# Patient Record
Sex: FEMALE | Race: WHITE | NOT HISPANIC OR LATINO | Employment: OTHER | URBAN - METROPOLITAN AREA
[De-identification: names, ages, dates, MRNs, and addresses within clinical notes are randomized per-mention and may not be internally consistent; named-entity substitution may affect disease eponyms.]

---

## 2017-02-16 ENCOUNTER — OFFICE VISIT (OUTPATIENT)
Dept: MEDICAL GROUP | Age: 72
End: 2017-02-16
Payer: MEDICARE

## 2017-02-16 VITALS
BODY MASS INDEX: 29.02 KG/M2 | SYSTOLIC BLOOD PRESSURE: 142 MMHG | RESPIRATION RATE: 14 BRPM | HEIGHT: 64 IN | TEMPERATURE: 98.5 F | DIASTOLIC BLOOD PRESSURE: 76 MMHG | HEART RATE: 73 BPM | WEIGHT: 170 LBS | OXYGEN SATURATION: 97 %

## 2017-02-16 DIAGNOSIS — H61.22 IMPACTED CERUMEN OF LEFT EAR: ICD-10-CM

## 2017-02-16 PROCEDURE — 99999 PR NO CHARGE: CPT | Performed by: PHYSICIAN ASSISTANT

## 2017-02-16 PROCEDURE — 99999 PR REMOVE IMPACTED EAR WAX: CPT | Performed by: PHYSICIAN ASSISTANT

## 2017-02-16 ASSESSMENT — PAIN SCALES - GENERAL: PAINLEVEL: NO PAIN

## 2017-02-16 ASSESSMENT — PATIENT HEALTH QUESTIONNAIRE - PHQ9: CLINICAL INTERPRETATION OF PHQ2 SCORE: 0

## 2017-02-16 NOTE — MR AVS SNAPSHOT
"        Yoselin Jorge   2017 3:40 PM   Office Visit   MRN: [id number]    Department:  [address] [place] [address]   Dept Phone:  [phone]    Description:  Female : 1945   Provider:  Shane Brownlee PA-C           Reason for Visit     Ear Fullness wax      Allergies as of 2017     No Known Allergies      You were diagnosed with     Impacted cerumen of left ear   [473081]         Vital Signs     Blood Pressure Pulse Temperature Respirations Height Weight    142/76 mmHg 73 36.9 °C (98.5 °F) 14 1.613 m (5' 3.5\") 77.111 kg (170 lb)    Body Mass Index Oxygen Saturation Last Menstrual Period Smoking Status          29.64 kg/m2 97% 1995 Never Smoker         Basic Information     Date Of Birth Sex Race Ethnicity Preferred Language    1945 Female White Non- English      Your appointments     2017  1:00 PM   Non Provider 1 with [place] [id number]  [facility] [id number] [phone]           You will be receiving a confirmation call a few days before your appointment from our automated call confirmation system.            Oct 24, 2017 10:20 AM   Established Patient with Britt Benjamin M.D.   [facility] [place] [address]  [facility] [address] [phone]           You will be receiving a confirmation call a few days before your appointment from our automated call confirmation system.              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   3/11/2013 - Present    Family history of melanoma Z80.8   3/11/2013 - Present    Other lymphedema I89.0   3/11/2013 - Present    Preventative health care Z00.00   3/17/2013 - Present    Impaired fasting glucose R73.01   3/17/2013 - Present    SENSORINEURAL HEARING LOSS    3/17/2013 - Present   " Osteopenia M85.80   3/30/2013 - Present    Swallowing difficulty R13.10   1/13/2015 - Present    Hypothyroidism (acquired) E03.9   1/13/2015 - Present    S/P dilatation of esophageal stricture Z98.890, Z87.19   5/16/2015 - Present    S/P thyroidectomy E89.0   5/22/2015 - Present    Impacted cerumen of left ear H61.22   2/16/2017 - Present      Health Maintenance        Date Due Completion Dates    MAMMOGRAM 4/19/2017 4/19/2016, 4/28/2015, 3/31/2014, 3/24/2014, 3/24/2014, 12/3/2012 (Done)    Override on 12/3/2012: Done    BONE DENSITY 4/28/2017 4/28/2015, 3/27/2013    IMM DTaP/Tdap/Td Vaccine (2 - Td) 10/27/2019 10/27/2009    COLONOSCOPY 1/5/2026 1/5/2016            Current Immunizations     13-VALENT PCV PREVNAR 10/16/2014    Influenza TIV (IM) 10/20/2013    Influenza Vaccine Adult HD 10/25/2016 11:06 AM, 10/12/2015    Influenza Vaccine Quad Inj (Pf) 10/9/2014    Pneumococcal polysaccharide vaccine (PPSV-23) 10/13/2011    SHINGLES VACCINE 10/27/2009    Tdap Vaccine 10/27/2009      Below and/or attached are the medications your provider expects you to take. Review all of your home medications and newly ordered medications with your provider and/or pharmacist. Follow medication instructions as directed by your provider and/or pharmacist. Please keep your medication list with you and share with your provider. Update the information when medications are discontinued, doses are changed, or new medications (including over-the-counter products) are added; and carry medication information at all times in the event of emergency situations     Allergies:  No Known Allergies          Medications  Valid as of: February 16, 2017 -  4:18 PM    Generic Name Brand Name Tablet Size Instructions for use    Aspirin (Tablet Delayed Response) ECOTRIN 81 MG Take 81 mg by mouth every day. 1 tab 3x a week        Cholecalciferol (Tab) cholecalciferol 1000 UNIT Take 1,000 Units by mouth every day.        Famotidine-Ca Carb-Mag Hydrox   Take   by mouth.        Levothyroxine Sodium (Tab) SYNTHROID 112 MCG Take 1 Tab by mouth every day.        Multiple Vitamins-Minerals   Take  by mouth.        Naproxen Sodium (Tab) ANAPROX 220 MG         Probiotic Product   Take  by mouth.        Simvastatin (Tab) ZOCOR 20 MG TAKE 1 TABLET BY MOUTH ATBEDTIME        Zoledronic Acid (Solution) RECLAST 5 MG/100ML 5 mg by Intravenous route Once.        .                 Medicines prescribed today were sent to:     [facility], [place] - [address]    [address] [facility] [place] [address]    Phone: [phone] Fax: [phone]    Open 24 Hours?: No      Medication refill instructions:       If your prescription bottle indicates you have medication refills left, it is not necessary to call your provider’s office. Please contact your pharmacy and they will refill your medication.    If your prescription bottle indicates you do not have any refills left, you may request refills at any time through one of the following ways: The online [employer] system (except Urgent Care), by calling your provider’s office, or by asking your pharmacy to contact your provider’s office with a refill request. Medication refills are processed only during regular business hours and may not be available until the next business day. Your provider may request additional information or to have a follow-up visit with you prior to refilling your medication.   *Please Note: Medication refills are assigned a new Rx number when refilled electronically. Your pharmacy may indicate that no refills were authorized even though a new prescription for the same medication is available at the pharmacy. Please request the medicine by name with the pharmacy before contacting your provider for a refill.           [employer] Access Code: Activation code not generated  Current [employer] Status: Active

## 2017-02-17 NOTE — ASSESSMENT & PLAN NOTE
This is a 71-year-old female with a history of ear wax impaction on the left side. Was over a year ago she had a lavage. Today she comes in with complaints of decreased hearing. Denies any pain. Has not used any over-the-counter remedies. Did put some oil in there the other day. She wears hearing aids.

## 2017-02-17 NOTE — PROGRESS NOTES
Subjective:   Yoselin Jorge is a 71 y.o. female here today for ear wax impaction of left side with possible lavage needed.    Impacted cerumen of left ear  This is a 71-year-old female with a history of ear wax impaction on the left side. Was over a year ago she had a lavage. Today she comes in with complaints of decreased hearing. Denies any pain. Has not used any over-the-counter remedies. Did put some oil in there the other day. She wears hearing aids.         Current medicines (including changes today)  Current Outpatient Prescriptions   Medication Sig Dispense Refill   • simvastatin (ZOCOR) 20 MG Tab TAKE 1 TABLET BY MOUTH ATBEDTIME 90 Tab 0   • zoledronic Acid (RECLAST) 5 MG/100ML Solution IVPB premix 5 mg by Intravenous route Once.     • levothyroxine (SYNTHROID) 112 MCG Tab Take 1 Tab by mouth every day. 90 Tab 1   • Probiotic Product (PROBIOTIC DAILY PO) Take  by mouth.     • Famotidine-Ca Carb-Mag Hydrox (ACID REDUCER + ANTACID PO) Take  by mouth.     • aspirin EC (ECOTRIN) 81 MG TBEC Take 81 mg by mouth every day. 1 tab 3x a week 30 Tab    • naproxen (ALEVE) 220 MG tablet  60 Tab    • vitamin D (CHOLECALCIFEROL) 1000 UNIT TABS Take 1,000 Units by mouth every day. 30 Tab 11   • Multiple Vitamins-Minerals (WOMENS BONE HEALTH PO) Take  by mouth.       No current facility-administered medications for this visit.     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    ROS   No chest pain, no shortness of breath, no abdominal pain and all other systems were reviewed and are  "negative.       Objective:     Blood pressure 142/76, pulse 73, temperature 36.9 °C (98.5 °F), resp. rate 14, height 1.613 m (5' 3.5\"), weight 77.111 kg (170 lb), last menstrual period 08/01/1995, SpO2 97 %. Body mass index is 29.64 kg/(m^2). *    Physical Exam:  Constitutional: Alert, no distress.  Skin: Warm, dry, good turgor, no rashes in visible areas.  Eye: Equal, round and reactive, conjunctiva clear, lids normal.  ENMT: Lips without lesions, good dentition, oropharynx clear, right TM is clear, left TM with cerumen impaction.  Neck: Trachea midline, no masses.   Lymph: No cervical or supraclavicular lymphadenopathy  Respiratory: Unlabored respiratory effort, lungs appear clear.  Cardiovascular: Regular rate and rhythm.   Psych: Alert and oriented x3, normal affect and mood.    Multiple attempts using warm water with hydrogen peroxide mix with the health and ears as well as a syringe were unsuccessful. Patient tolerated procedure well. Afterwards there was still a hard rock of wax in the ear.        Assessment and Plan:   The following treatment plan was discussed    1. Impacted cerumen of left ear  Acute condition. Unfortunately no Debrox to clinic. Unsuccessful attempt to remove cerumen impaction. Advised her to purchase over-the-counter Debrox or a substitute use as directed and follow-up on Tuesday of next week. She will make an appointment with Ada.  - PA REMOVE IMPACTED EAR WAX      Followup: Return in about 3 days (around 2/19/2017), or if symptoms worsen or fail to improve.    Please note that this dictation was created using voice recognition software. I have made every reasonable attempt to correct obvious errors, but I expect that there are errors of grammar and possibly content that I did not discover before finalizing the note.             "

## 2017-02-21 ENCOUNTER — NON-PROVIDER VISIT (OUTPATIENT)
Dept: MEDICAL GROUP | Facility: MEDICAL CENTER | Age: 72
End: 2017-02-21
Payer: MEDICARE

## 2017-02-21 DIAGNOSIS — H61.22 IMPACTED CERUMEN OF LEFT EAR: ICD-10-CM

## 2017-02-21 PROCEDURE — 69210 REMOVE IMPACTED EAR WAX UNI: CPT | Performed by: PHYSICIAN ASSISTANT

## 2017-02-21 NOTE — PROGRESS NOTES
Yoselin Pelaez Luisito is a 71 y.o. female here for a non-provider visit for ear lavage for left ear.    If abnormal was an in office provider notified today (if so, indicate provider)? No  Routed to PCP? Yes

## 2017-02-21 NOTE — MR AVS SNAPSHOT
EvelynMeka Pelaez Luisito   2017 1:00 PM   Non-Provider Visit   MRN: [id number]    Department:  [facility]   Dept Phone:  [phone]    Description:  Female : 1945   Provider:  [facility] MA           Reason for Visit     Cerumen Impaction left ear lavage      Allergies as of 2017     No Known Allergies      You were diagnosed with     Impacted cerumen of left ear   [[id number]]         Vital Signs     Last Menstrual Period Smoking Status                1995 Never Smoker           Basic Information     Date Of Birth Sex Race Ethnicity Preferred Language    1945 Female White Non- English      Your appointments     May 30, 2017  1:15 PM   Non Provider 1 with [facility] [address] [facility] [address] [facility] ([facility])    [address]  [facility] [address] [phone]           You will be receiving a confirmation call a few days before your appointment from our automated call confirmation system.            Oct 24, 2017 10:20 AM   Established Patient with Britt Benjamin M.D.   [facility]ology ([facility])    [address]  [facility] [address] [phone]           You will be receiving a confirmation call a few days before your appointment from our automated call confirmation system.              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   3/11/2013 - Present    Family history of melanoma Z80.8   3/11/2013 - Present    Other lymphedema I89.0   3/11/2013 - Present    Preventative health care Z00.00   3/17/2013 - Present    Impaired fasting glucose R73.01   3/17/2013 - Present    SENSORINEURAL HEARING LOSS    3/17/2013 - Present    Osteopenia M85.80   3/30/2013 - Present    Swallowing difficulty R13.10   2015 - Present    Hypothyroidism (acquired) E03.9   2015 - Present    S/P dilatation of  esophageal stricture Z98.890, Z87.19   5/16/2015 - Present    S/P thyroidectomy E89.0   5/22/2015 - Present    Impacted cerumen of left ear H61.22   2/16/2017 - Present      Health Maintenance        Date Due Completion Dates    MAMMOGRAM 4/19/2017 4/19/2016, 4/28/2015, 3/31/2014, 3/24/2014, 3/24/2014, 12/3/2012 (Done)    Override on 12/3/2012: Done    BONE DENSITY 4/28/2017 4/28/2015, 3/27/2013    IMM DTaP/Tdap/Td Vaccine (2 - Td) 10/27/2019 10/27/2009    COLONOSCOPY 1/5/2026 1/5/2016            Current Immunizations     13-VALENT PCV PREVNAR 10/16/2014    Influenza TIV (IM) 10/20/2013    Influenza Vaccine Adult HD 10/25/2016 11:06 AM, 10/12/2015    Influenza Vaccine Quad Inj (Pf) 10/9/2014    Pneumococcal polysaccharide vaccine (PPSV-23) 10/13/2011    SHINGLES VACCINE 10/27/2009    Tdap Vaccine 10/27/2009      Below and/or attached are the medications your provider expects you to take. Review all of your home medications and newly ordered medications with your provider and/or pharmacist. Follow medication instructions as directed by your provider and/or pharmacist. Please keep your medication list with you and share with your provider. Update the information when medications are discontinued, doses are changed, or new medications (including over-the-counter products) are added; and carry medication information at all times in the event of emergency situations     Allergies:  No Known Allergies          Medications  Valid as of: February 21, 2017 -  1:31 PM    Generic Name Brand Name Tablet Size Instructions for use    Aspirin (Tablet Delayed Response) ECOTRIN 81 MG Take 81 mg by mouth every day. 1 tab 3x a week        Cholecalciferol (Tab) cholecalciferol 1000 UNIT Take 1,000 Units by mouth every day.        Famotidine-Ca Carb-Mag Hydrox   Take  by mouth.        Levothyroxine Sodium (Tab) SYNTHROID 112 MCG Take 1 Tab by mouth every day.        Multiple Vitamins-Minerals   Take  by mouth.        Naproxen Sodium  (Tab) ANAPROX 220 MG         Probiotic Product   Take  by mouth.        Simvastatin (Tab) ZOCOR 20 MG TAKE 1 TABLET BY MOUTH ATBEDTIME        Zoledronic Acid (Solution) RECLAST 5 MG/100ML 5 mg by Intravenous route Once.        .                 Medicines prescribed today were sent to:     [facility] [address]  [place], [place] - [address]    [address] [facility] [place] [address]    Phone: [phone] Fax: [phone]    Open 24 Hours?: No      Medication refill instructions:       If your prescription bottle indicates you have medication refills left, it is not necessary to call your provider’s office. Please contact your pharmacy and they will refill your medication.    If your prescription bottle indicates you do not have any refills left, you may request refills at any time through one of the following ways: The online [employer] system (except Urgent Care), by calling your provider’s office, or by asking your pharmacy to contact your provider’s office with a refill request. Medication refills are processed only during regular business hours and may not be available until the next business day. Your provider may request additional information or to have a follow-up visit with you prior to refilling your medication.   *Please Note: Medication refills are assigned a new Rx number when refilled electronically. Your pharmacy may indicate that no refills were authorized even though a new prescription for the same medication is available at the pharmacy. Please request the medicine by name with the pharmacy before contacting your provider for a refill.           [employer] Access Code: Activation code not generated  Current [employer] Status: Active

## 2017-03-21 RX ORDER — LEVOTHYROXINE SODIUM 112 UG/1
TABLET ORAL
Qty: 90 TAB | Refills: 1 | Status: SHIPPED | OUTPATIENT
Start: 2017-03-21 | End: 2017-09-27 | Stop reason: SDUPTHER

## 2017-03-21 RX ORDER — SIMVASTATIN 20 MG
TABLET ORAL
Qty: 30 TAB | Refills: 0 | Status: SHIPPED | OUTPATIENT
Start: 2017-03-21 | End: 2017-05-23 | Stop reason: SDUPTHER

## 2017-03-21 NOTE — TELEPHONE ENCOUNTER
Was the patient seen in the last year in this department? Yes 4/25/2016    Does patient have an active prescription for medications requested? Yes     Received Request Via: Pharmacy

## 2017-04-17 ENCOUNTER — OFFICE VISIT (OUTPATIENT)
Dept: URGENT CARE | Facility: CLINIC | Age: 72
End: 2017-04-17
Payer: MEDICARE

## 2017-04-17 VITALS
WEIGHT: 164 LBS | DIASTOLIC BLOOD PRESSURE: 68 MMHG | RESPIRATION RATE: 18 BRPM | TEMPERATURE: 98.2 F | OXYGEN SATURATION: 97 % | HEART RATE: 66 BPM | HEIGHT: 63 IN | BODY MASS INDEX: 29.06 KG/M2 | SYSTOLIC BLOOD PRESSURE: 124 MMHG

## 2017-04-17 DIAGNOSIS — M54.2 NECK PAIN: ICD-10-CM

## 2017-04-17 DIAGNOSIS — Z85.819 HISTORY OF OROPHARYNGEAL CANCER: ICD-10-CM

## 2017-04-17 LAB
INT CON NEG: NEGATIVE
INT CON POS: POSITIVE
S PYO AG THROAT QL: NORMAL

## 2017-04-17 PROCEDURE — 4040F PNEUMOC VAC/ADMIN/RCVD: CPT | Performed by: NURSE PRACTITIONER

## 2017-04-17 PROCEDURE — 1101F PT FALLS ASSESS-DOCD LE1/YR: CPT | Performed by: NURSE PRACTITIONER

## 2017-04-17 PROCEDURE — G8432 DEP SCR NOT DOC, RNG: HCPCS | Performed by: NURSE PRACTITIONER

## 2017-04-17 PROCEDURE — 87880 STREP A ASSAY W/OPTIC: CPT | Performed by: NURSE PRACTITIONER

## 2017-04-17 PROCEDURE — G8419 CALC BMI OUT NRM PARAM NOF/U: HCPCS | Performed by: NURSE PRACTITIONER

## 2017-04-17 PROCEDURE — 99214 OFFICE O/P EST MOD 30 MIN: CPT | Performed by: NURSE PRACTITIONER

## 2017-04-17 PROCEDURE — 1036F TOBACCO NON-USER: CPT | Performed by: NURSE PRACTITIONER

## 2017-04-17 PROCEDURE — 3014F SCREEN MAMMO DOC REV: CPT | Performed by: NURSE PRACTITIONER

## 2017-04-17 NOTE — MR AVS SNAPSHOT
"        Yoselin Pelaez Jorge   2017 5:00 PM   Office Visit   MRN: [id number]    Department:  [facility]   Dept Phone:  [phone]    Description:  Female : 1945   Provider:  Cathey J Hamman, A.P.N.           Reason for Visit     Pharyngitis ear ache, swollen gland,       Allergies as of 2017     No Known Allergies      You were diagnosed with     Neck pain   [280847]       History of oropharyngeal cancer   [859423]         Vital Signs     Blood Pressure Pulse Temperature Respirations Height Weight    124/68 mmHg 66 36.8 °C (98.2 °F) 18 1.6 m (5' 3\") 74.39 kg (164 lb)    Body Mass Index Oxygen Saturation Last Menstrual Period Smoking Status          29.06 kg/m2 97% 1995 Never Smoker         Basic Information     Date Of Birth Sex Race Ethnicity Preferred Language    1945 Female White Non- English      Your appointments     2017  9:30 AM   Adult Draw/Collection with LAB [facility]   LAB - [facility] (--)    [address]  [place] [address]   [phone]            May 11, 2017 10:30 AM   MA SCRN10 with S BENIGNO MG 1   [facility]MOGRAP[facility] ([place])    [address]  [place] [address] [phone]           No deodorant, powder, perfume or lotion under the arm or breast area.            May 11, 2017  1:00 PM   Access New To You with Joselin Caro M.D.   [facility] (--)    [address]  [place] [address] [phone]            May 30, 2017  1:15 PM   Non Provider 1 with [facility] [address]   [facility] ([facility])    [address]  [place] [id number] [phone]           You will be receiving a confirmation call a few days before your appointment from our automated call confirmation system.            Oct 24, 2017 10:20 AM   Established Patient with Britt Benjamin M.D.   [facility] End[facility] ([facility]    [address]  [facility] [address] [phone]           You will be receiving a confirmation call a few days before your appointment from our automated call confirmation system.              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   3/11/2013 - Present    Family history of melanoma Z80.8   3/11/2013 - Present    Other lymphedema I89.0   3/11/2013 - Present    Preventative health care Z00.00   3/17/2013 - Present    Impaired fasting glucose R73.01   3/17/2013 - Present    SENSORINEURAL HEARING LOSS    3/17/2013 - Present    Osteopenia M85.80   3/30/2013 - Present    Swallowing difficulty R13.10   1/13/2015 - Present    Hypothyroidism (acquired) E03.9   1/13/2015 - Present    S/P dilatation of esophageal stricture Z98.890, Z87.19   5/16/2015 - Present    S/P thyroidectomy E89.0   5/22/2015 - Present    Impacted cerumen of left ear H61.22   2/16/2017 - Present      Health Maintenance        Date Due Completion Dates    MAMMOGRAM 4/19/2017 4/19/2016, 4/28/2015, 3/31/2014, 3/24/2014, 3/24/2014, 12/3/2012 (Done)    Override on 12/3/2012: Done    BONE DENSITY 4/28/2017 4/28/2015, 3/27/2013    IMM DTaP/Tdap/Td Vaccine (2 - Td) 10/27/2019 10/27/2009    COLONOSCOPY 1/5/2026 1/5/2016            Results     POCT Rapid Strep A      Component    Rapid Strep Screen    NEG    Internal Control Positive    Positive    Internal Control Negative    Negative                        Current Immunizations     13-VALENT PCV PREVNAR 10/16/2014    Influenza TIV (IM) 10/20/2013    Influenza Vaccine Adult HD 10/25/2016 11:06 AM, 10/12/2015    Influenza Vaccine Quad Inj (Pf) 10/9/2014    Pneumococcal polysaccharide vaccine (PPSV-23) 10/13/2011    SHINGLES VACCINE 10/27/2009    Tdap Vaccine 10/27/2009      Below and/or attached are the medications your provider expects you to take. Review all of your home medications  and newly ordered medications with your provider and/or pharmacist. Follow medication instructions as directed by your provider and/or pharmacist. Please keep your medication list with you and share with your provider. Update the information when medications are discontinued, doses are changed, or new medications (including over-the-counter products) are added; and carry medication information at all times in the event of emergency situations     Allergies:  No Known Allergies          Medications  Valid as of: April 17, 2017 -  6:07 PM    Generic Name Brand Name Tablet Size Instructions for use    Aspirin (Tablet Delayed Response) ECOTRIN 81 MG Take 81 mg by mouth every day. 1 tab 3x a week        Cholecalciferol (Tab) cholecalciferol 1000 UNIT Take 1,000 Units by mouth every day.        Famotidine-Ca Carb-Mag Hydrox   Take  by mouth.        Levothyroxine Sodium (Tab) SYNTHROID 112 MCG TAKE 1 TAB BY MOUTH EVERYDAY.        Multiple Vitamins-Minerals   Take  by mouth.        Naproxen Sodium (Tab) ANAPROX 220 MG         Probiotic Product   Take  by mouth.        Simvastatin (Tab) ZOCOR 20 MG TAKE 1 TABLET BY MOUTH ATBEDTIME        Zoledronic Acid (Solution) RECLAST 5 MG/100ML 5 mg by Intravenous route Once.        .                 Medicines prescribed today were sent to:     [facility] [address] [place] [address] [facility]    [address] [address]    Phone: [phone] Fax: [phone]    Open 24 Hours?: No      Medication refill instructions:       If your prescription bottle indicates you have medication refills left, it is not necessary to call your provider’s office. Please contact your pharmacy and they will refill your medication.    If your prescription bottle indicates you do not have any refills left, you may request refills at any time through one of the following ways: The online [employer] system (except Urgent Care), by calling your  provider’s office, or by asking your pharmacy to contact your provider’s office with a refill request. Medication refills are processed only during regular business hours and may not be available until the next business day. Your provider may request additional information or to have a follow-up visit with you prior to refilling your medication.   *Please Note: Medication refills are assigned a new Rx number when refilled electronically. Your pharmacy may indicate that no refills were authorized even though a new prescription for the same medication is available at the pharmacy. Please request the medicine by name with the pharmacy before contacting your provider for a refill.        Your To Do List     Future Labs/Procedures Complete By Expires    US-SOFT TISSUES OF HEAD - NECK  As directed 4/17/2018         [employer] Access Code: Activation code not generated  Current [employer] Status: Active

## 2017-04-19 ENCOUNTER — HOSPITAL ENCOUNTER (OUTPATIENT)
Dept: LAB | Facility: MEDICAL CENTER | Age: 72
End: 2017-04-19
Attending: INTERNAL MEDICINE
Payer: MEDICARE

## 2017-04-19 DIAGNOSIS — R73.01 IMPAIRED FASTING GLUCOSE: ICD-10-CM

## 2017-04-19 DIAGNOSIS — E89.0 POSTSURGICAL HYPOTHYROIDISM: ICD-10-CM

## 2017-04-19 DIAGNOSIS — E78.2 MIXED HYPERLIPIDEMIA: ICD-10-CM

## 2017-04-19 LAB
CREAT UR-MCNC: 137.4 MG/DL
EST. AVERAGE GLUCOSE BLD GHB EST-MCNC: 117 MG/DL
HBA1C MFR BLD: 5.7 % (ref 0–5.6)
MICROALBUMIN UR-MCNC: 1.4 MG/DL
MICROALBUMIN/CREAT UR: 10 MG/G (ref 0–30)
T4 FREE SERPL-MCNC: 1.07 NG/DL (ref 0.53–1.43)
TSH SERPL DL<=0.005 MIU/L-ACNC: 1.99 UIU/ML (ref 0.3–3.7)

## 2017-04-19 PROCEDURE — 80061 LIPID PANEL: CPT

## 2017-04-19 PROCEDURE — 80048 BASIC METABOLIC PNL TOTAL CA: CPT

## 2017-04-19 PROCEDURE — 83036 HEMOGLOBIN GLYCOSYLATED A1C: CPT

## 2017-04-19 PROCEDURE — 36415 COLL VENOUS BLD VENIPUNCTURE: CPT

## 2017-04-19 PROCEDURE — 82570 ASSAY OF URINE CREATININE: CPT

## 2017-04-19 PROCEDURE — 84443 ASSAY THYROID STIM HORMONE: CPT

## 2017-04-19 PROCEDURE — 82043 UR ALBUMIN QUANTITATIVE: CPT

## 2017-04-19 PROCEDURE — 84439 ASSAY OF FREE THYROXINE: CPT

## 2017-04-20 LAB
ANION GAP SERPL CALC-SCNC: 11 MMOL/L (ref 0–11.9)
BUN SERPL-MCNC: 20 MG/DL (ref 8–22)
CALCIUM SERPL-MCNC: 10.1 MG/DL (ref 8.5–10.5)
CHLORIDE SERPL-SCNC: 104 MMOL/L (ref 96–112)
CHOLEST SERPL-MCNC: 158 MG/DL (ref 100–199)
CO2 SERPL-SCNC: 25 MMOL/L (ref 20–33)
CREAT SERPL-MCNC: 0.7 MG/DL (ref 0.5–1.4)
GFR SERPL CREATININE-BSD FRML MDRD: >60 ML/MIN/1.73 M 2
GLUCOSE SERPL-MCNC: 95 MG/DL (ref 65–99)
HDLC SERPL-MCNC: 60 MG/DL
LDLC SERPL CALC-MCNC: 83 MG/DL
POTASSIUM SERPL-SCNC: 4.3 MMOL/L (ref 3.6–5.5)
SODIUM SERPL-SCNC: 140 MMOL/L (ref 135–145)
TRIGL SERPL-MCNC: 76 MG/DL (ref 0–149)

## 2017-04-28 ASSESSMENT — ENCOUNTER SYMPTOMS
EYES NEGATIVE: 1
MUSCULOSKELETAL NEGATIVE: 1
RESPIRATORY NEGATIVE: 1
SORE THROAT: 1
FEVER: 0
GASTROINTESTINAL NEGATIVE: 1
CHILLS: 0
CARDIOVASCULAR NEGATIVE: 1
SWOLLEN GLANDS: 1

## 2017-04-28 NOTE — PROGRESS NOTES
Subjective:      Yoselin Jorge is a 71 y.o. 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 IOL   • S/P dilatation of esophageal stricture 5/16/2015     EGD 2015.   • S/P thyroidectomy 5/22/2015     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    Moved from [place] to [place] 2011.      Family History   Problem Relation Age of Onset   • Cancer Brother      melanoma, liver cancer   • Cancer Brother      prostate cancer   • Diabetes Father    • Lung Disease Mother 68     Emphazema   • Cancer Brother 55     liver cancer   • Cancer Brother 55     Prostate cancer   • Heart Disease Maternal Grandmother 63       Allergies: Review of patient's allergies indicates no known allergies.    This patient is a 71-year-old female who presents today with complaint of ear fullness and slightly sore throat. Symptoms started over the last few days. Moreover, patient is concerned because she has had a history of oropharyngeal cancer and she noted some swelling in the lymph nodes and is concerned about this. She has not had any fever, aches, or  "chills.          Pharyngitis   This is a new problem. The current episode started in the past 7 days. The problem has been unchanged. There has been no fever. Associated symptoms include swollen glands. She has had no exposure to strep or mono. She has tried nothing for the symptoms. The treatment provided no relief.       Review of Systems   Constitutional: Positive for malaise/fatigue. Negative for fever and chills.   HENT: Positive for sore throat.    Eyes: Negative.    Respiratory: Negative.    Cardiovascular: Negative.    Gastrointestinal: Negative.    Genitourinary: Negative.    Musculoskeletal: Negative.    Skin: Negative.        All other systems reviewed and are within normal limits     Objective:     /68 mmHg  Pulse 66  Temp(Src) 36.8 °C (98.2 °F)  Resp 18  Ht 1.6 m (5' 3\")  Wt 74.39 kg (164 lb)  BMI 29.06 kg/m2  SpO2 97%  LMP 08/01/1995     Physical Exam   Constitutional: She is oriented to person, place, and time. She appears well-developed and well-nourished. No distress.   HENT:   Right Ear: External ear normal.   Left Ear: External ear normal.   Nose: Nose normal.   Oropharynx slightly red    Eyes: EOM are normal. Pupils are equal, round, and reactive to light.   Neck: Normal range of motion. Neck supple.   Mild right sided adenopathy   Cardiovascular: Normal rate and regular rhythm.    Pulmonary/Chest: Effort normal and breath sounds normal.   Musculoskeletal: Normal range of motion.   Lymphadenopathy:     She has cervical adenopathy.   Neurological: She is alert and oriented to person, place, and time.   Skin: Skin is warm and dry. She is not diaphoretic.   Psychiatric: She has a normal mood and affect.   Vitals reviewed.    Rapid strep: negative           Assessment/Plan:     1. Neck pain    - POCT Rapid Strep A  - US-SOFT TISSUES OF HEAD - NECK; Future    2. History of oropharyngeal cancer    - POCT Rapid Strep A  - US-SOFT TISSUES OF HEAD - NECK; Future; phylicia [facility] results  -Follow " up if symptoms persist or worsen

## 2017-05-04 ENCOUNTER — TELEPHONE (OUTPATIENT)
Dept: MEDICAL GROUP | Facility: LAB | Age: 72
End: 2017-05-04

## 2017-05-10 ENCOUNTER — HOSPITAL ENCOUNTER (OUTPATIENT)
Dept: RADIOLOGY | Facility: MEDICAL CENTER | Age: 72
End: 2017-05-10
Attending: NURSE PRACTITIONER
Payer: MEDICARE

## 2017-05-10 DIAGNOSIS — Z85.819 HISTORY OF OROPHARYNGEAL CANCER: ICD-10-CM

## 2017-05-10 DIAGNOSIS — M54.2 NECK PAIN: ICD-10-CM

## 2017-05-10 PROCEDURE — 76536 US EXAM OF HEAD AND NECK: CPT

## 2017-05-11 ENCOUNTER — HOSPITAL ENCOUNTER (OUTPATIENT)
Dept: RADIOLOGY | Facility: MEDICAL CENTER | Age: 72
End: 2017-05-11
Attending: FAMILY MEDICINE
Payer: MEDICARE

## 2017-05-11 ENCOUNTER — OFFICE VISIT (OUTPATIENT)
Dept: MEDICAL GROUP | Facility: LAB | Age: 72
End: 2017-05-11
Payer: MEDICARE

## 2017-05-11 VITALS
WEIGHT: 174 LBS | DIASTOLIC BLOOD PRESSURE: 60 MMHG | TEMPERATURE: 97.8 F | HEART RATE: 60 BPM | BODY MASS INDEX: 29.71 KG/M2 | SYSTOLIC BLOOD PRESSURE: 122 MMHG | HEIGHT: 64 IN | RESPIRATION RATE: 12 BRPM | OXYGEN SATURATION: 96 %

## 2017-05-11 DIAGNOSIS — E66.9 OBESITY (BMI 30-39.9): ICD-10-CM

## 2017-05-11 DIAGNOSIS — Z85.819 HISTORY OF NASOPHARYNGEAL CANCER: ICD-10-CM

## 2017-05-11 DIAGNOSIS — M54.2 NECK PAIN ON RIGHT SIDE: ICD-10-CM

## 2017-05-11 DIAGNOSIS — R59.0 LYMPHADENOPATHY OF RIGHT CERVICAL REGION: ICD-10-CM

## 2017-05-11 DIAGNOSIS — R73.09 ELEVATED HEMOGLOBIN A1C: ICD-10-CM

## 2017-05-11 DIAGNOSIS — E78.5 HYPERLIPIDEMIA, UNSPECIFIED HYPERLIPIDEMIA TYPE: ICD-10-CM

## 2017-05-11 DIAGNOSIS — Z12.31 ENCOUNTER FOR SCREENING MAMMOGRAM FOR BREAST CANCER: ICD-10-CM

## 2017-05-11 DIAGNOSIS — Z13.9 SCREENING: ICD-10-CM

## 2017-05-11 PROCEDURE — 1036F TOBACCO NON-USER: CPT | Performed by: FAMILY MEDICINE

## 2017-05-11 PROCEDURE — 3014F SCREEN MAMMO DOC REV: CPT | Performed by: FAMILY MEDICINE

## 2017-05-11 PROCEDURE — 4040F PNEUMOC VAC/ADMIN/RCVD: CPT | Performed by: FAMILY MEDICINE

## 2017-05-11 PROCEDURE — G8432 DEP SCR NOT DOC, RNG: HCPCS | Performed by: FAMILY MEDICINE

## 2017-05-11 PROCEDURE — 77063 BREAST TOMOSYNTHESIS BI: CPT

## 2017-05-11 PROCEDURE — G8419 CALC BMI OUT NRM PARAM NOF/U: HCPCS | Performed by: FAMILY MEDICINE

## 2017-05-11 PROCEDURE — 1101F PT FALLS ASSESS-DOCD LE1/YR: CPT | Performed by: FAMILY MEDICINE

## 2017-05-11 PROCEDURE — 99214 OFFICE O/P EST MOD 30 MIN: CPT | Performed by: FAMILY MEDICINE

## 2017-05-11 NOTE — PROGRESS NOTES
"Chief Complaint   Patient presents with   • Establish Care       HISTORY OF PRESENT ILLNESS: Patient is a 71 y.o. female established patient who presents today to establish     History of nasopharyngeal cancer.   States that she was diagnosed in 2007 with stage 4 head and neck cancer. She had a sore throat initially and was given antibiotics. She followed up with her primary care and she was sent to see the specialist and diagnosed. Patient did not have surgery. She underwent chemotherapy and radiation. She had a PEG tube for 9 months. She states she lost a lot of weight during this time. Patient states that recently about 3 weeks ago she noticed that the right side of her neck was sore. She went to the urgent care yesterday and had an ultrasound done. This shows 2 nonspecific hypoechoic lymph nodes of uncertain significance. US results as below. Patient is not sure what to do next. She does not have an ENT here. She was dx in [place]     Right-sided lower back pain  This is a new problem to discuss today. States that sometimes when she bends over at her waist she will have back pain on the right primarily. If she bends or turns the wrong way it will get her attention. No radiation of pain down the leg. Feels like a \"tight glitch\".  Will resolve when she stands back up straight. Has not had xray of the lower back. Aleve helps. Does not occur every time she bends over. Is not a bony pain     a1c 5.7. She is working on this and working on her weight. She states her weight is up a little bit and she recently rejoined [employer].     New patient health questionnaire reviewed and scanned into media    Reviewed care teams     Patient Active Problem List    Diagnosis Date Noted   • Impacted cerumen of left ear 02/16/2017   • S/P thyroidectomy  Taken out 2 years ago. 2015 05/22/2015   • S/P dilatation of esophageal stricture  With Dr Pizarro. She does still have difficulty swallowing  05/16/2015   • Swallowing difficulty " 01/13/2015   • Hypothyroidism (acquired)  This is chronic  01/13/2015   • Osteopenia  This is chronic  03/30/2013   • Preventative health care 03/17/2013   • Impaired fasting glucose  This is chronic  03/17/2013   • SENSORINEURAL HEARING LOSS 03/17/2013   • Hyperlipidemia  This is chronic . Had labs done not too long ago  03/11/2013   • History of nasopharyngeal cancer 03/11/2013   • Family history of melanoma 03/11/2013   • Other lymphedema 03/11/2013        Allergies:Review of patient's allergies indicates no known allergies.    Current Outpatient Prescriptions   Medication Sig Dispense Refill   • levothyroxine (SYNTHROID) 112 MCG Tab TAKE 1 TAB BY MOUTH EVERYDAY. 90 Tab 1   • simvastatin (ZOCOR) 20 MG Tab TAKE 1 TABLET BY MOUTH ATBEDTIME 30 Tab 0   • Probiotic Product (PROBIOTIC DAILY PO) Take  by mouth.     • Famotidine-Ca Carb-Mag Hydrox (ACID REDUCER + ANTACID PO) Take  by mouth.     • aspirin EC (ECOTRIN) 81 MG TBEC Take 81 mg by mouth every day. 1 tab 3x a week 30 Tab    • naproxen (ALEVE) 220 MG tablet  60 Tab    • vitamin D (CHOLECALCIFEROL) 1000 UNIT TABS Take 1,000 Units by mouth every day. 30 Tab 11   • Multiple Vitamins-Minerals (WOMENS BONE HEALTH PO) Take  by mouth.     • zoledronic Acid (RECLAST) 5 MG/100ML Solution IVPB premix 5 mg by Intravenous route Once.       No current facility-administered medications for this visit.       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    Moved from [place] to [place] 2011.        Past  "Surgical History   Procedure Laterality Date   • Tonsillectomy     • Tendon release foot  August 2013     mcmeeken    • Cataract phaco with iol       Ry   • Cholecystectomy  1988   • Bunionectomy  1988   • Tubal ligation  1982   • Biceps tenodesis  5/22/2014     Performed by Cain Gerardo M.D. at SUR[facility]ERY [facility]   • Shoulder arthroscopy w/ rotator cuff repair  5/22/2014     Performed by Cain Gerardo M.D. at [facility]   • Shoulder decompression arthroscopic  5/22/2014     Performed by Cain Gerardo M.D. at SUR[facility]ERY [facility]   • Clavicle distal excision  5/22/2014     Performed by Cain Gerardo M.D. at SUR[facility]ERY [facility]   • Other orthopedic surgery  2006     reconstruction  left foot   • Gyn surgery       tubal   • Thyroidectomy total  11/19/2014     Performed by Lukas De Santiago M.D. at SURGERY [facility] DAY [facility]   • Us-needle core bx-breast panel           Family History   Problem Relation Age of Onset   • Cancer Brother      melanoma, liver cancer   • Cancer Brother      prostate cancer   • Diabetes Father    • Lung Disease Mother 68     Emphazema   • Cancer Brother 55     liver cancer   • Cancer Brother 55     Prostate cancer   • Heart Disease Maternal Grandmother 63       Review of Systems   No fever, blurred vision, shortness of breath, chest pain, nausea/ vomiting, dysuria, falls, rash, seizures, environmental allergies, insomnia.  All other systems reviewed and are negative.      Exam:    Blood pressure 122/60, pulse 60, temperature 36.6 °C (97.8 °F), resp. rate 12, height 1.613 m (5' 3.5\"), weight 78.926 kg (174 lb), last menstrual period 08/01/1995, SpO2 96 %.      Physical Exam   Constitutional: Appears well-developed and well-nourished. Is active and cooperative.  Non-toxic appearance.   Head: Normocephalic and atraumatic.   Right Ear: External ear normal. Tympanic membrane normal  Left Ear: External ear normal. Tympanic membrane normal  Oropharynx without " erythema  Neck. No lymph nodes appreciated on exam  Nose: Mild mucosal edema present.   Eyes: Conjunctivae, EOM and lids are normal. No scleral icterus.   Cardiovascular: Normal rate, regular rhythm and normal heart sounds.    Pulmonary/Chest: Effort normal and breath sounds normal.   Back: Patient does not have any bony tenderness. She has no tenderness to palpation over the lower right back, upper buttock   Neurological: Alert. No tremor. No display of seizure activity. Coordination and gait normal.   Skin: Skin is warm and dry. Patient is not diaphoretic.   Psychiatric: patient has a normal mood and affect; speech is normal and behavior is normal.      5/10/2017 2:18 PM    HISTORY/REASON FOR EXAM:  Swelling  Neck swelling. History of oropharyngeal cancer    TECHNIQUE/EXAM DESCRIPTION:  Ultrasound of the soft tissues of the head and neck.    COMPARISON:  None    FINDINGS:  The thyroid gland is surgically absent. No thyroid tissue seen.    No fluid collection identified.    There is a 6 x 5 x 4 mm hypoechoic area in the midline neck. There is a 4 x 3 x 5 mm hypoechoic nodule in the right submandibular region.         Impression      2 nonspecific hypoechoic masses measuring up to 6 mm could represent small lymph nodes. They are somewhat nonspecific in ultrasound appearance.             Results for DIANA TAMAYOEN CATARINA (MRN [id number]) as of 5/11/2017 13:20   Ref. Range 4/19/2017 09:35 4/19/2017 09:36   Sodium Latest Ref Range: 135-145 mmol/L 140    Potassium Latest Ref Range: 3.6-5.5 mmol/L 4.3    Chloride Latest Ref Range:  mmol/L 104    Co2 Latest Ref Range: 20-33 mmol/L 25    Anion Gap Latest Ref Range: 0.0-11.9  11.0    Glucose Latest Ref Range: 65-99 mg/dL 95    Bun Latest Ref Range: 8-22 mg/dL 20    Creatinine Latest Ref Range: 0.50-1.40 mg/dL 0.70    GFR If  Latest Ref Range: >60 mL/min/1.73 m 2 >60    GFR If Non  Latest Ref Range: >60 mL/min/1.73 m 2 >60    Calcium Latest  Ref Range: 8.5-10.5 mg/dL 10.1    Glycohemoglobin Latest Ref Range: 0.0-5.6 % 5.7 (H)    Estim. Avg Glu Latest Units: mg/dL 117    Cholesterol,Tot Latest Ref Range: 100-199 mg/dL 158    Triglycerides Latest Ref Range: 0-149 mg/dL 76    HDL Latest Ref Range: >=40 mg/dL 60    LDL Latest Ref Range: <100 mg/dL 83    Micro Alb Creat Ratio Latest Ref Range: 0-30 mg/g  10   Creatinine, Urine Latest Units: mg/dL  137.40   Microalbumin, Urine Random Latest Units: mg/dL  1.4   TSH Latest Ref Range: 0.300-3.700 uIU/mL 1.990    Free T-4 Latest Ref Range: 0.53-1.43 ng/dL 1.07        Assessment/Plan:     1. Lymphadenopathy of right cervical region  Discussed with patient. I would like her to have a CT of the neck with contrast. Also would like her to see ENT for a reevaluation. She is in agreement   - REFERRAL TO ENT  - CT-SOFT TISSUE NECK WITH; Future    2. Neck pain on right side  - REFERRAL TO ENT  - CT-SOFT TISSUE NECK WITH; Future    3. History of nasopharyngeal cancer  - REFERRAL TO ENT  - CT-SOFT TISSUE NECK WITH; Future    4. Hyperlipidemia, unspecified hyperlipidemia type  Controlled. No change in medication    5. Obesity (BMI 30-39.9)  Patient is working on this.  - Patient identified as having weight management issue.  Appropriate orders and counseling given.    Patient is working on weight loss. Mammogram ordered. Patient is to let me know how her back is. May need to get an x-ray of her pelvis and lumbar region if needed. F/u after ENT consult and CT neck     Please note that this dictation was created using voice recognition software. I have made every reasonable attempt to correct obvious errors, but I expect that there are errors of grammar and possibly content that I did not discover before finalizing the note.

## 2017-05-11 NOTE — MR AVS SNAPSHOT
"        Yoselin Pelaez Jorge   2017 1:00 PM   Office Visit   MRN: [id number]    Department:  [facility]   Dept Phone:  [phone]    Description:  Female : 1945   Provider:  Joselin Caro M.D.           Reason for Visit     Establish Care           Allergies as of 2017     No Known Allergies      You were diagnosed with     Lymphadenopathy of right cervical region   [763266]       Neck pain on right side   [912471]       History of nasopharyngeal cancer   [558852]       Hyperlipidemia, unspecified hyperlipidemia type   [9391326]       Obesity (BMI 30-39.9)   [061550]         Vital Signs     Blood Pressure Pulse Temperature Respirations Height Weight    122/60 mmHg 60 36.6 °C (97.8 °F) 12 1.613 m (5' 3.5\") 78.926 kg (174 lb)    Body Mass Index Oxygen Saturation Last Menstrual Period Smoking Status          30.34 kg/m2 96% 1995 Never Smoker         Basic Information     Date Of Birth Sex Race Ethnicity Preferred Language    1945 Female White Non- English      Your appointments     May 30, 2017  1:15 PM   Non Provider 1 with [facility] 25 [facility]    [address] [facility] [address] [phone]           You will be receiving a confirmation call a few days before your appointment from our automated call confirmation system.            2017 11:20 AM   Established Patient with Joselin Caro M.D.   [facility] (--)    [address]   [place] [address] [phone]           You will be receiving a confirmation call a few days before your appointment from our automated call confirmation system.            Oct 24, 2017 10:20 AM   Established Patient with Britt Benjamin M.D.   [facility]    [address]  [place] [address] [phone]           You will be receiving a confirmation call a few days before your " appointment from our automated call confirmation system.              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   3/11/2013 - Present    Family history of melanoma Z80.8   3/11/2013 - Present    Other lymphedema I89.0   3/11/2013 - Present    Preventative health care Z00.00   3/17/2013 - Present    Impaired fasting glucose R73.01   3/17/2013 - Present    SENSORINEURAL HEARING LOSS    3/17/2013 - Present    Osteopenia M85.80   3/30/2013 - Present    Swallowing difficulty R13.10   1/13/2015 - Present    Hypothyroidism (acquired) E03.9   1/13/2015 - Present    S/P dilatation of esophageal stricture Z98.890, Z87.19   5/16/2015 - Present    S/P thyroidectomy E89.0   5/22/2015 - Present    Impacted cerumen of left ear H61.22   2/16/2017 - Present    Obesity (BMI 30-39.9) E66.9   5/11/2017 - Present      Health Maintenance        Date Due Completion Dates    IMM ZOSTER VACCINE 6/20/2006 ---    IMM PNEUMOCOCCAL 65+ (ADULT) LOW/MEDIUM RISK SERIES (2 of 2 - PPSV23) 10/16/2015 10/16/2014    MAMMOGRAM 4/19/2017 4/19/2016, 4/28/2015, 3/31/2014, 12/3/2012 (Done)    Override on 12/3/2012: Done    BONE DENSITY 4/28/2017 4/28/2015, 3/27/2013    IMM DTaP/Tdap/Td Vaccine (2 - Td) 10/27/2019 10/27/2009    COLONOSCOPY 1/5/2026 1/5/2016            Current Immunizations     13-VALENT PCV PREVNAR 10/16/2014    Influenza TIV (IM) 10/20/2013    Influenza Vaccine Adult HD 10/25/2016 11:06 AM, 10/12/2015    Influenza Vaccine Quad Inj (Pf) 10/9/2014    Tdap Vaccine 10/27/2009      Below and/or attached are the medications your provider expects you to take. Review all of your home medications and newly ordered medications with your provider and/or pharmacist. Follow medication instructions as directed by your provider and/or pharmacist. Please keep your medication list with you and share with your provider. Update the information  when medications are discontinued, doses are changed, or new medications (including over-the-counter products) are added; and carry medication information at all times in the event of emergency situations     Allergies:  No Known Allergies          Medications  Valid as of: May 11, 2017 -  1:35 PM    Generic Name Brand Name Tablet Size Instructions for use    Aspirin (Tablet Delayed Response) ECOTRIN 81 MG Take 81 mg by mouth every day. 1 tab 3x a week        Cholecalciferol (Tab) cholecalciferol 1000 UNIT Take 1,000 Units by mouth every day.        Famotidine-Ca Carb-Mag Hydrox   Take  by mouth.        Levothyroxine Sodium (Tab) SYNTHROID 112 MCG TAKE 1 TAB BY MOUTH EVERYDAY.        Multiple Vitamins-Minerals   Take  by mouth.        Naproxen Sodium (Tab) ANAPROX 220 MG         Probiotic Product   Take  by mouth.        Simvastatin (Tab) ZOCOR 20 MG TAKE 1 TABLET BY MOUTH ATBEDTIME        Zoledronic Acid (Solution) RECLAST 5 MG/100ML 5 mg by Intravenous route Once.        .                 Medicines prescribed today were sent to:     [facility] [id number] [place] [address] [facility]    [address] [facility] [address]    Phone: [phone] Fax: [phone]    Open 24 Hours?: No    [id number] - [place], [place] - [address]    [address] [place] [address]    Phone: [phone] Fax: [phone]    Open 24 Hours?: No      Medication refill instructions:       If your prescription bottle indicates you have medication refills left, it is not necessary to call your provider’s office. Please contact your pharmacy and they will refill your medication.    If your prescription bottle indicates you do not have any refills left, you may request refills at any time through one of the following ways: The online [employer] system (except Urgent Care), by calling your provider’s office, or by asking your pharmacy to contact your provider’s office with a refill  request. Medication refills are processed only during regular business hours and may not be available until the next business day. Your provider may request additional information or to have a follow-up visit with you prior to refilling your medication.   *Please Note: Medication refills are assigned a new Rx number when refilled electronically. Your pharmacy may indicate that no refills were authorized even though a new prescription for the same medication is available at the pharmacy. Please request the medicine by name with the pharmacy before contacting your provider for a refill.        Your To Do List     Future Labs/Procedures Complete By Expires    CT-SOFT TISSUE NECK WITH  As directed 5/11/2018      Referral     A referral request has been sent to our patient care coordination department. Please allow 3-5 business days for us to process this request and contact you either by phone or mail. If you do not hear from us by the 5th business day, please call us at [phone].           vivio Access Code: Activation code not generated  Current vivio Status: Active

## 2017-05-22 ENCOUNTER — HOSPITAL ENCOUNTER (OUTPATIENT)
Dept: RADIOLOGY | Facility: MEDICAL CENTER | Age: 72
End: 2017-05-22
Attending: FAMILY MEDICINE
Payer: MEDICARE

## 2017-05-22 DIAGNOSIS — R59.0 LYMPHADENOPATHY OF RIGHT CERVICAL REGION: ICD-10-CM

## 2017-05-22 DIAGNOSIS — M54.2 NECK PAIN ON RIGHT SIDE: ICD-10-CM

## 2017-05-22 DIAGNOSIS — Z85.819 HISTORY OF NASOPHARYNGEAL CANCER: ICD-10-CM

## 2017-05-22 PROCEDURE — 70491 CT SOFT TISSUE NECK W/DYE: CPT

## 2017-05-22 PROCEDURE — 700117 HCHG RX CONTRAST REV CODE 255: Performed by: FAMILY MEDICINE

## 2017-05-22 RX ADMIN — IOHEXOL 100 ML: 350 INJECTION, SOLUTION INTRAVENOUS at 09:45

## 2017-05-23 RX ORDER — SIMVASTATIN 20 MG
TABLET ORAL
Refills: 0 | OUTPATIENT
Start: 2017-05-23

## 2017-05-23 RX ORDER — SIMVASTATIN 20 MG
20 TABLET ORAL EVERY EVENING
Qty: 30 TAB | Refills: 3 | Status: SHIPPED | OUTPATIENT
Start: 2017-05-23 | End: 2017-06-15 | Stop reason: SDUPTHER

## 2017-05-30 ENCOUNTER — NON-PROVIDER VISIT (OUTPATIENT)
Dept: MEDICAL GROUP | Age: 72
End: 2017-05-30
Payer: MEDICARE

## 2017-05-30 DIAGNOSIS — H61.20 WAX IN EAR: ICD-10-CM

## 2017-05-30 PROCEDURE — 99999 PR NO CHARGE: CPT | Performed by: FAMILY MEDICINE

## 2017-05-30 NOTE — PROGRESS NOTES
Yoselin Jorge is a 71 y.o. female here for a non-provider visit for left ear wax removal    If abnormal was an in office provider notified today (if so, indicate provider)? No  Routed to PCP? Yes

## 2017-05-30 NOTE — MR AVS SNAPSHOT
EvelynMeka Pelaez Jorge   2017 1:15 PM   Non-Provider Visit   MRN: [id number]    Department:  [address] [place] [address]   Dept Phone:  [phone]    Description:  Female : 1945   Provider:  ALICIA ORR MA           Reason for Visit     Cerumen Impaction left ear lavage      Allergies as of 2017     No Known Allergies      You were diagnosed with     Wax in ear   [[id number]]         Vital Signs     Last Menstrual Period Smoking Status                1995 Never Smoker           Basic Information     Date Of Birth Sex Race Ethnicity Preferred Language    1945 Female White Non- English      Your appointments     2017  3:40 PM   Established Patient with Joselin Caro M.D.   [facility] (--)    [address] [place] [address] [facility] [place] [address] [phone]           You will be receiving a confirmation call a few days before your appointment from our automated call confirmation system.            Aug 30, 2017  9:30 AM   Non Provider 1 with ALICIA ORR MA   [address]    [facility] [place] [address] [phone]           You will be receiving a confirmation call a few days before your appointment from our automated call confirmation system.            Oct 24, 2017 10:20 AM   Established Patient with Britt Benjamin M.D.   [facility]    [address]  [place] [address] [phone]           You will be receiving a confirmation call a few days before your appointment from our automated call confirmation system.              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   3/11/2013 - Present    Family history of melanoma Z80.8   3/11/2013 - Present    Other lymphedema I89.0   3/11/2013 - Present    Preventative health care Z00.00    3/17/2013 - Present    Impaired fasting glucose R73.01   3/17/2013 - Present    SENSORINEURAL HEARING LOSS    3/17/2013 - Present    Osteopenia M85.80   3/30/2013 - Present    Swallowing difficulty R13.10   1/13/2015 - Present    Hypothyroidism (acquired) E03.9   1/13/2015 - Present    S/P dilatation of esophageal stricture Z98.890, Z87.19   5/16/2015 - Present    S/P thyroidectomy E89.0   5/22/2015 - Present    Impacted cerumen of left ear H61.22   2/16/2017 - Present    Obesity (BMI 30-39.9) E66.9   5/11/2017 - Present    Elevated hemoglobin A1c R73.09   5/11/2017 - Present    Lymphadenopathy of right cervical region R59.0   5/11/2017 - Present    Neck pain on right side M54.2   5/11/2017 - Present      Health Maintenance        Date Due Completion Dates    IMM ZOSTER VACCINE 6/20/2006 ---    IMM PNEUMOCOCCAL 65+ (ADULT) LOW/MEDIUM RISK SERIES (2 of 2 - PPSV23) 10/16/2015 10/16/2014    BONE DENSITY 4/28/2017 4/28/2015, 3/27/2013    MAMMOGRAM 5/11/2018 5/11/2017, 4/19/2016, 4/28/2015, 3/31/2014, 12/3/2012 (Done)    Override on 12/3/2012: Done    IMM DTaP/Tdap/Td Vaccine (2 - Td) 10/27/2019 10/27/2009    COLONOSCOPY 1/5/2026 1/5/2016            Current Immunizations     13-VALENT PCV PREVNAR 10/16/2014    Influenza TIV (IM) 10/20/2013    Influenza Vaccine Adult HD 10/25/2016 11:06 AM, 10/12/2015    Influenza Vaccine Quad Inj (Pf) 10/9/2014    Tdap Vaccine 10/27/2009      Below and/or attached are the medications your provider expects you to take. Review all of your home medications and newly ordered medications with your provider and/or pharmacist. Follow medication instructions as directed by your provider and/or pharmacist. Please keep your medication list with you and share with your provider. Update the information when medications are discontinued, doses are changed, or new medications (including over-the-counter products) are added; and carry medication information at all times in the event of emergency  situations     Allergies:  No Known Allergies          Medications  Valid as of: May 30, 2017 -  1:33 PM    Generic Name Brand Name Tablet Size Instructions for use    Aspirin (Tablet Delayed Response) ECOTRIN 81 MG Take 81 mg by mouth every day. 1 tab 3x a week        Cholecalciferol (Tab) cholecalciferol 1000 UNIT Take 1,000 Units by mouth every day.        Famotidine-Ca Carb-Mag Hydrox   Take  by mouth.        Levothyroxine Sodium (Tab) SYNTHROID 112 MCG TAKE 1 TAB BY MOUTH EVERYDAY.        Multiple Vitamins-Minerals   Take  by mouth.        Naproxen Sodium (Tab) ANAPROX 220 MG         Probiotic Product   Take  by mouth.        Simvastatin (Tab) ZOCOR 20 MG Take 1 Tab by mouth every evening.        Zoledronic Acid (Solution) RECLAST 5 MG/100ML 5 mg by Intravenous route Once.        .                 Medicines prescribed today were sent to:     [facility] [id number] [place] [address] [facility]    [address] [facility] [address]    Phone: [phone] Fax: [phone]    Open 24 Hours?: No    [facility] - [place], [place] - [address] [facility]    [address] [facility] [address]    Phone: [phone] Fax: [phone]    Open 24 Hours?: No      Medication refill instructions:       If your prescription bottle indicates you have medication refills left, it is not necessary to call your provider’s office. Please contact your pharmacy and they will refill your medication.    If your prescription bottle indicates you do not have any refills left, you may request refills at any time through one of the following ways: The online Yones system (except Urgent Care), by calling your provider’s office, or by asking your pharmacy to contact your provider’s office with a refill request. Medication refills are processed only during regular business hours and may not be available until the next business day. Your provider may request additional information or to have a  follow-up visit with you prior to refilling your medication.   *Please Note: Medication refills are assigned a new Rx number when refilled electronically. Your pharmacy may indicate that no refills were authorized even though a new prescription for the same medication is available at the pharmacy. Please request the medicine by name with the pharmacy before contacting your provider for a refill.           [email or web address] Access Code: Activation code not generated  Current Trunk Show Status: Active

## 2017-06-09 ENCOUNTER — TELEPHONE (OUTPATIENT)
Dept: MEDICAL GROUP | Facility: LAB | Age: 72
End: 2017-06-09

## 2017-06-09 NOTE — TELEPHONE ENCOUNTER
ESTABLISHED PATIENT PRE-VISIT PLANNING     Note: Patient will not be contacted if there is no indication to call.     1.  Reviewed notes from the last few office visits within the medical group: Yes    2.  If any orders were placed at last visit or intended to be done for this visit (i.e. 6 mos follow-up), do we have Results/Consult Notes?        •  Labs - Labs ordered, completed and results are in chart.  From Endocrinologist       •  Imaging - Imaging ordered, completed and results are in chart.       •  Referrals - Referral ordered, patient was seen and consult notes are in chart. Care Teams updated  YES.    3. Is this appointment scheduled as a Hospital Follow-Up? No    4.  Immunizations were updated in Mobile System 7 using WebIZ?: Yes       •  Web Iz Recommendations: HEPATITIS A  MMR  PNEUMOVAX (PPSV23) TDAP ZOSTAVAX (Shingles)    5.  Patient is due for the following Health Maintenance Topics:   Health Maintenance Due   Topic Date Due   • IMM ZOSTER VACCINE  06/20/2006   • IMM PNEUMOCOCCAL 65+ (ADULT) LOW/MEDIUM RISK SERIES (2 of 2 - PPSV23) 10/16/2015   • Annual Wellness Visit  10/12/2016   • BONE DENSITY  04/28/2017       - Patient has completed PREVNAR (PCV13)  Immunization(s) per WebIZ. Chart has been updated.    6.  Patient was NOT informed to arrive 15 min prior to their scheduled appointment and bring in their medication bottles.

## 2017-06-12 ENCOUNTER — APPOINTMENT (OUTPATIENT)
Dept: MEDICAL GROUP | Facility: LAB | Age: 72
End: 2017-06-12
Payer: MEDICARE

## 2017-06-15 ENCOUNTER — OFFICE VISIT (OUTPATIENT)
Dept: MEDICAL GROUP | Facility: LAB | Age: 72
End: 2017-06-15
Payer: MEDICARE

## 2017-06-15 VITALS
BODY MASS INDEX: 29.02 KG/M2 | OXYGEN SATURATION: 96 % | HEART RATE: 68 BPM | DIASTOLIC BLOOD PRESSURE: 72 MMHG | TEMPERATURE: 98.3 F | RESPIRATION RATE: 12 BRPM | SYSTOLIC BLOOD PRESSURE: 120 MMHG | WEIGHT: 170 LBS | HEIGHT: 64 IN

## 2017-06-15 DIAGNOSIS — E78.5 HYPERLIPIDEMIA, UNSPECIFIED HYPERLIPIDEMIA TYPE: ICD-10-CM

## 2017-06-15 DIAGNOSIS — Z85.819 HISTORY OF NASOPHARYNGEAL CANCER: ICD-10-CM

## 2017-06-15 DIAGNOSIS — R13.10 DYSPHAGIA, UNSPECIFIED TYPE: ICD-10-CM

## 2017-06-15 PROCEDURE — 99214 OFFICE O/P EST MOD 30 MIN: CPT | Performed by: FAMILY MEDICINE

## 2017-06-15 RX ORDER — SIMVASTATIN 20 MG
20 TABLET ORAL EVERY EVENING
Qty: 90 TAB | Refills: 1 | Status: SHIPPED | OUTPATIENT
Start: 2017-06-15 | End: 2017-12-27 | Stop reason: SDUPTHER

## 2017-06-15 NOTE — MR AVS SNAPSHOT
"        Yoselin Jorge   6/15/2017 9:20 AM   Office Visit   MRN: [id number]    Department:  [facility]   Dept Phone:  [phone]    Description:  Female : 1945   Provider:  Joselin Caro M.D.           Reason for Visit     Follow-Up CT Scan    Medication Refill simvastatin/ patient would like to hand carry RX to phrarmacy/ pending in orders      Allergies as of 6/15/2017     No Known Allergies      You were diagnosed with     Hyperlipidemia, unspecified hyperlipidemia type   [7622873]       History of nasopharyngeal cancer   [[id number]]       Dysphagia, unspecified type   [[id number]]         Vital Signs     Blood Pressure Pulse Temperature Respirations Height Weight    120/72 mmHg 68 36.8 °C (98.3 °F) 12 1.613 m (5' 3.5\") 77.111 kg (170 lb)    Body Mass Index Oxygen Saturation Last Menstrual Period Smoking Status          29.64 kg/m2 96% 1995 Never Smoker         Basic Information     Date Of Birth Sex Race Ethnicity Preferred Language    1945 Female White Non- English      Your appointments     Aug 30, 2017  9:30 AM   Non Provider 1 with [facility]    [address] [facility] [address] [phone]           You will be receiving a confirmation call a few days before your appointment from our automated call confirmation system.            Sep 19, 2017 10:20 AM   Established Patient with Joselin Caro M.D.   [facility] (--)    [address] [facility] [address] [place] [address] [phone]           You will be receiving a confirmation call a few days before your appointment from our automated call confirmation system.            Oct 24, 2017 10:20 AM   Established Patient with Britt Benjamin M.D.   [facility]    [address]  [facility] [address] [phone]           You will be receiving a confirmation call a few days before " your appointment from our automated call confirmation system.              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   3/11/2013 - Present    Family history of melanoma Z80.8   3/11/2013 - Present    Other lymphedema I89.0   3/11/2013 - Present    Preventative health care Z00.00   3/17/2013 - Present    Impaired fasting glucose R73.01   3/17/2013 - Present    SENSORINEURAL HEARING LOSS    3/17/2013 - Present    Osteopenia M85.80   3/30/2013 - Present    Swallowing difficulty R13.10   1/13/2015 - Present    Hypothyroidism (acquired) E03.9   1/13/2015 - Present    S/P dilatation of esophageal stricture Z98.890, Z87.19   5/16/2015 - Present    S/P thyroidectomy E89.0   5/22/2015 - Present    Impacted cerumen of left ear H61.22   2/16/2017 - Present    Obesity (BMI 30-39.9) E66.9   5/11/2017 - Present    Elevated hemoglobin A1c R73.09   5/11/2017 - Present    Lymphadenopathy of right cervical region R59.0   5/11/2017 - Present    Neck pain on right side M54.2   5/11/2017 - Present      Health Maintenance        Date Due Completion Dates    IMM ZOSTER VACCINE 6/20/2006 ---    IMM PNEUMOCOCCAL 65+ (ADULT) LOW/MEDIUM RISK SERIES (2 of 2 - PPSV23) 10/16/2015 10/16/2014    BONE DENSITY 4/28/2017 4/28/2015, 3/27/2013    MAMMOGRAM 5/11/2018 5/11/2017, 4/19/2016, 4/28/2015, 3/31/2014, 12/3/2012 (Done)    Override on 12/3/2012: Done    IMM DTaP/Tdap/Td Vaccine (2 - Td) 10/27/2019 10/27/2009    COLONOSCOPY 1/5/2026 1/5/2016            Current Immunizations     13-VALENT PCV PREVNAR 10/16/2014    Influenza TIV (IM) 10/20/2013    Influenza Vaccine Adult HD 10/25/2016 11:06 AM, 10/12/2015    Influenza Vaccine Quad Inj (Pf) 10/9/2014    Tdap Vaccine 10/27/2009      Below and/or attached are the medications your provider expects you to take. Review all of your home medications and newly ordered medications with your  provider and/or pharmacist. Follow medication instructions as directed by your provider and/or pharmacist. Please keep your medication list with you and share with your provider. Update the information when medications are discontinued, doses are changed, or new medications (including over-the-counter products) are added; and carry medication information at all times in the event of emergency situations     Allergies:  No Known Allergies          Medications  Valid as of: Nani 15, 2017 -  9:47 AM    Generic Name Brand Name Tablet Size Instructions for use    Aspirin (Tablet Delayed Response) ECOTRIN 81 MG Take 81 mg by mouth every day. 1 tab 3x a week        Cholecalciferol (Tab) cholecalciferol 1000 UNIT Take 1,000 Units by mouth every day.        Famotidine-Ca Carb-Mag Hydrox   Take  by mouth.        Levothyroxine Sodium (Tab) SYNTHROID 112 MCG TAKE 1 TAB BY MOUTH EVERYDAY.        Multiple Vitamins-Minerals   Take  by mouth.        Naproxen Sodium (Tab) ANAPROX 220 MG         Probiotic Product   Take  by mouth.        Simvastatin (Tab) ZOCOR 20 MG Take 1 Tab by mouth every evening.        Zoledronic Acid (Solution) RECLAST 5 MG/100ML 5 mg by Intravenous route Once.        .                 Medicines prescribed today were sent to:     [facility] [place] - [address] [facility]    [address] [facility] [address]    Phone: [phone] Fax: [phone]    Open 24 Hours?: No      Medication refill instructions:       If your prescription bottle indicates you have medication refills left, it is not necessary to call your provider’s office. Please contact your pharmacy and they will refill your medication.    If your prescription bottle indicates you do not have any refills left, you may request refills at any time through one of the following ways: The online ULTRA Testing system (except Urgent Care), by calling your provider’s office, or by asking your pharmacy to contact your provider’s office with a refill  request. Medication refills are processed only during regular business hours and may not be available until the next business day. Your provider may request additional information or to have a follow-up visit with you prior to refilling your medication.   *Please Note: Medication refills are assigned a new Rx number when refilled electronically. Your pharmacy may indicate that no refills were authorized even though a new prescription for the same medication is available at the pharmacy. Please request the medicine by name with the pharmacy before contacting your provider for a refill.        Your To Do List     Future Labs/Procedures Complete By Expires    HEPATIC FUNCTION PANEL  As directed 6/15/2018      Referral     A referral request has been sent to our patient care coordination department. Please allow 3-5 business days for us to process this request and contact you either by phone or mail. If you do not hear from us by the 5th business day, please call us at [phone].           Frazr Access Code: Activation code not generated  Current Frazr Status: Active

## 2017-06-15 NOTE — PROGRESS NOTES
Chief Complaint   Patient presents with   • Follow-Up     CT Scan   • Medication Refill     simvastatin/ patient would like to hand carry RX to phrarmacy/ pending in orders       HISTORY OF PRESENT ILLNESS: Patient is a 71 y.o. female established patient who presents today to follow up on imaging     Lymphadenopathy of right cervical region with history of nasopharyngeal cancer   Patient is here today for follow-up. She did recently have a CT of her neck after an ultrasound revealed a couple of lymph nodes. This CT came back negative. She was also referred to ENT however has not been able to see Dr. Santamaria and she is not sure what to do next.     Dysphagia  This is a chronic problem. Patient states that her swallowing has not changed. She has had her esophagus dilated in the past however they were concerned with the tissue being thin due to her prior radiation therapy.     Hyperlipidemia  This is a chronic problem. Patient is requesting a refill of simvastatin that she can take to her pharmacy. She had labs done in April 2017. Her lipid panel was normal. She is due to have her liver function tested.    No CP currently, no SOB, no n/v     Patient Active Problem List    Diagnosis Date Noted   • Obesity (BMI 30-39.9) 05/11/2017   • Elevated hemoglobin A1c 05/11/2017   • Lymphadenopathy of right cervical region 05/11/2017   • Neck pain on right side 05/11/2017   • Impacted cerumen of left ear 02/16/2017   • S/P thyroidectomy 05/22/2015   • S/P dilatation of esophageal stricture 05/16/2015   • Swallowing difficulty 01/13/2015   • Hypothyroidism (acquired) 01/13/2015   • Osteopenia 03/30/2013   • Preventative health care 03/17/2013   • Impaired fasting glucose 03/17/2013   • SENSORINEURAL HEARING LOSS 03/17/2013   • Hyperlipidemia 03/11/2013   • History of nasopharyngeal cancer 03/11/2013   • Family history of melanoma 03/11/2013   • Other lymphedema 03/11/2013        Allergies:Review of patient's allergies indicates no  "known allergies.    Current Outpatient Prescriptions   Medication Sig Dispense Refill   • simvastatin (ZOCOR) 20 MG Tab Take 1 Tab by mouth every evening. 30 Tab 3   • levothyroxine (SYNTHROID) 112 MCG Tab TAKE 1 TAB BY MOUTH EVERYDAY. 90 Tab 1   • zoledronic Acid (RECLAST) 5 MG/100ML Solution IVPB premix 5 mg by Intravenous route Once.     • Probiotic Product (PROBIOTIC DAILY PO) Take  by mouth.     • Famotidine-Ca Carb-Mag Hydrox (ACID REDUCER + ANTACID PO) Take  by mouth.     • aspirin EC (ECOTRIN) 81 MG TBEC Take 81 mg by mouth every day. 1 tab 3x a week 30 Tab    • naproxen (ALEVE) 220 MG tablet  60 Tab    • vitamin D (CHOLECALCIFEROL) 1000 UNIT TABS Take 1,000 Units by mouth every day. 30 Tab 11   • Multiple Vitamins-Minerals (WOMENS BONE HEALTH PO) Take  by mouth.       No current facility-administered medications for this visit.       Social History   Substance Use Topics   • Smoking status: Never Smoker    • Smokeless tobacco: Never Used   • Alcohol Use: 0.6 oz/week     1 Glasses of wine per week      Comment: on rare ocassion       Social History     Social History Narrative    Retired Sales.    . 2 children.    Moved from [place] to [place] 2011.        Family History   Problem Relation Age of Onset   • Cancer Brother      melanoma, liver cancer   • Cancer Brother      prostate cancer   • Diabetes Father    • Lung Disease Mother 68     Emphazema   • Cancer Brother 55     liver cancer   • Cancer Brother 55     Prostate cancer   • Heart Disease Maternal Grandmother 63       ROS:      Exam:    Blood pressure 120/72, pulse 68, temperature 36.8 °C (98.3 °F), resp. rate 12, height 1.613 m (5' 3.5\"), weight 77.111 kg (170 lb), last menstrual period 08/01/1995, SpO2 96 %.       Physical Exam   Constitutional:   Appears well-developed and well-nourished. Is active and cooperative.  Non-toxic appearance.   Head: Normocephalic and atraumatic.   Right Ear: External ear normal. Left Ear: External ear normal. "   Neck: firmness noted bilaterally    Eyes: Conjunctivae, EOM and lids are normal. No scleral icterus.   Neurological: Alert. No tremor. No display of seizure activity. Coordination and gait normal.   Skin: Skin is warm and dry. Patient is not diaphoretic.   Psychiatric: patient has a normal mood and affect; speech is normal and behavior is normal.         5/10/2017 2:18 PM    HISTORY/REASON FOR EXAM:  Swelling  Neck swelling. History of oropharyngeal cancer    TECHNIQUE/EXAM DESCRIPTION:  Ultrasound of the soft tissues of the head and neck.    COMPARISON:  None    FINDINGS:  The thyroid gland is surgically absent. No thyroid tissue seen.    No fluid collection identified.    There is a 6 x 5 x 4 mm hypoechoic area in the midline neck. There is a 4 x 3 x 5 mm hypoechoic nodule in the right submandibular region.         Impression        2 nonspecific hypoechoic masses measuring up to 6 mm could represent small lymph nodes. They are somewhat nonspecific in ultrasound appearance.           5/22/2017 9:20 AM    HISTORY/REASON FOR EXAM:  This corresponds with the right submandibular region inferior to the parotid gland along the sternocleidomastoid muscle. No mass identified in the area of palpable concern. The external jugular vein is identified adjacent to the   marker is uncertain if this is the palpable abnormality. No lymphadenopathy identified in the area of palpable concern. History of oropharyngeal cancer. Hypoechoic masses identified on neck ultrasound.      TECHNIQUE/EXAM DESCRIPTION AND NUMBER OF VIEWS:  CT soft tissue neck with contrast.    The study was performed on a helical multidetector CT scanner. Contiguous thin section helical images were obtained of the neck from the skull base through the thoracic inlet.    80 mL of Omnipaque 350 nonionic contrast was injected intravenously.    Low dose optimization technique was utilized for this CT exam including automated exposure control and adjustment of the mA  and/or kV according to patient size.    COMPARISON: Neck ultrasound May 10, 2017    FINDINGS:  The area of concern in the right side of the neck was marked. There are no mass lesions or fluid collections in the deep or superficial soft tissues of the neck. There is no abnormal enhancement. Cervical lymph nodes show normal size and configuration.   There is no pathologic adenopathy evident. Vascular enhancement of the cervical carotid and vertebral arteries and internal jugular veins appears intact.    The nasopharynx, oropharynx, and hypopharynx show normal airways and no abnormal soft tissue swelling. The larynx and trachea are unremarkable. The prevertebral soft tissues appear intact. The parapharyngeal spaces show normal symmetry and fat planes.    The tongue and floor of the mouth are unremarkable. The submandibular, sublingual, and submental spaces appear intact. The parotid and submandibular glands show normal size and density. No abnormal calcifications are evident.    The structures at the thoracic inlet and superior mediastinum within the field of view are unremarkable. The thyroid gland is surgically absent.    The bony structures show no particular abnormality.  No intracranial abnormalities are evident.         Impression        CT of the neck soft tissues with contrast within normal limits.         Assessment/Plan:     1. Hyperlipidemia, unspecified hyperlipidemia type  Refilled medication. Patient is due for liver function panel  - simvastatin (ZOCOR) 20 MG Tab; Take 1 Tab by mouth every evening.  Dispense: 90 Tab; Refill: 1  - HEPATIC FUNCTION PANEL; Future    2. History of nasopharyngeal cancer  Discussed with patient. There was no abnormal lymphadenopathy appreciated on the CT scan of the neck.  CT scan of the neck soft tissues with contrast was within normal limits. Referral to ENT for further evaluation  - REFERRAL TO ENT    3. Dysphagia, unspecified type  Referral to ENT.  - REFERRAL TO ENT      Follow-up 3 months, sooner when necessary.    Please note that this dictation was created using voice recognition software. I have made every reasonable attempt to correct obvious errors, but I expect that there are errors of grammar and possibly content that I did not discover before finalizing the note.

## 2017-06-17 ENCOUNTER — PATIENT OUTREACH (OUTPATIENT)
Dept: HEALTH INFORMATION MANAGEMENT | Facility: OTHER | Age: 72
End: 2017-06-17

## 2017-06-17 NOTE — PROGRESS NOTES
Outcome: Left Message    WebIZ Checked & Epic Updated:  yes    HealthConnect Verified: yes    Attempt # 1

## 2017-06-19 NOTE — PROGRESS NOTES
Attempt #:2    WebIZ Checked & Epic Updated: yes  HealthConnect Verified: yes  Verify PCP: yes    Communication Preference Obtained: yes     Review Care Team: yes    Annual Wellness Visit Scheduling  1. Scheduling Status:Scheduled    Care Gap Scheduling      Health Maintenance Due   Topic Date Due   • IMM ZOSTER VACCINE  REPORTS ALREADY HAVING,  UPDATED   • IMM PNEUMOCOCCAL 65+ (ADULT) LOW/MEDIUM RISK SERIES (2 of 2 - PPSV23) SCHEDULED   • Annual Wellness Visit  SCHEDULED   • BONE DENSITY  WANTS TO SPEAK TO DR. COREA BEFORE HAVING         MyChart Activation: already active  emids Isiah: no  Virtual Visits: no  Opt In to Text Messages: no

## 2017-08-22 ENCOUNTER — HOSPITAL ENCOUNTER (OUTPATIENT)
Dept: RADIOLOGY | Facility: MEDICAL CENTER | Age: 72
End: 2017-08-22
Attending: PHYSICAL MEDICINE & REHABILITATION
Payer: MEDICARE

## 2017-08-22 DIAGNOSIS — M51.36 DEGENERATION OF LUMBAR INTERVERTEBRAL DISC: ICD-10-CM

## 2017-08-22 PROCEDURE — 72148 MRI LUMBAR SPINE W/O DYE: CPT

## 2017-08-30 ENCOUNTER — APPOINTMENT (OUTPATIENT)
Dept: MEDICAL GROUP | Age: 72
End: 2017-08-30
Payer: MEDICARE

## 2017-09-01 ENCOUNTER — NON-PROVIDER VISIT (OUTPATIENT)
Dept: MEDICAL GROUP | Age: 72
End: 2017-09-01
Payer: MEDICARE

## 2017-09-01 DIAGNOSIS — H61.22 IMPACTED CERUMEN OF LEFT EAR: ICD-10-CM

## 2017-09-01 PROCEDURE — 99999 PR NO CHARGE: CPT | Performed by: INTERNAL MEDICINE

## 2017-09-01 NOTE — PROGRESS NOTES
Yoselin Pelaez Jorge is a 72 y.o. female here for a non-provider visit for Ear lavage right ear    If abnormal was an in office provider notified today (if so, indicate provider)? No  Routed to PCP? Yes

## 2017-09-08 ENCOUNTER — NON-PROVIDER VISIT (OUTPATIENT)
Dept: MEDICAL GROUP | Age: 72
End: 2017-09-08
Payer: MEDICARE

## 2017-09-08 DIAGNOSIS — H61.20 WAX IN EAR: ICD-10-CM

## 2017-09-08 PROCEDURE — 99999 PR NO CHARGE: CPT | Performed by: INTERNAL MEDICINE

## 2017-09-08 NOTE — PROGRESS NOTES
Yoselin Jorge is a 72 y.o. female here for a non-provider visit for left ear wax removal    If abnormal was an in office provider notified today (if so, indicate provider)? Yes  Routed to PCP? Yes

## 2017-09-22 ENCOUNTER — NON-PROVIDER VISIT (OUTPATIENT)
Dept: MEDICAL GROUP | Facility: LAB | Age: 72
End: 2017-09-22
Payer: MEDICARE

## 2017-09-22 DIAGNOSIS — Z23 NEED FOR VACCINATION: ICD-10-CM

## 2017-09-22 PROCEDURE — G0008 ADMIN INFLUENZA VIRUS VAC: HCPCS | Performed by: FAMILY MEDICINE

## 2017-09-22 PROCEDURE — 90662 IIV NO PRSV INCREASED AG IM: CPT | Performed by: FAMILY MEDICINE

## 2017-09-22 NOTE — PROGRESS NOTES
"Yoselin Jorge is a 72 y.o. female here for a non-provider visit for:   FLU    Reason for immunization: Annual Flu Vaccine  Immunization records indicate need for vaccine: Yes, confirmed with Epic  Minimum interval has been met for this vaccine: YES  ABN completed: Not Indicated    Order and dose verified by:   VIS Dated  8/7/15 was given to patient: Yes  All IAC Questionnaire questions were answered \"No.\"    Patient tolerated injection and no adverse effects were observed or reported: Yes    Pt scheduled for next dose in series: Not Indicated  "

## 2017-09-27 RX ORDER — LEVOTHYROXINE SODIUM 112 UG/1
TABLET ORAL
Qty: 90 TAB | Refills: 1 | Status: SHIPPED | OUTPATIENT
Start: 2017-09-27 | End: 2018-04-01 | Stop reason: SDUPTHER

## 2017-10-17 ENCOUNTER — TELEPHONE (OUTPATIENT)
Dept: MEDICAL GROUP | Facility: LAB | Age: 72
End: 2017-10-17

## 2017-10-17 NOTE — TELEPHONE ENCOUNTER
ANNUAL WELLNESS VISIT PRE-VISIT PLANNING     1.  Reviewed note from last office visit with PCP: YES    2.  If any orders were placed at last visit, do we have Results/Consult Notes?        •  Labs - Labs were not ordered at last office visit.   Note: If patient appointment is for lab review and patient did not complete labs,                check with provider if OK to reschedule patient until labs completed.       •  Imaging - Imaging was not ordered at last office visit.       •  Referrals - Referral ordered, patient has NOT been seen.    3.  Immunizations were updated in [facility] using WebIZ?: Yes       •  WebIZ Recommendations: PNEUMOVAX (PPSV23), TDAP and ZOSTAVAX (Shingles)       •  Is patient due for Tdap? YES. Patient was notified of copay/out of pocket cost.       •  Is patient due for Shingles? YES. Patient was notified of copay/out of pocket cost.     4.  Patient is due for the following Health Maintenance Topics:   Health Maintenance Due   Topic Date Due   • IMM ZOSTER VACCINE  06/20/2006   • IMM PNEUMOCOCCAL 65+ (ADULT) LOW/MEDIUM RISK SERIES (2 of 2 - PPSV23) 10/16/2015   • Annual Wellness Visit  10/12/2016   • BONE DENSITY  04/28/2017       - Patient has completed FLU and PREVNAR (PCV13)  Immunization(s) per WebIZ. Chart has been updated.      5.  Reviewed/Updated the following with patient:       •   Preferred Pharmacy? YES       •   Preferred Lab? YES       •   Preferred Communication? YES       •   Allergies? YES       •   Medications? YES. Was Abstract Encounter opened and chart updated? YES       •   Social History? YES. Was Abstract Encounter opened and chart updated? YES       •   Family History (document living status of immediate family members and if + hx of cancer, diabetes, hypertension, hyperlipidemia, heart attack, stroke) YES. Was Abstract Encounter opened and chart updated? YES    6.  Care Team Updated:       •   DME Company (gait device, O2, CPAP, etc.): NO       •   Other Specialists (eye  doctor, derm, GYN, cardiology, endo, etc): YES    7.  Patient has the following Care Path diagnoses on Problem List:  NONE    8.  Specialty Comments was updated with diagnosis information provided by SCP: N\A    9.  Patient was advised: “This is a free wellness visit. The provider will screen for medical conditions to help you stay healthy. If you have other concerns to address you may be asked to discuss these at a separate visit or there may be an additional fee.”     6.  Patient was informed to arrive 15 min prior to their scheduled appointment and bring in their medication bottles.

## 2017-10-24 ENCOUNTER — OFFICE VISIT (OUTPATIENT)
Dept: MEDICAL GROUP | Facility: LAB | Age: 72
End: 2017-10-24
Payer: MEDICARE

## 2017-10-24 VITALS
TEMPERATURE: 97.7 F | DIASTOLIC BLOOD PRESSURE: 80 MMHG | WEIGHT: 173 LBS | HEIGHT: 65 IN | SYSTOLIC BLOOD PRESSURE: 120 MMHG | BODY MASS INDEX: 28.82 KG/M2 | HEART RATE: 82 BPM

## 2017-10-24 DIAGNOSIS — Z00.00 ROUTINE GENERAL MEDICAL EXAMINATION AT A HEALTH CARE FACILITY: ICD-10-CM

## 2017-10-24 DIAGNOSIS — R13.10 DYSPHAGIA, UNSPECIFIED TYPE: ICD-10-CM

## 2017-10-24 DIAGNOSIS — H91.90 HEARING LOSS, UNSPECIFIED HEARING LOSS TYPE, UNSPECIFIED LATERALITY: ICD-10-CM

## 2017-10-24 DIAGNOSIS — M85.80 OSTEOPENIA, UNSPECIFIED LOCATION: ICD-10-CM

## 2017-10-24 DIAGNOSIS — Z23 NEED FOR 23-POLYVALENT PNEUMOCOCCAL POLYSACCHARIDE VACCINE: ICD-10-CM

## 2017-10-24 DIAGNOSIS — Z85.819 HISTORY OF NASOPHARYNGEAL CANCER: ICD-10-CM

## 2017-10-24 DIAGNOSIS — R73.01 IMPAIRED FASTING GLUCOSE: ICD-10-CM

## 2017-10-24 DIAGNOSIS — Z87.19 S/P DILATATION OF ESOPHAGEAL STRICTURE: ICD-10-CM

## 2017-10-24 DIAGNOSIS — E78.5 HYPERLIPIDEMIA, UNSPECIFIED HYPERLIPIDEMIA TYPE: ICD-10-CM

## 2017-10-24 DIAGNOSIS — E03.9 HYPOTHYROIDISM (ACQUIRED): ICD-10-CM

## 2017-10-24 DIAGNOSIS — Z98.890 S/P DILATATION OF ESOPHAGEAL STRICTURE: ICD-10-CM

## 2017-10-24 DIAGNOSIS — E89.0 S/P THYROIDECTOMY: ICD-10-CM

## 2017-10-24 DIAGNOSIS — Z80.8 FAMILY HISTORY OF MELANOMA: ICD-10-CM

## 2017-10-24 DIAGNOSIS — R73.09 ELEVATED HEMOGLOBIN A1C: ICD-10-CM

## 2017-10-24 PROBLEM — E66.9 OBESITY (BMI 30-39.9): Status: RESOLVED | Noted: 2017-05-11 | Resolved: 2017-10-24

## 2017-10-24 PROBLEM — M54.2 NECK PAIN ON RIGHT SIDE: Status: RESOLVED | Noted: 2017-05-11 | Resolved: 2017-10-24

## 2017-10-24 PROBLEM — R59.0 LYMPHADENOPATHY OF RIGHT CERVICAL REGION: Status: RESOLVED | Noted: 2017-05-11 | Resolved: 2017-10-24

## 2017-10-24 PROBLEM — H61.22 IMPACTED CERUMEN OF LEFT EAR: Status: RESOLVED | Noted: 2017-02-16 | Resolved: 2017-10-24

## 2017-10-24 PROCEDURE — G0439 PPPS, SUBSEQ VISIT: HCPCS | Mod: 25 | Performed by: FAMILY MEDICINE

## 2017-10-24 PROCEDURE — G0009 ADMIN PNEUMOCOCCAL VACCINE: HCPCS | Performed by: FAMILY MEDICINE

## 2017-10-24 PROCEDURE — 90732 PPSV23 VACC 2 YRS+ SUBQ/IM: CPT | Performed by: FAMILY MEDICINE

## 2017-10-24 ASSESSMENT — PATIENT HEALTH QUESTIONNAIRE - PHQ9: CLINICAL INTERPRETATION OF PHQ2 SCORE: 0

## 2017-10-24 NOTE — PROGRESS NOTES
Chief Complaint   Patient presents with   • Annual Wellness Visit         HPI:  Yoselin Ackerman is a 72 y.o. here for Medicare Annual Wellness Visit    Lower right back better after shots with Dr Santo     Patient Active Problem List    Diagnosis Date Noted   • Elevated hemoglobin A1c  Watching carb intake  05/11/2017   • S/P thyroidectomy  Sees specialist  05/22/2015   • S/P dilatation of esophageal stricture  Sees GI  05/16/2015   • Swallowing difficulty  Still not good . Thinks she needs new hearing aides. Does not want to see audiology at this time  01/13/2015   • Hypothyroidism (acquired)  Sees Dr Benjamin  01/13/2015   • Osteopenia  Due for dexa  03/30/2013   • Preventative health care 03/17/2013   • Impaired fasting glucose  Last a1c 5.7 4/2017 03/17/2013   • SENSORINEURAL HEARING LOSS  Hearing a little worse.  03/17/2013   • Hyperlipidemia  Last labs good. Due for LFTs. Taking medication  03/11/2013   • History of nasopharyngeal cancer  Having some swallowing difficulties secondary to treatment. Is not seen by oncology now. Recent CT of the soft tissues of neck was within normal limits  03/11/2013   • Family history of melanoma  Sees derm  03/11/2013       Current Outpatient Prescriptions   Medication Sig Dispense Refill   • levothyroxine (SYNTHROID) 112 MCG Tab TAKE 1 TAB BY MOUTH EVERYDAY. 90 Tab 1   • simvastatin (ZOCOR) 20 MG Tab Take 1 Tab by mouth every evening. 90 Tab 1   • Probiotic Product (PROBIOTIC DAILY PO) Take  by mouth.     • Famotidine-Ca Carb-Mag Hydrox (ACID REDUCER + ANTACID PO) Take  by mouth.     • aspirin EC (ECOTRIN) 81 MG TBEC Take 81 mg by mouth every day. 1 tab 3x a week 30 Tab    • naproxen (ALEVE) 220 MG tablet  60 Tab    • vitamin D (CHOLECALCIFEROL) 1000 UNIT TABS Take 1,000 Units by mouth every day. 30 Tab 11   • Multiple Vitamins-Minerals (WOMENS BONE HEALTH PO) Take  by mouth.     • zoledronic Acid (RECLAST) 5 MG/100ML Solution IVPB premix 5 mg by Intravenous route Once.       No  current facility-administered medications for this visit.         Patient is taking medications as noted in medication list.  Current supplements as per medication list.     Allergies: Review of patient's allergies indicates no known allergies.    Current social contact/activities: entertaining at home, traveling, visiting family     Is patient current with immunizations? No, due for PNEUMOVAX (PPSV23). Patient is interested in receiving PNEUMOVAX (PPSV23) today.    She  reports that she has never smoked. She has never used smokeless tobacco. She reports that she drinks about 0.6 oz of alcohol per week . She reports that she does not use drugs.  Counseling given: Yes        DPA/Advanced directive: Patient has Advanced Directive on file.     ROS:    Gait: Uses no assistive device   Ostomy: no   Other tubes: no   Amputations: no   Chronic oxygen use no   Last eye exam 8/2017   Wears hearing aids: yes   : Denies incontinence.     Depression Screening    Little interest or pleasure in doing things?  0 - not at all  Feeling down, depressed, or hopeless? 0 - not at all  Patient Health Questionnaire Score: 0    If depressive symptoms identified deferred to follow up visit unless specifically addressed in assessment and plan.    Interpretation of PHQ-9 Total Score   Score Severity   1-4 No Depression   5-9 Mild Depression   10-14 Moderate Depression   15-19 Moderately Severe Depression   20-27 Severe Depression    Screening for Cognitive Impairment    Three Minute Recall (apple, watch, ajit)  3/3 Apple,ajit,table  Draw clock face with all 12 numbers set to the hand to show 10 minutes past 11 o'clock  5/5  If cognitive concerns identified, deferred for follow up unless specifically addressed in assessment and plan.    Fall Risk Assessment    Has the patient had two or more falls in the last year or any fall with injury in the last year?  No  If fall risk identified, deferred for follow up unless specifically addressed in  assessment and plan.    Safety Assessment    Throw rugs on floor.  No  Handrails on all stairs.  Yes  Good lighting in all hallways.  Yes  Difficulty hearing.  Yes  Patient counseled about all safety risks that were identified.    Functional Assessment ADLs    Are there any barriers preventing you from cooking for yourself or meeting nutritional needs?  No.    Are there any barriers preventing you from driving safely or obtaining transportation?  No.    Are there any barriers preventing you from using a telephone or calling for help?  No.    Are there any barriers preventing you from shopping?  No.    Are there any barriers preventing you from taking care of your own finances?  No.    Are there any barriers preventing you from managing your medications?  No.    Are you currently engaging any exercise or physical activity?  Yes.  Walking every day 10,000 steps.    Health Maintenance Summary                IMM PNEUMOCOCCAL 65+ (ADULT) LOW/MEDIUM RISK SERIES Overdue 10/16/2015      Done 10/16/2014 Imm Admin: Pneumococcal Conjugate Vaccine (Prevnar/PCV-13)    BONE DENSITY Overdue 4/28/2017      Done 4/28/2015 DS-BONE DENSITY STUDY (DEXA)     Patient has more history with this topic...    MAMMOGRAM Next Due 5/11/2018      Done 5/11/2017 MA-MAMMO SCREENING BILAT W/TOMOSYNTHESIS W/CAD     Patient has more history with this topic...    Annual Wellness Visit Next Due 10/25/2018      Done 10/24/2017      Patient has more history with this topic...    IMM DTaP/Tdap/Td Vaccine Next Due 10/27/2019      Done 10/27/2009 Imm Admin: Tdap Vaccine    COLONOSCOPY Next Due 1/5/2026      Done 1/5/2016 AMB REFERRAL TO GI FOR COLONOSCOPY          Patient Care Team:  Joselin Caro M.D. as PCP - General (Family Medicine)  KAREEM Guido as Consulting Physician (Dermatology)  Slick Pickett O.D. as Consulting Physician (Optometry)  Osiel Pizarro M.D. as Consulting Physician (Gastroenterology)  Raimundo Love DDS as Consulting  Physician (Dentistry)  Britt Benjamin M.D. as Consulting Physician (Endocrinology)  Jorge L Weinstein as Consulting Physician (Orthopaedics)  Mitchel Manuel M.D. as Consulting Physician (Otolaryngology)    Social History   Substance Use Topics   • Smoking status: Never Smoker   • Smokeless tobacco: Never Used   • Alcohol use 0.6 oz/week     1 Glasses of wine per week      Comment: on rare ocassion     Family History   Problem Relation Age of Onset   • Diabetes Father    • Lung Disease Mother 68     Emphazema   • Cancer Brother 55     liver cancer   • Cancer Brother 55     Prostate cancer   • Heart Disease Maternal Grandmother 63   • Cancer Brother      melanoma, liver cancer   • Cancer Brother      prostate cancer     She  has a past medical history of Family history of melanoma (3/11/2013); Flat foot(734) (3/17/2013); Heart burn; Herniated cervical disc; History of nasopharyngeal cancer (3/11/2013); Hyperlipidemia (3/11/2013); Impaired fasting glucose (3/17/2013); Indigestion; Other lymphedema (3/11/2013); S/P dilatation of esophageal stricture (5/16/2015); S/P thyroidectomy (5/22/2015); SENSORINEURAL HEARING LOSS (3/17/2013); Shoulder pain (2014); Unspecified cataract; and Unspecified urinary incontinence.       Past Surgical History:   Procedure Laterality Date   • THYROIDECTOMY TOTAL  11/19/2014    Performed by Lukas De Santiago M.D. at SURGERY SAME DAY [facility]   • BICEPS TENODESIS  5/22/2014    Performed by Cain Gerardo M.D. at SURGERY [facility]   • SHOULDER ARTHROSCOPY W/ ROTATOR CUFF REPAIR  5/22/2014    Performed by Cain Gerardo M.D. at [facility]   • SHOULDER DECOMPRESSION ARTHROSCOPIC  5/22/2014    Performed by Cain Gerardo M.D. at [facility]   • CLAVICLE DISTAL EXCISION  5/22/2014    Performed by Cain Gerardo M.D. at [facility]   • TENDON RELEASE FOOT  August 2013    [email or web address]    • OTHER ORTHOPEDIC SURGERY  2006    reconstruction  left foot   •  "CHOLECYSTECTOMY  1988   • BUNIONECTOMY  1988   • TUBAL LIGATION  1982   • CATARACT PHACO WITH IOL      Ry   • GYN SURGERY      tubal   • TONSILLECTOMY     • US-NEEDLE CORE BX-BREAST PANEL             Exam:     Blood pressure 120/80, pulse 82, temperature 36.5 °C (97.7 °F), height 1.638 m (5' 4.5\"), weight 78.5 kg (173 lb), last menstrual period 08/01/1995. Body mass index is 29.24 kg/m².    Hearing patient has hearing aides .    Dentition fair  Alert, oriented in no acute distress.  Eye contact is good, speech goal directed, affect calm    No visits with results within 1 Month(s) from this visit.   Latest known visit with results is:   Hospital Outpatient Visit on 04/19/2017   Component Date Value Ref Range Status   • Sodium 04/20/2017 140  135 - 145 mmol/L Final   • Potassium 04/20/2017 4.3  3.6 - 5.5 mmol/L Final   • Chloride 04/20/2017 104  96 - 112 mmol/L Final   • Glucose 04/20/2017 95  65 - 99 mg/dL Final   • Bun 04/20/2017 20  8 - 22 mg/dL Final   • Creatinine 04/20/2017 0.70  0.50 - 1.40 mg/dL Final   • Calcium 04/20/2017 10.1  8.5 - 10.5 mg/dL Final   • Co2 04/20/2017 25  20 - 33 mmol/L Final   • Anion Gap 04/20/2017 11.0  0.0 - 11.9 Final   • TSH 04/19/2017 1.990  0.300 - 3.700 uIU/mL Final   • Free T-4 04/19/2017 1.07  0.53 - 1.43 ng/dL Final   • Creatinine, Urine 04/19/2017 137.40  mg/dL Final   • Microalbumin, Urine Random 04/19/2017 1.4  mg/dL Final   • Micro Alb Creat Ratio 04/19/2017 10  0 - 30 mg/g Final   • Cholesterol,Tot 04/20/2017 158  100 - 199 mg/dL Final   • HDL 04/20/2017 60  >=40 mg/dL Final   • Triglycerides 04/20/2017 76  0 - 149 mg/dL Final   • LDL 04/20/2017 83  <100 mg/dL Final   • Glycohemoglobin 04/19/2017 5.7* 0.0 - 5.6 % Final    Comment: Increased risk for diabetes:  5.7 -6.4%  Diabetes:  >6.4%  Glycemic control for adults with diabetes:  <7.0%  The above interpretations are per ADA guidelines.  Diagnosis  of diabetes mellitus on the basis of elevated Hemoglobin A1c  should " be confirmed by repeating the Hb A1c test.     • Est Avg Glucose 04/19/2017 117  mg/dL Final    Comment: The eAG calculation is based on the A1c-Derived Daily Glucose  (ADAG) study.  See the ADA's website for additional information.     • GFR If  04/20/2017 >60  >60 mL/min/1.73 m 2 Final   • GFR If Non  04/20/2017 >60  >60 mL/min/1.73 m 2 Final           Assessment and Plan. The following treatment and monitoring plan is recommended:      1. Routine general medical examination at a health care facility  Reviewed depression screening, screening for cognitive impairment, timed up and go test, safety assessment and functional assessment ADLs    2. Need for 23-polyvalent pneumococcal polysaccharide vaccine  Vaccine given today   - Pneumococal Polysaccharide Vaccine 23-Valent =>3yo SQ/IM    3. Hyperlipidemia, unspecified hyperlipidemia type  Check labs, need LFTS  - COMP METABOLIC PANEL; Future  - LIPID PROFILE; Future    4. History of nasopharyngeal cancer  Patient is currently not being followed by oncology    5. Family history of melanoma  Followed by dermatology    6. Impaired fasting glucose  Check fasting glucose    7. Osteopenia, unspecified location  DEXA scan ordered  - DS-BONE DENSITY STUDY (DEXA); Future    8. Dysphagia, unspecified type  Stable    9. Hypothyroidism (acquired)  Followed by a specialist    10. S/P dilatation of esophageal stricture  Sees GI     11. S/P thyroidectomy  Followed by a specialist    12. Elevated hemoglobin A1c  Check fasting glucose    13. Hearing loss, unspecified hearing loss type, unspecified laterality  Patient declines audiology referral at this time      Services suggested: No services needed at this time  Health Care Screening recommendations as per orders if indicated.  Referrals offered: PT/OT/Nutrition counseling/Behavioral Health/Smoking cessation as per orders if indicated.    Discussion today about general wellness and lifestyle habits:     · Prevent falls and reduce trip hazards; Cautioned about securing or removing rugs.  · Have a working fire alarm and carbon monoxide detector;   · Engage in regular physical activity and social activities       Follow-up: No Follow-up on file.

## 2017-10-25 ENCOUNTER — HOSPITAL ENCOUNTER (OUTPATIENT)
Dept: LAB | Facility: MEDICAL CENTER | Age: 72
End: 2017-10-25
Attending: FAMILY MEDICINE
Payer: MEDICARE

## 2017-10-25 DIAGNOSIS — E78.5 HYPERLIPIDEMIA, UNSPECIFIED HYPERLIPIDEMIA TYPE: ICD-10-CM

## 2017-10-25 LAB
ALBUMIN SERPL BCP-MCNC: 4.1 G/DL (ref 3.2–4.9)
ALBUMIN/GLOB SERPL: 1.5 G/DL
ALP SERPL-CCNC: 64 U/L (ref 30–99)
ALT SERPL-CCNC: 14 U/L (ref 2–50)
ANION GAP SERPL CALC-SCNC: 5 MMOL/L (ref 0–11.9)
AST SERPL-CCNC: 18 U/L (ref 12–45)
BILIRUB CONJ SERPL-MCNC: 0.1 MG/DL (ref 0.1–0.5)
BILIRUB INDIRECT SERPL-MCNC: 0.4 MG/DL (ref 0–1)
BILIRUB SERPL-MCNC: 0.5 MG/DL (ref 0.1–1.5)
BUN SERPL-MCNC: 12 MG/DL (ref 8–22)
CALCIUM SERPL-MCNC: 9.6 MG/DL (ref 8.5–10.5)
CHLORIDE SERPL-SCNC: 105 MMOL/L (ref 96–112)
CHOLEST SERPL-MCNC: 178 MG/DL (ref 100–199)
CO2 SERPL-SCNC: 28 MMOL/L (ref 20–33)
CREAT SERPL-MCNC: 0.69 MG/DL (ref 0.5–1.4)
GFR SERPL CREATININE-BSD FRML MDRD: >60 ML/MIN/1.73 M 2
GLOBULIN SER CALC-MCNC: 2.7 G/DL (ref 1.9–3.5)
GLUCOSE SERPL-MCNC: 98 MG/DL (ref 65–99)
HDLC SERPL-MCNC: 62 MG/DL
LDLC SERPL CALC-MCNC: 104 MG/DL
POTASSIUM SERPL-SCNC: 4 MMOL/L (ref 3.6–5.5)
PROT SERPL-MCNC: 6.8 G/DL (ref 6–8.2)
SODIUM SERPL-SCNC: 138 MMOL/L (ref 135–145)
TRIGL SERPL-MCNC: 62 MG/DL (ref 0–149)

## 2017-10-25 PROCEDURE — 80061 LIPID PANEL: CPT

## 2017-10-25 PROCEDURE — 80053 COMPREHEN METABOLIC PANEL: CPT

## 2017-10-25 PROCEDURE — 82248 BILIRUBIN DIRECT: CPT

## 2017-10-25 PROCEDURE — 36415 COLL VENOUS BLD VENIPUNCTURE: CPT

## 2017-11-14 ENCOUNTER — HOSPITAL ENCOUNTER (OUTPATIENT)
Dept: RADIOLOGY | Facility: MEDICAL CENTER | Age: 72
End: 2017-11-14
Attending: FAMILY MEDICINE
Payer: MEDICARE

## 2017-11-14 DIAGNOSIS — M85.80 OSTEOPENIA, UNSPECIFIED LOCATION: ICD-10-CM

## 2017-11-14 PROCEDURE — 77080 DXA BONE DENSITY AXIAL: CPT

## 2017-12-04 ENCOUNTER — HOSPITAL ENCOUNTER (OUTPATIENT)
Dept: LAB | Facility: MEDICAL CENTER | Age: 72
End: 2017-12-04
Attending: INTERNAL MEDICINE
Payer: MEDICARE

## 2017-12-04 ENCOUNTER — TELEPHONE (OUTPATIENT)
Dept: ENDOCRINOLOGY | Facility: MEDICAL CENTER | Age: 72
End: 2017-12-04

## 2017-12-04 DIAGNOSIS — R73.03 PREDIABETES: ICD-10-CM

## 2017-12-04 DIAGNOSIS — E89.0 POSTSURGICAL HYPOTHYROIDISM: ICD-10-CM

## 2017-12-04 LAB
T4 FREE SERPL-MCNC: 1.06 NG/DL (ref 0.53–1.43)
TSH SERPL DL<=0.005 MIU/L-ACNC: 0.59 UIU/ML (ref 0.3–3.7)

## 2017-12-04 PROCEDURE — 36415 COLL VENOUS BLD VENIPUNCTURE: CPT

## 2017-12-04 PROCEDURE — 83036 HEMOGLOBIN GLYCOSYLATED A1C: CPT

## 2017-12-04 PROCEDURE — 84439 ASSAY OF FREE THYROXINE: CPT

## 2017-12-04 PROCEDURE — 84443 ASSAY THYROID STIM HORMONE: CPT

## 2017-12-05 LAB
EST. AVERAGE GLUCOSE BLD GHB EST-MCNC: 123 MG/DL
HBA1C MFR BLD: 5.9 % (ref 0–5.6)

## 2017-12-08 ENCOUNTER — NON-PROVIDER VISIT (OUTPATIENT)
Dept: MEDICAL GROUP | Facility: LAB | Age: 72
End: 2017-12-08
Payer: MEDICARE

## 2017-12-08 DIAGNOSIS — H61.20 WAX IN EAR: ICD-10-CM

## 2017-12-08 PROCEDURE — 99999 PR NO CHARGE: CPT | Performed by: FAMILY MEDICINE

## 2017-12-08 NOTE — PROGRESS NOTES
Yoselin Jorge is a 72 y.o. female here for a non-provider visit for ear lavage left ear impacted with wax    If abnormal was an in office provider notified today (if so, indicate provider)? No  Routed to PCP? Yes

## 2017-12-11 ENCOUNTER — OFFICE VISIT (OUTPATIENT)
Dept: ENDOCRINOLOGY | Facility: MEDICAL CENTER | Age: 72
End: 2017-12-11
Payer: MEDICARE

## 2017-12-11 VITALS
WEIGHT: 178.5 LBS | HEIGHT: 64 IN | DIASTOLIC BLOOD PRESSURE: 74 MMHG | SYSTOLIC BLOOD PRESSURE: 102 MMHG | BODY MASS INDEX: 30.48 KG/M2 | HEART RATE: 70 BPM

## 2017-12-11 DIAGNOSIS — M85.80 OSTEOPENIA, UNSPECIFIED LOCATION: ICD-10-CM

## 2017-12-11 DIAGNOSIS — R73.03 PREDIABETES: ICD-10-CM

## 2017-12-11 DIAGNOSIS — E78.2 MIXED HYPERLIPIDEMIA: ICD-10-CM

## 2017-12-11 DIAGNOSIS — E89.0 POSTSURGICAL HYPOTHYROIDISM: ICD-10-CM

## 2017-12-11 PROCEDURE — 99214 OFFICE O/P EST MOD 30 MIN: CPT | Performed by: INTERNAL MEDICINE

## 2017-12-11 NOTE — PROGRESS NOTES
"Endocrinology Clinic Progress Note    CC: Postsurgical hypothyroidism    HPI:  Yoselin Jorge is a 71 y.o. old patient who comes in today for routine follow up.     Postsurgical hypothyroidism: Currently she is on levothyroxine 112 µg daily except on Sundays she takes half a tablet. Reports compliance with medications. Energy levels are good.     Osteopenia: She received Reclast infusion in 2014 and 2015 as FRAX risk assessment showed ten-year probability of hip fracture greater than 3%. No history of fragility fractures. Bone density scan in November 2017 showed T score -0.8 at hip and -1.4 at spine, bone density showed 4% increase at hip and 0.5% decrease at spine compared to prior bone density scan in 2015.    At risk for diabetes: Recent A1c is 5.9%. She has gained about 10 pounds in the past year. She is not limiting carbohydrate intake.    ROS:  Constitutional: Positive for weight gain  Cardiac: No palpitations or racing heart    Past Medical History:  Patient Active Problem List    Diagnosis Date Noted   • Elevated hemoglobin A1c 05/11/2017   • S/P thyroidectomy 05/22/2015   • S/P dilatation of esophageal stricture 05/16/2015   • Swallowing difficulty 01/13/2015   • Hypothyroidism (acquired) 01/13/2015   • Osteopenia 03/30/2013   • Preventative health care 03/17/2013   • Impaired fasting glucose 03/17/2013   • Hearing loss 03/17/2013   • Hyperlipidemia 03/11/2013   • History of nasopharyngeal cancer 03/11/2013   • Family history of melanoma 03/11/2013     Physical Examination:  Vital signs: /74   Pulse 70   Ht 1.626 m (5' 4\")   Wt 81 kg (178 lb 8 oz)   LMP 08/01/1995   BMI 30.64 kg/m²   General: No apparent distress, cooperative  Eyes: No scleral icterus, no discharge  CVS: Regular rate and rhythm, S1 S2 normal, no murmur  Resp: Normal effort, clear to auscultation bilaterally  Psych: Alert and oriented, normal mood and affect    Assessment and Plan:    Postsurgical hypothyroidism  · Status " post total thyroidectomy in 2014 for multiple thyroid nodules, pathology was benign  · Recent TSH is 0.7  · Continue levothyroxine 112 µg daily except half a tablet on Sundays    Impaired fasting glucose  · Recent hemoglobin A1c is slightly elevated at 5.9%  · We discussed about diet and lifestyle modification    Mixed hyperlipidemia  ·   · Continue simvastatin    Osteopenia  · She received Reclast infusion in 2014 and 2015 as 10 year probability of hip fracture was more than 3%  · No history of fragility fractures  · Bone density scan in November 2017 showed T score -0.8 at hip and -1.4 at spine, bone density showed 4% increase at hip and 0.5% decrease at spine compared to prior bone density scan in 2015  · Advised to take supplemental vitamin D 2000 IUs daily and total daily intake of calcium 1200 mg    Return in about 1 year (around 12/11/2018).    Thank you for allowing me to participate in the care of this patient.    Britt Benjamin M.D.     This note was created using voice recognition software (Dragon). The accuracy of the dictation is limited by the abilities of the software. I have reviewed the note prior to signing, however some errors in grammar and context are still possible. If you have any questions related to this note please do not hesitate to contact our office.

## 2017-12-15 ENCOUNTER — NON-PROVIDER VISIT (OUTPATIENT)
Dept: MEDICAL GROUP | Facility: LAB | Age: 72
End: 2017-12-15
Payer: MEDICARE

## 2017-12-15 ENCOUNTER — APPOINTMENT (OUTPATIENT)
Dept: MEDICAL GROUP | Facility: LAB | Age: 72
End: 2017-12-15
Payer: MEDICARE

## 2017-12-15 DIAGNOSIS — H61.22 EXCESSIVE EAR WAX, LEFT: ICD-10-CM

## 2017-12-15 PROCEDURE — 99999 PR NO CHARGE: CPT | Performed by: INTERNAL MEDICINE

## 2017-12-15 NOTE — PROGRESS NOTES
Yoselin Pelaez Luisito is a 72 y.o. female here for a non-provider visit for ear lavage left ear    If abnormal was an in office provider notified today (if so, indicate provider)? No  Routed to PCP? Yes  Unable to get ear waxed out patient has scar tissue inside ear and wax wouldn't break free.

## 2017-12-27 DIAGNOSIS — E78.5 HYPERLIPIDEMIA, UNSPECIFIED HYPERLIPIDEMIA TYPE: ICD-10-CM

## 2017-12-27 RX ORDER — SIMVASTATIN 20 MG
20 TABLET ORAL EVERY EVENING
Qty: 90 TAB | Refills: 1 | Status: SHIPPED | OUTPATIENT
Start: 2017-12-27 | End: 2018-07-12 | Stop reason: SDUPTHER

## 2017-12-28 ENCOUNTER — OFFICE VISIT (OUTPATIENT)
Dept: MEDICAL GROUP | Facility: LAB | Age: 72
End: 2017-12-28
Payer: MEDICARE

## 2017-12-28 VITALS
BODY MASS INDEX: 29.19 KG/M2 | TEMPERATURE: 99 F | OXYGEN SATURATION: 97 % | DIASTOLIC BLOOD PRESSURE: 60 MMHG | SYSTOLIC BLOOD PRESSURE: 112 MMHG | HEART RATE: 74 BPM | RESPIRATION RATE: 12 BRPM | WEIGHT: 171 LBS | HEIGHT: 64 IN

## 2017-12-28 DIAGNOSIS — H61.22 EXCESSIVE CERUMEN IN LEFT EAR CANAL: ICD-10-CM

## 2017-12-28 PROCEDURE — 99213 OFFICE O/P EST LOW 20 MIN: CPT | Performed by: FAMILY MEDICINE

## 2017-12-28 NOTE — PROGRESS NOTES
Chief Complaint   Patient presents with   • Cerumen Impaction     left side       HISTORY OF PRESENT ILLNESS: Patient is a 72 y.o. female established patient who presents today to follow-up    Patient is here with above. Reports that she routinely gets impacted cerumen. She was seen in our office not too long ago and had her ears irrigated however this was not able to be resolved completely so patient is here today for reevaluation. Patient states she does have an ENT physician     Patient Active Problem List    Diagnosis Date Noted   • Elevated hemoglobin A1c 05/11/2017   • S/P thyroidectomy 05/22/2015   • S/P dilatation of esophageal stricture 05/16/2015   • Swallowing difficulty 01/13/2015   • Hypothyroidism (acquired) 01/13/2015   • Osteopenia 03/30/2013   • Preventative health care 03/17/2013   • Impaired fasting glucose 03/17/2013   • Hearing loss 03/17/2013   • Hyperlipidemia 03/11/2013   • History of nasopharyngeal cancer 03/11/2013   • Family history of melanoma 03/11/2013        Allergies:Patient has no known allergies.    Current Outpatient Prescriptions   Medication Sig Dispense Refill   • simvastatin (ZOCOR) 20 MG Tab Take 1 Tab by mouth every evening. 90 Tab 1   • levothyroxine (SYNTHROID) 112 MCG Tab TAKE 1 TAB BY MOUTH EVERYDAY. 90 Tab 1   • Probiotic Product (PROBIOTIC DAILY PO) Take  by mouth.     • Famotidine-Ca Carb-Mag Hydrox (ACID REDUCER + ANTACID PO) Take  by mouth.     • aspirin EC (ECOTRIN) 81 MG TBEC Take 81 mg by mouth every day. 1 tab 3x a week 30 Tab    • naproxen (ALEVE) 220 MG tablet  60 Tab    • vitamin D (CHOLECALCIFEROL) 1000 UNIT TABS Take 1,000 Units by mouth every day. 30 Tab 11   • Multiple Vitamins-Minerals (WOMENS BONE HEALTH PO) Take  by mouth.       No current facility-administered medications for this visit.        Social History   Substance Use Topics   • Smoking status: Never Smoker   • Smokeless tobacco: Never Used   • Alcohol use 0.6 oz/week     1 Glasses of wine per  "week      Comment: on rare ocassion       Social History     Social History Narrative    Retired Sales.    . 2 children.    Moved from [place] to [place] 2011.        Family History   Problem Relation Age of Onset   • Diabetes Father    • Lung Disease Mother 68     Emphazema   • Cancer Brother 55     liver cancer   • Cancer Brother 55     Prostate cancer   • Heart Disease Maternal Grandmother 63   • Cancer Brother      melanoma, liver cancer   • Cancer Brother      prostate cancer       ROS:      Exam:    Blood pressure 112/60, pulse 74, temperature 37.2 °C (99 °F), resp. rate 12, height 1.626 m (5' 4\"), weight 77.6 kg (171 lb), last menstrual period 08/01/1995, SpO2 97 %.    General:  Well nourished, well developed female in NAD    Physical Exam   Constitutional: Appears well-developed and well-nourished. Is active and cooperative.  Non-toxic appearance.   Head: Normocephalic and atraumatic.   Right Ear: External ear normal.   Left Ear: External ear normal. Patient has a small amount of cerumen present in her left ear canal  Eyes: Conjunctivae, EOM and lids are normal. No scleral icterus.   Skin: Skin is warm and dry. Patient is not diaphoretic.   Psychiatric: patient has a normal mood and affect; speech is normal and behavior is normal.      Assessment/Plan:    1. Excessive cerumen in left ear canal  MA attempted to irrigate the ear however this was unsuccessful. Patient is to contact her ENT physician, Dr. Manuel, for further evaluation. Patient is in agreement     Please note that this dictation was created using voice recognition software. I have made every reasonable attempt to correct obvious errors, but I expect that there are errors of grammar and possibly content that I did not discover before finalizing the note.    "

## 2018-05-10 ENCOUNTER — PATIENT OUTREACH (OUTPATIENT)
Dept: HEALTH INFORMATION MANAGEMENT | Facility: OTHER | Age: 73
End: 2018-05-10

## 2018-05-10 ENCOUNTER — TELEPHONE (OUTPATIENT)
Dept: HEALTH INFORMATION MANAGEMENT | Facility: OTHER | Age: 73
End: 2018-05-10

## 2018-05-10 NOTE — TELEPHONE ENCOUNTER
Cordelia Caro,    I just scheduled Yoselin Ackerman for her Wellness Visit. She wanted me to check with you if she needed any labs before coming in. I informed her the Wellness Visits do not require blood work but she wanted me to clarify with you.     Thank you,    Margaret DUNCAN   Medical Assistant

## 2018-05-10 NOTE — PROGRESS NOTES
1. Attempt #: 1    2. [facility] Verified: yes    3. Verify PCP: yes    4. Care Team Updated:       •   DME Company (gait device, O2, CPAP, etc.): YES       •   Other Specialists (eye doctor, derm, GYN, cardiology, endo, etc): YES    5.  Reviewed/Updated the following with patient:       •   Communication Preference Obtained? YES       •   Preferred Pharmacy? YES       •   Preferred Lab? YES       •   Family History (document living status of immediate family members and if + hx of cancer, diabetes, hypertension, hyperlipidemia, heart attack, stroke) YES. Was Abstract Encounter opened and chart updated? YES    6. cottonTracks Activation: already active    7. cottonTracks Isiah: no    8. Annual Wellness Visit Scheduling  Scheduling Status:Scheduled      9. Care Gap Scheduling (Attempt to Schedule EACH Overdue Care Gap!)     There are no preventive care reminders to display for this patient.     Scheduled patient for Annual Wellness Visit    10. Patient was advised: “This is a free wellness visit. The provider will screen for medical conditions to help you stay healthy. If you have other concerns to address you may be asked to discuss these at a separate visit or there may be an additional fee.”     11. Patient was informed to arrive 15 min prior to their scheduled appointment and bring in their medication bottles.

## 2018-05-16 ENCOUNTER — HOSPITAL ENCOUNTER (OUTPATIENT)
Dept: RADIOLOGY | Facility: MEDICAL CENTER | Age: 73
End: 2018-05-16
Attending: FAMILY MEDICINE
Payer: MEDICARE

## 2018-05-16 DIAGNOSIS — Z12.31 VISIT FOR SCREENING MAMMOGRAM: ICD-10-CM

## 2018-05-16 PROCEDURE — 77063 BREAST TOMOSYNTHESIS BI: CPT

## 2018-05-24 ENCOUNTER — TELEPHONE (OUTPATIENT)
Dept: MEDICAL GROUP | Facility: LAB | Age: 73
End: 2018-05-24

## 2018-05-24 NOTE — TELEPHONE ENCOUNTER
PVP WITH OUTREACH  Future Appointments       Provider Department Center    6/4/2018 9:00 AM Joselin Caro M.D.; [facility]  [facility]     7/31/2018 9:00 AM Kathy Soto M.D. [facility]          ANNUAL WELLNESS VISIT PRE-VISIT PLANNING     1.  Immunizations were updated in Epic using WebIZ?: Epic matches WebIZ       •  WebIZ Recommendations: Patient is up to date on all vaccines       •  Is patient due for Tdap? NO       •  Is patient due for Shingles? NO     2.  Specialty Comments was updated with diagnosis information provided by SCP: YES MDX printed

## 2018-06-04 ENCOUNTER — OFFICE VISIT (OUTPATIENT)
Dept: MEDICAL GROUP | Facility: LAB | Age: 73
End: 2018-06-04
Payer: MEDICARE

## 2018-06-04 VITALS
SYSTOLIC BLOOD PRESSURE: 102 MMHG | BODY MASS INDEX: 30.48 KG/M2 | OXYGEN SATURATION: 96 % | WEIGHT: 172 LBS | HEIGHT: 63 IN | DIASTOLIC BLOOD PRESSURE: 72 MMHG | RESPIRATION RATE: 12 BRPM | TEMPERATURE: 97.4 F | HEART RATE: 66 BPM

## 2018-06-04 DIAGNOSIS — E03.9 HYPOTHYROIDISM (ACQUIRED): ICD-10-CM

## 2018-06-04 DIAGNOSIS — Z98.890 S/P DILATATION OF ESOPHAGEAL STRICTURE: ICD-10-CM

## 2018-06-04 DIAGNOSIS — M85.80 OSTEOPENIA, UNSPECIFIED LOCATION: ICD-10-CM

## 2018-06-04 DIAGNOSIS — E89.0 S/P THYROIDECTOMY: ICD-10-CM

## 2018-06-04 DIAGNOSIS — E78.5 HYPERLIPIDEMIA, UNSPECIFIED HYPERLIPIDEMIA TYPE: ICD-10-CM

## 2018-06-04 DIAGNOSIS — Z00.00 ROUTINE GENERAL MEDICAL EXAMINATION AT A HEALTH CARE FACILITY: ICD-10-CM

## 2018-06-04 DIAGNOSIS — Z87.19 S/P DILATATION OF ESOPHAGEAL STRICTURE: ICD-10-CM

## 2018-06-04 DIAGNOSIS — R73.09 ELEVATED HEMOGLOBIN A1C: ICD-10-CM

## 2018-06-04 DIAGNOSIS — Z80.8 FAMILY HISTORY OF MELANOMA: ICD-10-CM

## 2018-06-04 DIAGNOSIS — R73.01 IMPAIRED FASTING GLUCOSE: ICD-10-CM

## 2018-06-04 DIAGNOSIS — Z85.819 HISTORY OF NASOPHARYNGEAL CANCER: ICD-10-CM

## 2018-06-04 DIAGNOSIS — R13.10 DYSPHAGIA, UNSPECIFIED TYPE: ICD-10-CM

## 2018-06-04 DIAGNOSIS — M62.838 MUSCLE SPASMS OF BOTH LOWER EXTREMITIES: ICD-10-CM

## 2018-06-04 DIAGNOSIS — H91.90 HEARING LOSS, UNSPECIFIED HEARING LOSS TYPE, UNSPECIFIED LATERALITY: ICD-10-CM

## 2018-06-04 PROCEDURE — G0439 PPPS, SUBSEQ VISIT: HCPCS | Performed by: FAMILY MEDICINE

## 2018-06-04 RX ORDER — TIZANIDINE 4 MG/1
4 TABLET ORAL EVERY 6 HOURS PRN
Qty: 30 TAB | Refills: 0 | Status: SHIPPED | OUTPATIENT
Start: 2018-06-04 | End: 2019-07-12 | Stop reason: SDUPTHER

## 2018-06-04 RX ORDER — AMITRIPTYLINE HYDROCHLORIDE 25 MG/1
25 TABLET, FILM COATED ORAL NIGHTLY PRN
Qty: 30 TAB | Refills: 0 | Status: SHIPPED | OUTPATIENT
Start: 2018-06-04 | End: 2019-07-12 | Stop reason: SDUPTHER

## 2018-06-04 RX ORDER — AMITRIPTYLINE HYDROCHLORIDE 25 MG/1
25 TABLET, FILM COATED ORAL NIGHTLY
COMMUNITY
End: 2018-06-04 | Stop reason: SDUPTHER

## 2018-06-04 RX ORDER — TIZANIDINE 4 MG/1
4 TABLET ORAL EVERY 6 HOURS PRN
COMMUNITY
End: 2018-06-04 | Stop reason: SDUPTHER

## 2018-06-04 ASSESSMENT — ENCOUNTER SYMPTOMS: GENERAL WELL-BEING: GOOD

## 2018-06-04 ASSESSMENT — ACTIVITIES OF DAILY LIVING (ADL): BATHING_REQUIRES_ASSISTANCE: 0

## 2018-06-04 ASSESSMENT — PATIENT HEALTH QUESTIONNAIRE - PHQ9: CLINICAL INTERPRETATION OF PHQ2 SCORE: 0

## 2018-06-04 NOTE — PROGRESS NOTES
Chief Complaint   Patient presents with   • Annual Wellness Visit         HPI:  Yoselin Ackerman is a 72 y.o. here for Medicare Annual Wellness Visit    She was seeing Dr Weinstein. She is having leg spasms at night and can be from the inner leg to the toes. She needs to make an appt with him   Started also on potassium     Patient Active Problem List    Diagnosis Date Noted   • Elevated hemoglobin A1c 05/11/2017   • S/P thyroidectomy 05/22/2015   • S/P dilatation of esophageal stricture 05/16/2015   • Swallowing difficulty  Same  01/13/2015   • Hypothyroidism (acquired) 01/13/2015   • Osteopenia 03/30/2013   • Preventative health care 03/17/2013   • Impaired fasting glucose 03/17/2013   • Hearing loss  Getting worse. Sees ENT tomorrow  03/17/2013   • Hyperlipidemia  She is taking her medication  03/11/2013   • History of nasopharyngeal cancer  Patient is under  03/11/2013   • Family history of melanoma 03/11/2013         Current Outpatient Prescriptions:   •  tizanidine (ZANAFLEX) 4 MG Tab, Take 4 mg by mouth every 6 hours as needed (1/2to 1 tab 3 times as needed)., Disp: , Rfl:   •  amitriptyline (ELAVIL) 25 MG Tab, Take 25 mg by mouth every evening., Disp: , Rfl:   •  levothyroxine (SYNTHROID) 112 MCG Tab, TAKE 1 TAB BY MOUTH EVERYDAY., Disp: 90 Tab, Rfl: 1  •  simvastatin (ZOCOR) 20 MG Tab, Take 1 Tab by mouth every evening., Disp: 90 Tab, Rfl: 1  •  Probiotic Product (PROBIOTIC DAILY PO), Take  by mouth., Disp: , Rfl:   •  Famotidine-Ca Carb-Mag Hydrox (ACID REDUCER + ANTACID PO), Take  by mouth., Disp: , Rfl:   •  aspirin EC (ECOTRIN) 81 MG TBEC, Take 81 mg by mouth every day. 1 tab 3x a week, Disp: 30 Tab, Rfl:   •  naproxen (ALEVE) 220 MG tablet, , Disp: 60 Tab, Rfl:   •  vitamin D (CHOLECALCIFEROL) 1000 UNIT TABS, Take 1,000 Units by mouth every day., Disp: 30 Tab, Rfl: 11  •  Multiple Vitamins-Minerals (OctonotcoS BONE HEALTH PO), Take  by mouth., Disp: , Rfl:        Patient is taking medications as noted in medication  list.  Current supplements as per medication list.     Allergies: Patient has no known allergies.    Current social contact/activities: reading, travel,playing slots machines     Is patient current with immunizations? Yes.    She  reports that she has never smoked. She has never used smokeless tobacco. She reports that she drinks about 0.6 oz of alcohol per week . She reports that she does not use drugs.  Counseling given: Yes        DPA/Advanced directive: Patient has Advanced Directive on file.     ROS:    Gait: Uses no assistive device   Ostomy: no   Other tubes: no   Amputations: no   Chronic oxygen use no   Last eye exam 6 months ago   Wears hearing aids: yes   : Reports urinary leakage during the last 6 months that has not interfered at all with their daily activities or sleep.      Depression Screening    Little interest or pleasure in doing things?  0 - not at all  Feeling down, depressed, or hopeless? 0 - not at all  Patient Health Questionnaire Score: 0    If depressive symptoms identified deferred to follow up visit unless specifically addressed in assessment and plan.    Interpretation of PHQ-9 Total Score   Score Severity   1-4 No Depression   5-9 Mild Depression   10-14 Moderate Depression   15-19 Moderately Severe Depression   20-27 Severe Depression    Screening for Cognitive Impairment    Three Minute Recall (leader, season, table)  2/3    Zach clock face with all 12 numbers and set the hands to show 10 past 11.  Yes 2/5  If cognitive concerns identified, deferred for follow up unless specifically addressed in assessment and plan.    Fall Risk Assessment    Has the patient had two or more falls in the last year or any fall with injury in the last year?  No  If fall risk identified, deferred for follow up unless specifically addressed in assessment and plan.    Safety Assessment    Throw rugs on floor.  No  Handrails on all stairs.  Yes  Good lighting in all hallways.  Yes  Difficulty hearing.   Yes  Patient counseled about all safety risks that were identified.    Functional Assessment ADLs    Are there any barriers preventing you from cooking for yourself or meeting nutritional needs?  No.    Are there any barriers preventing you from driving safely or obtaining transportation?  No.    Are there any barriers preventing you from using a telephone or calling for help?  No.    Are there any barriers preventing you from shopping?  No.    Are there any barriers preventing you from taking care of your own finances?  No.    Are there any barriers preventing you from managing your medications?  No.    Are there any barriers preventing you from showering, bathing or dressing yourself?  No.    Are you currently engaging in any exercise or physical activity?  Yes.  Walking every day.  What is your perception of your health?  Good.    Health Maintenance Summary                Annual Wellness Visit Next Due 10/25/2018      Done 10/24/2017      Patient has more history with this topic...    MAMMOGRAM Next Due 5/16/2019      Done 5/16/2018 MA-MAMMO SCREENING BILAT W/TOMOSYNTHESIS W/CAD     Patient has more history with this topic...    IMM DTaP/Tdap/Td Vaccine Next Due 10/27/2019      Done 10/27/2009 Imm Admin: Tdap Vaccine    BONE DENSITY Next Due 11/14/2019      Done 11/14/2017 DS-BONE DENSITY STUDY (DEXA)     Patient has more history with this topic...    COLONOSCOPY Next Due 1/5/2026      Done 1/5/2016 AMB REFERRAL TO GI FOR COLONOSCOPY          Patient Care Team:  Joselin Caro M.D. as PCP - General (Family Medicine)  KAREEM Guido as Mid Level Provider (Dermatology)  Slick Pickett O.D. as Consulting Physician (Optometry)  Raimundo Love DDS as Consulting Physician (Dentistry)  Britt Benjamin M.D. as Consulting Physician (Endocrinology)  Jorge L Weinstein as Consulting Physician (Orthopaedics)  Mitchel Manuel M.D. as Consulting Physician (Otolaryngology)    Social History   Substance Use Topics   •  Smoking status: Never Smoker   • Smokeless tobacco: Never Used   • Alcohol use 0.6 oz/week     1 Glasses of wine per week      Comment: on rare ocassion     Family History   Problem Relation Age of Onset   • Diabetes Father    • Lung Disease Mother 68     Emphazema   • Cancer Brother 55     liver cancer   • Cancer Brother 55     Prostate cancer   • Heart Disease Maternal Grandmother 63   • Cancer Brother      melanoma, liver cancer   • Cancer Brother      prostate cancer     She  has a past medical history of Family history of melanoma (3/11/2013); Flat foot(734) (3/17/2013); Heart burn; Herniated cervical disc; History of nasopharyngeal cancer (3/11/2013); Hyperlipidemia (3/11/2013); Impaired fasting glucose (3/17/2013); Indigestion; Other lymphedema (3/11/2013); S/P dilatation of esophageal stricture (5/16/2015); S/P thyroidectomy (5/22/2015); SENSORINEURAL HEARING LOSS (3/17/2013); Shoulder pain (2014); Unspecified cataract; and Unspecified urinary incontinence.     Past Surgical History:   Procedure Laterality Date   • THYROIDECTOMY TOTAL  11/19/2014    Performed by Lukas De Santiago M.D. at SURGERY SAME DAY [facility]   • BICEPS TENODESIS  5/22/2014    Performed by Cain Gerardo M.D. at [facility]   • SHOULDER ARTHROSCOPY W/ ROTATOR CUFF REPAIR  5/22/2014    Performed by Cain Gerardo M.D. at [facility]   • SHOULDER DECOMPRESSION ARTHROSCOPIC  5/22/2014    Performed by Cain Gerardo M.D. at [facility]   • CLAVICLE DISTAL EXCISION  5/22/2014    Performed by Cain Gerardo M.D. at [facility]   • TENDON RELEASE FOOT  August 2013    jocelyn    • OTHER ORTHOPEDIC SURGERY  2006    reconstruction  left foot   • CHOLECYSTECTOMY  1988   • BUNIONECTOMY  1988   • TUBAL LIGATION  1982   • CATARACT PHACO WITH IOL      Ry   • GYN SURGERY      tubal   • TONSILLECTOMY     • US-NEEDLE CORE BX-BREAST PANEL             Exam:     Blood pressure 102/72, pulse 66,  "temperature 36.3 °C (97.4 °F), resp. rate 12, height 1.6 m (5' 3\"), weight 78 kg (172 lb), last menstrual period 08/01/1995, SpO2 96 %. Body mass index is 30.47 kg/m².    Hearing fair.    Dentition good  Alert, oriented in no acute distress.  Eye contact is good, speech goal directed, affect calm      Due for labs in October and would like to check labs now     Assessment and Plan. The following treatment and monitoring plan is recommended:      1. Routine general medical examination at a health care facility  Reviewed depression screening, screening for cognitive impairment, timed up and go test, safety assessment and functional assessment ADLs    2. Elevated hemoglobin A1c  Check labs     3. S/P thyroidectomy  Stable     4. S/P dilatation of esophageal stricture  Stable     5. Dysphagia, unspecified type  Stable     6. Hypothyroidism (acquired)  Check labs. Continue medication   - TSH; Future  - FREE THYROXINE; Future    7. Osteopenia, unspecified location  dexa up to date     8. Impaired fasting glucose  Check labs   - COMP METABOLIC PANEL; Future  - HEMOGLOBIN A1C; Future    9. Hearing loss, unspecified hearing loss type, unspecified laterality  Sees ENT - appt tomorrow     10. Hyperlipidemia, unspecified hyperlipidemia type  Check labs. Continue meds   - LIPID PROFILE; Future  - COMP METABOLIC PANEL; Future    11. History of nasopharyngeal cancer  Sees specialist     12. Family history of melanoma  Sees specialist     13. Muscle spasms of both lower extremities  She is to call Dr Weinstein for follow up         Services suggested: No services needed at this time  Health Care Screening recommendations as per orders if indicated.  Referrals offered: PT/OT/Nutrition counseling/Behavioral Health/Smoking cessation as per orders if indicated.    Discussion today about general wellness and lifestyle habits:    · Prevent falls and reduce trip hazards; Cautioned about securing or removing rugs.  · Have a working fire " alarm and carbon monoxide detector;   · Engage in regular physical activity and social activities       Follow-up: No Follow-up on file.

## 2018-06-05 ENCOUNTER — HOSPITAL ENCOUNTER (OUTPATIENT)
Dept: LAB | Facility: MEDICAL CENTER | Age: 73
End: 2018-06-05
Attending: FAMILY MEDICINE
Payer: MEDICARE

## 2018-06-05 DIAGNOSIS — R73.01 IMPAIRED FASTING GLUCOSE: ICD-10-CM

## 2018-06-05 DIAGNOSIS — E78.5 HYPERLIPIDEMIA, UNSPECIFIED HYPERLIPIDEMIA TYPE: ICD-10-CM

## 2018-06-05 DIAGNOSIS — E03.9 HYPOTHYROIDISM (ACQUIRED): ICD-10-CM

## 2018-06-05 LAB
ALBUMIN SERPL BCP-MCNC: 4.2 G/DL (ref 3.2–4.9)
ALBUMIN/GLOB SERPL: 1.6 G/DL
ALP SERPL-CCNC: 58 U/L (ref 30–99)
ALT SERPL-CCNC: 17 U/L (ref 2–50)
ANION GAP SERPL CALC-SCNC: 10 MMOL/L (ref 0–11.9)
AST SERPL-CCNC: 19 U/L (ref 12–45)
BILIRUB SERPL-MCNC: 0.5 MG/DL (ref 0.1–1.5)
BUN SERPL-MCNC: 12 MG/DL (ref 8–22)
CALCIUM SERPL-MCNC: 9.7 MG/DL (ref 8.5–10.5)
CHLORIDE SERPL-SCNC: 104 MMOL/L (ref 96–112)
CHOLEST SERPL-MCNC: 179 MG/DL (ref 100–199)
CO2 SERPL-SCNC: 27 MMOL/L (ref 20–33)
CREAT SERPL-MCNC: 0.8 MG/DL (ref 0.5–1.4)
EST. AVERAGE GLUCOSE BLD GHB EST-MCNC: 120 MG/DL
GLOBULIN SER CALC-MCNC: 2.7 G/DL (ref 1.9–3.5)
GLUCOSE SERPL-MCNC: 101 MG/DL (ref 65–99)
HBA1C MFR BLD: 5.8 % (ref 0–5.6)
HDLC SERPL-MCNC: 69 MG/DL
LDLC SERPL CALC-MCNC: 95 MG/DL
POTASSIUM SERPL-SCNC: 4.2 MMOL/L (ref 3.6–5.5)
PROT SERPL-MCNC: 6.9 G/DL (ref 6–8.2)
SODIUM SERPL-SCNC: 141 MMOL/L (ref 135–145)
T4 FREE SERPL-MCNC: 0.91 NG/DL (ref 0.53–1.43)
TRIGL SERPL-MCNC: 77 MG/DL (ref 0–149)
TSH SERPL DL<=0.005 MIU/L-ACNC: 1.3 UIU/ML (ref 0.38–5.33)

## 2018-06-05 PROCEDURE — 80053 COMPREHEN METABOLIC PANEL: CPT

## 2018-06-05 PROCEDURE — 80061 LIPID PANEL: CPT

## 2018-06-05 PROCEDURE — 84439 ASSAY OF FREE THYROXINE: CPT

## 2018-06-05 PROCEDURE — 83036 HEMOGLOBIN GLYCOSYLATED A1C: CPT

## 2018-06-05 PROCEDURE — 84443 ASSAY THYROID STIM HORMONE: CPT

## 2018-06-05 PROCEDURE — 36415 COLL VENOUS BLD VENIPUNCTURE: CPT

## 2018-07-12 DIAGNOSIS — E78.5 HYPERLIPIDEMIA, UNSPECIFIED HYPERLIPIDEMIA TYPE: ICD-10-CM

## 2018-07-12 RX ORDER — SIMVASTATIN 20 MG
TABLET ORAL
Qty: 90 TAB | Refills: 0 | Status: SHIPPED | OUTPATIENT
Start: 2018-07-12 | End: 2018-09-21 | Stop reason: SDUPTHER

## 2018-07-12 NOTE — TELEPHONE ENCOUNTER
Was the patient seen in the last year in this department? Yes     Does patient have an active prescription for medications requested? No     Received Request Via: Pharmacy     Last visit:6/4/18

## 2018-07-31 ENCOUNTER — HOSPITAL ENCOUNTER (OUTPATIENT)
Dept: RADIOLOGY | Facility: MEDICAL CENTER | Age: 73
End: 2018-07-31
Attending: FAMILY MEDICINE
Payer: MEDICARE

## 2018-07-31 ENCOUNTER — OFFICE VISIT (OUTPATIENT)
Dept: MEDICAL GROUP | Facility: MEDICAL CENTER | Age: 73
End: 2018-07-31
Payer: MEDICARE

## 2018-07-31 VITALS
HEART RATE: 60 BPM | HEIGHT: 63 IN | BODY MASS INDEX: 30.87 KG/M2 | DIASTOLIC BLOOD PRESSURE: 50 MMHG | SYSTOLIC BLOOD PRESSURE: 82 MMHG | WEIGHT: 174.2 LBS | OXYGEN SATURATION: 95 % | TEMPERATURE: 97.7 F

## 2018-07-31 DIAGNOSIS — E03.9 HYPOTHYROIDISM (ACQUIRED): ICD-10-CM

## 2018-07-31 DIAGNOSIS — R07.81 RIB PAIN ON RIGHT SIDE: ICD-10-CM

## 2018-07-31 DIAGNOSIS — E66.9 OBESITY (BMI 30-39.9): ICD-10-CM

## 2018-07-31 DIAGNOSIS — E78.00 PURE HYPERCHOLESTEROLEMIA: ICD-10-CM

## 2018-07-31 DIAGNOSIS — Z85.819 HISTORY OF NASOPHARYNGEAL CANCER: ICD-10-CM

## 2018-07-31 DIAGNOSIS — H91.90 HEARING LOSS, UNSPECIFIED HEARING LOSS TYPE, UNSPECIFIED LATERALITY: ICD-10-CM

## 2018-07-31 DIAGNOSIS — R73.03 PREDIABETES: ICD-10-CM

## 2018-07-31 PROCEDURE — 99214 OFFICE O/P EST MOD 30 MIN: CPT | Performed by: FAMILY MEDICINE

## 2018-07-31 PROCEDURE — 71111 X-RAY EXAM RIBS/CHEST4/> VWS: CPT

## 2018-07-31 RX ORDER — MELOXICAM 15 MG/1
15 TABLET ORAL DAILY
Status: ON HOLD | COMMUNITY
End: 2019-12-17

## 2018-07-31 NOTE — ASSESSMENT & PLAN NOTE
This is a chronic health problem for this patient that she had blood work done back in June which came back showing her A1c was elevated at 5.8 and her fasting glucose was 101.  Patient is working on dietary changes to try and get this under control.  We will plan to recheck things some time close to the end of September and then see her back in the office.

## 2018-07-31 NOTE — ASSESSMENT & PLAN NOTE
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  She was 9 months with feeding tube.  She mostly is left with a dry mouth and loss of hearing on the left and some numbness from her surgeries she also has swallowing issues.  Patient wanting to be certain that I am aware of this history.

## 2018-07-31 NOTE — ASSESSMENT & PLAN NOTE
This is a chronic health condition for which she had lab work done in June.  Her lipid levels were all at appropriate levels.  She continues on simvastatin 20 mg daily.  There is no liver dysfunction.  We will check this every 6 months.

## 2018-07-31 NOTE — PROGRESS NOTES
CC:  Diagnoses of Rib pain on right side, Prediabetes, History of nasopharyngeal cancer, Hearing loss, unspecified hearing loss type, unspecified laterality, Pure hypercholesterolemia, Hypothyroidism (acquired), and Obesity (BMI 30-39.9) were pertinent to this visit.    HISTORY OF THE PRESENT ILLNESS: Patient is a 73 y.o. female. This pleasant patient is here today to establish care previously having been a patient of Dr. Joselin Caro.  She is establishing in my practice today and comes with 1 acute problems as well as a few chronic problems.  She did do blood work back in June and would like to review those results.    Health Maintenance: Completed      Rib pain on right side  This is a acute problem that started about 4 weeks ago.  Patient is noted over the last 5 days that the pain in the right rib area is more sharp.  She is painful to palpation over T10 in the mid axillary line.  There is no bruising there is been no trauma.  Patient cannot recall anything that would explain the pain.  She states initially the pain was more dull now in the last 5 days it has become sharp.  It does seem to be worse with certain movements.  She is found nothing to relieve the pain.  She did try Aleve yesterday and found no relief with the medication.    Prediabetes  This is a chronic health problem for this patient that she had blood work done back in June which came back showing her A1c was elevated at 5.8 and her fasting glucose was 101.  Patient is working on dietary changes to try and get this under control.  We will plan to recheck things some time close to the end of September and then see her back in the office.    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  She was 9 months with feeding tube.  She mostly is left with a dry mouth and loss of hearing on the left and some numbness from her surgeries she also has swallowing issues.  Patient wanting to  be certain that I am aware of this history.    Hearing loss  Patient has age-related hearing loss on the right and then on the left she had chemo as well as radiation for an oral pharyngeal cancer so she is very limited hearing on the left.  She does wear bilateral hearing aids.    Hyperlipidemia  This is a chronic health condition for which she had lab work done in June.  Her lipid levels were all at appropriate levels.  She continues on simvastatin 20 mg daily.  There is no liver dysfunction.  We will check this every 6 months.    Hypothyroidism (acquired)  This is a chronic health problem for this patient after having a complete thyroidectomy due to thyroid nodules.  She is on thyroid replacement with levothyroxine at 112 mcg daily.  Her TSH just done in June was totally normal.  We will continue with that dose for the coming year.    Obesity (BMI 30-39.9)  This is a chronic health problem for this patient where at the time she is diagnosed with cancer she weighed 220 pounds.  She had to be on tube feeds for up to 9 months.  She got down to 145 her most comfortable weight is 155.  She is about 20 pounds over that.  She is working on this.  We will continue to follow with her.    Allergies: Patient has no known allergies.    Current Outpatient Prescriptions Ordered in [facility]   Medication Sig Dispense Refill   • Potassium 99 MG Tab Take  by mouth.     • meloxicam (MOBIC) 15 MG tablet Take 15 mg by mouth every day.     • simvastatin (ZOCOR) 20 MG Tab TAKE ONE TABLET BY MOUTH IN THE EVENING  90 Tab 0   • amitriptyline (ELAVIL) 25 MG Tab Take 1 Tab by mouth at bedtime as needed. 30 Tab 0   • levothyroxine (SYNTHROID) 112 MCG Tab TAKE 1 TAB BY MOUTH EVERYDAY. 90 Tab 1   • Probiotic Product (PROBIOTIC DAILY PO) Take  by mouth.     • Famotidine-Ca Carb-Mag Hydrox (ACID REDUCER + ANTACID PO) Take  by mouth.     • aspirin EC (ECOTRIN) 81 MG TBEC Take 81 mg by mouth every day. 1 tab 3x a week 30 Tab    • naproxen (ALEVE) 220  MG tablet  60 Tab    • vitamin D (CHOLECALCIFEROL) 1000 UNIT TABS Take 1,000 Units by mouth every day. 30 Tab 11   • Multiple Vitamins-Minerals (WOMENS BONE HEALTH PO) Take  by mouth.     • tizanidine (ZANAFLEX) 4 MG Tab Take 1 Tab by mouth every 6 hours as needed (1/2to 1 tab 3 times as needed). 30 Tab 0     No current Epic-ordered facility-administered medications on file.        Past Medical History:   Diagnosis Date   • Family history of melanoma 3/11/2013   • Flat foot(734) 3/17/2013   • Heart burn    • Herniated cervical disc     L4- L5 right side-Dr. Weinstein   • History of nasopharyngeal cancer 3/11/2013   • Hyperlipidemia 3/11/2013   • Impaired fasting glucose 3/17/2013   • Indigestion    • Other lymphedema 3/11/2013   • Prediabetes 5/11/2017   • Rib pain on right side 7/31/2018   • S/P dilatation of esophageal stricture 5/16/2015    EGD 2015.   • S/P thyroidectomy 5/22/2015   • SENSORINEURAL HEARING LOSS 3/17/2013   • Shoulder pain 2014   • Unspecified cataract     bilater. IOL   • Unspecified urinary incontinence        Past Surgical History:   Procedure Laterality Date   • THYROIDECTOMY TOTAL  11/19/2014    Performed by Lukas De Santiago M.D. at SURGERY SAME DAY [facility]   • BICEPS TENODESIS  5/22/2014    Performed by Cain Gerardo M.D. at SURGERY [facility]   • SHOULDER ARTHROSCOPY W/ ROTATOR CUFF REPAIR  5/22/2014    Performed by Cain Gerardo M.D. at SURGERY [facility]   • SHOULDER DECOMPRESSION ARTHROSCOPIC  5/22/2014    Performed by Cain Gerardo M.D. at SURGERY [facility]   • CLAVICLE DISTAL EXCISION  5/22/2014    Performed by Cain Gerardo M.D. at SURGERY [facility]   • TENDON RELEASE FOOT  August 2013    jocelyn    • OTHER ORTHOPEDIC SURGERY  2006    reconstruction  left foot   • CHOLECYSTECTOMY  1988   • BUNIONECTOMY  1988   • TUBAL LIGATION  1982   • CATARACT PHACO WITH IOL      Ry   • GYN SURGERY      tubal   • TONSILLECTOMY     • US-NEEDLE CORE BX-BREAST PANEL          Social History   Substance Use Topics   • Smoking status: Never Smoker   • Smokeless tobacco: Never Used   • Alcohol use 0.6 oz/week     1 Glasses of wine per week      Comment: on rare ocassion       Social History     Social History Narrative    Retired Sales.    . 2 children.    Moved from [place] to [place] 2011.        Family History   Problem Relation Age of Onset   • Diabetes Father    • Hypertension Father    • Hyperlipidemia Father    • Stroke Father    • Lung Disease Mother 68        Emphazema   • Heart Disease Mother         CHF   • Cancer Brother 55        liver cancer   • Other Brother         Melanoma   • Cancer Brother 55        Prostate cancer   • Heart Disease Maternal Grandmother 63       ROS:     - Constitutional: Negative for fever, chills, unexpected weight change, and fatigue/generalized weakness.     - HEENT: Negative for headaches, vision changes, hearing changes, ear pain, ear discharge, rhinorrhea, sinus congestion, sore throat, and neck pain.      - Respiratory: Negative for cough, sputum production, chest congestion, dyspnea, wheezing, and crackles.      - Cardiovascular: Negative for chest pain, palpitations, orthopnea, and bilateral lower extremity edema.     - Gastrointestinal: Right flank pain of unclear etiology directly under the last rib on the right in the mid axillary line.  Denies heartburn, nausea, vomiting, abdominal pain, hematochezia, melena, diarrhea, constipation, and greasy/foul-smelling stools.     - Genitourinary: Negative for dysuria, polyuria, hematuria, pyuria, urinary urgency, and urinary incontinence.     - Musculoskeletal: Negative for myalgias, back pain, and joint pain.     - Skin: Negative for rash, itching, cyanotic skin color change.     - Neurological: Negative for dizziness, tingling, tremors, focal sensory deficit, focal weakness and headaches.     - Endo/Heme/Allergies: Does not bruise/bleed easily.     - Psychiatric/Behavioral: Negative for  "depression, suicidal/homicidal ideation and memory loss.        - NOTE: All other systems reviewed and are negative, except as in HPI.      .      Exam: Blood pressure (!) 82/50, pulse 60, temperature 36.5 °C (97.7 °F), height 1.6 m (5' 3\"), weight 79 kg (174 lb 3.2 oz), last menstrual period 08/01/1995, SpO2 95 %. Body mass index is 30.86 kg/m².    General: Normal appearing. No distress.  HEENT: Normocephalic. Eyes conjunctiva clear lids without ptosis, pupils equal and reactive to light accommodation, ears normal shape and contour, canals are clear bilaterally, tympanic membranes are benign, nasal mucosa benign, oropharynx is without erythema, edema or exudates.   Neck: Supple without JVD or bruit. Thyroid is not enlarged.  Pulmonary: Clear to ausculation.  Normal effort. No rales, ronchi, or wheezing.  Cardiovascular: Regular rate and rhythm without murmur. Carotid and radial pulses are intact and equal bilaterally.  Abdomen: Soft, nontender, nondistended. Normal bowel sounds. Liver and spleen are not palpable.  There is tenderness with palpation of the right 10th rib in the mid axillary line line and the right flank.  No masses noted.  Neurologic: Grossly nonfocal  Lymph: No cervical, supraclavicular or axillary lymph nodes are palpable  Skin: Warm and dry.  No obvious lesions.  Musculoskeletal: Normal gait. No extremity cyanosis, clubbing, or edema.  Psych: Normal mood and affect. Alert and oriented x3. Judgment and insight is normal.    Please note that this dictation was created using voice recognition software. I have made every reasonable attempt to correct obvious errors, but I expect that there are errors of grammar and possibly content that I did not discover before finalizing the note.      Assessment/Plan  1. Rib pain on right side  Uncontrolled, patient's rib detail films come back negative.  We will get a CT scan of the abdomen with and without contrast.  Have also ordered a comprehensive metabolic " panel to be done prior to the CT scan.  We will see her back to go over those results as soon as we have them.  - [id number] (WITH 1-VIEW CXR); Future  - COMP METABOLIC PANEL; Future  - CT-ABDOMEN WITH & W/O; Future    2. Prediabetes  This is controlled with the patient's lifestyle and she is working on weight loss.    3. History of nasopharyngeal cancer  This is currently stable.  Patient is 11 years out from her diagnosis and remains cancer free.    4. Hearing loss, unspecified hearing loss type, unspecified laterality  This is a stable problem not worsening.    5. Pure hypercholesterolemia  Controlled, continue with current meds and lifestyle.      6. Hypothyroidism (acquired)  Controlled, continue with current meds and lifestyle.      7. Obesity (BMI 30-39.9)  Uncontrolled but patient working on weight loss and thinks that she can lose the 20 pounds she needs to lose between now and the beginning of the year.  - Patient identified as having weight management issue.  Appropriate orders and counseling given.

## 2018-07-31 NOTE — ASSESSMENT & PLAN NOTE
This is a chronic health problem for this patient after having a complete thyroidectomy due to thyroid nodules.  She is on thyroid replacement with levothyroxine at 112 mcg daily.  Her TSH just done in June was totally normal.  We will continue with that dose for the coming year.

## 2018-07-31 NOTE — ASSESSMENT & PLAN NOTE
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  She does wear bilateral hearing aids.

## 2018-07-31 NOTE — ASSESSMENT & PLAN NOTE
This is a chronic health problem for this patient where at the time she is diagnosed with cancer she weighed 220 pounds.  She had to be on tube feeds for up to 9 months.  She got down to 145 her most comfortable weight is 155.  She is about 20 pounds over that.  She is working on this.  We will continue to follow with her.

## 2018-07-31 NOTE — ASSESSMENT & PLAN NOTE
This is a acute problem that started about 4 weeks ago.  Patient is noted over the last 5 days that the pain in the right rib area is more sharp.  She is painful to palpation over T10 in the mid axillary line.  There is no bruising there is been no trauma.  Patient cannot recall anything that would explain the pain.  She states initially the pain was more dull now in the last 5 days it has become sharp.  It does seem to be worse with certain movements.  She is found nothing to relieve the pain.  She did try Aleve yesterday and found no relief with the medication.

## 2018-08-02 ENCOUNTER — HOSPITAL ENCOUNTER (OUTPATIENT)
Dept: LAB | Facility: MEDICAL CENTER | Age: 73
End: 2018-08-02
Attending: FAMILY MEDICINE
Payer: MEDICARE

## 2018-08-02 DIAGNOSIS — R07.81 RIB PAIN ON RIGHT SIDE: ICD-10-CM

## 2018-08-02 LAB
ALBUMIN SERPL BCP-MCNC: 4.3 G/DL (ref 3.2–4.9)
ALBUMIN/GLOB SERPL: 2 G/DL
ALP SERPL-CCNC: 58 U/L (ref 30–99)
ALT SERPL-CCNC: 17 U/L (ref 2–50)
ANION GAP SERPL CALC-SCNC: 7 MMOL/L (ref 0–11.9)
AST SERPL-CCNC: 19 U/L (ref 12–45)
BILIRUB SERPL-MCNC: 0.5 MG/DL (ref 0.1–1.5)
BUN SERPL-MCNC: 13 MG/DL (ref 8–22)
CALCIUM SERPL-MCNC: 9.2 MG/DL (ref 8.5–10.5)
CHLORIDE SERPL-SCNC: 106 MMOL/L (ref 96–112)
CO2 SERPL-SCNC: 27 MMOL/L (ref 20–33)
CREAT SERPL-MCNC: 0.73 MG/DL (ref 0.5–1.4)
GLOBULIN SER CALC-MCNC: 2.2 G/DL (ref 1.9–3.5)
GLUCOSE SERPL-MCNC: 85 MG/DL (ref 65–99)
POTASSIUM SERPL-SCNC: 4.3 MMOL/L (ref 3.6–5.5)
PROT SERPL-MCNC: 6.5 G/DL (ref 6–8.2)
SODIUM SERPL-SCNC: 140 MMOL/L (ref 135–145)

## 2018-08-02 PROCEDURE — 80053 COMPREHEN METABOLIC PANEL: CPT

## 2018-08-02 PROCEDURE — 36415 COLL VENOUS BLD VENIPUNCTURE: CPT

## 2018-08-06 ENCOUNTER — TELEPHONE (OUTPATIENT)
Dept: MEDICAL GROUP | Facility: MEDICAL CENTER | Age: 73
End: 2018-08-06

## 2018-08-06 NOTE — TELEPHONE ENCOUNTER
1. Caller Name: [facility] ([facility] called re: Yoselin Pelaez [place]      Call Back Number: [phone]      Patient approves a detailed voicemail message: yes    Imaging called asking what you are looking for in the CT imaging done for this patients in the abdominal area.

## 2018-08-07 NOTE — TELEPHONE ENCOUNTER
Wondering if there is a mass in the RUQ of the abdomen or something in the pancreas to explain her pain.

## 2018-08-08 ENCOUNTER — HOSPITAL ENCOUNTER (OUTPATIENT)
Dept: RADIOLOGY | Facility: MEDICAL CENTER | Age: 73
End: 2018-08-08
Attending: FAMILY MEDICINE
Payer: MEDICARE

## 2018-08-08 DIAGNOSIS — R10.11 RIGHT UPPER QUADRANT PAIN: ICD-10-CM

## 2018-08-08 DIAGNOSIS — R07.81 RIB PAIN ON RIGHT SIDE: ICD-10-CM

## 2018-08-08 PROCEDURE — 74160 CT ABDOMEN W/CONTRAST: CPT

## 2018-08-08 PROCEDURE — 700117 HCHG RX CONTRAST REV CODE 255: Performed by: FAMILY MEDICINE

## 2018-08-08 RX ADMIN — IOHEXOL 100 ML: 350 INJECTION, SOLUTION INTRAVENOUS at 15:15

## 2018-08-08 RX ADMIN — IOHEXOL 50 ML: 240 INJECTION, SOLUTION INTRATHECAL; INTRAVASCULAR; INTRAVENOUS; ORAL at 15:15

## 2018-09-21 ENCOUNTER — OFFICE VISIT (OUTPATIENT)
Dept: MEDICAL GROUP | Facility: MEDICAL CENTER | Age: 73
End: 2018-09-21
Payer: MEDICARE

## 2018-09-21 VITALS
TEMPERATURE: 98 F | HEIGHT: 63 IN | WEIGHT: 174.8 LBS | DIASTOLIC BLOOD PRESSURE: 58 MMHG | SYSTOLIC BLOOD PRESSURE: 110 MMHG | HEART RATE: 70 BPM | OXYGEN SATURATION: 96 % | BODY MASS INDEX: 30.97 KG/M2

## 2018-09-21 DIAGNOSIS — E66.9 OBESITY (BMI 30-39.9): ICD-10-CM

## 2018-09-21 DIAGNOSIS — E78.5 HYPERLIPIDEMIA, UNSPECIFIED HYPERLIPIDEMIA TYPE: ICD-10-CM

## 2018-09-21 DIAGNOSIS — R13.10 DYSPHAGIA, UNSPECIFIED TYPE: ICD-10-CM

## 2018-09-21 DIAGNOSIS — R73.03 PREDIABETES: ICD-10-CM

## 2018-09-21 DIAGNOSIS — Z23 NEED FOR VACCINATION: ICD-10-CM

## 2018-09-21 PROCEDURE — 90662 IIV NO PRSV INCREASED AG IM: CPT | Performed by: FAMILY MEDICINE

## 2018-09-21 PROCEDURE — 99213 OFFICE O/P EST LOW 20 MIN: CPT | Mod: 25 | Performed by: FAMILY MEDICINE

## 2018-09-21 PROCEDURE — G0008 ADMIN INFLUENZA VIRUS VAC: HCPCS | Performed by: FAMILY MEDICINE

## 2018-09-21 RX ORDER — LEVOTHYROXINE SODIUM 112 UG/1
112 TABLET ORAL
Qty: 90 TAB | Refills: 3 | Status: SHIPPED | OUTPATIENT
Start: 2018-09-21 | End: 2019-07-19 | Stop reason: SDUPTHER

## 2018-09-21 RX ORDER — SIMVASTATIN 20 MG
20 TABLET ORAL EVERY EVENING
Qty: 90 TAB | Refills: 3 | Status: SHIPPED | OUTPATIENT
Start: 2018-09-21 | End: 2019-07-19 | Stop reason: SDUPTHER

## 2018-09-21 NOTE — PROGRESS NOTES
CC:Diagnoses of Prediabetes, Dysphagia, unspecified type, Obesity (BMI 30-39.9), Hyperlipidemia, unspecified hyperlipidemia type, and Need for vaccination were pertinent to this visit.    HISTORY OF PRESENT ILLNESS: Patient is a 73 y.o. female established patient who presents today to follow-up on her chronic health problems as outlined below.  Patient informs me that the right upper quadrant abdominal pain that she was having has now completely resolved.  She had a negative workup with a negative CT scan of the abdomen as well as a negative rib series.  I am unable to explain what she had going on.  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  We did not see any signs of this.    Health Maintenance: Completed    Prediabetes  This is a chronic health problem for this patient that is showing improvement with her making some lifestyle changes.  She has try to cut back on the amount of refined sugar she is eating and her fasting glucose has dropped from 101 significantly down to 85.  Her A1c was elevated previously at 5.8 and we did not recheck it in light of her having the lower blood sugar.  Her kidney function is perfect.  Patient has had resolution of her right upper quadrant abdominal pain and I suspect she is doing fine    Swallowing difficulty  This continues to be a chronic health problem secondary to her cancer treatment.  She lives with constant dry mouth and has had to have esophageal dilatations twice already.  The gastroenterologist are concerned because there is scar there and they are worried that the tissue will thin.  She hold off on trying to have any more dilatations if possible.  She also utilizes a humidifier at night to try and help with the amount of dryness it is in the air here in [place].    Obesity (BMI 30-39.9)  This is a chronic health problem that is uncontrolled with current medications and lifestyle measures.  Patient is not currently working on weight  loss but will maintain the russo.      Patient Active Problem List    Diagnosis Date Noted   • Rib pain on right side 07/31/2018   • Obesity (BMI 30-39.9) 07/31/2018   • Prediabetes 05/11/2017   • S/P thyroidectomy 05/22/2015   • S/P dilatation of esophageal stricture 05/16/2015   • Swallowing difficulty 01/13/2015   • Hypothyroidism (acquired) 01/13/2015   • Osteopenia 03/30/2013   • Preventative health care 03/17/2013   • Hearing loss 03/17/2013   • Hyperlipidemia 03/11/2013   • History of nasopharyngeal cancer 03/11/2013   • Family history of melanoma 03/11/2013      Allergies:Patient has no known allergies.    Current Outpatient Prescriptions   Medication Sig Dispense Refill   • simvastatin (ZOCOR) 20 MG Tab Take 1 Tab by mouth every evening. 90 Tab 3   • levothyroxine (SYNTHROID) 112 MCG Tab Take 1 Tab by mouth every day. 90 Tab 3   • Zoster Vac Recomb Adjuvanted (SHINGRIX) 50 MCG Recon Susp 0.5 mL by Intramuscular route Once for 1 dose. 1 Each 1   • Potassium 99 MG Tab Take  by mouth.     • meloxicam (MOBIC) 15 MG tablet Take 15 mg by mouth every day.     • tizanidine (ZANAFLEX) 4 MG Tab Take 1 Tab by mouth every 6 hours as needed (1/2to 1 tab 3 times as needed). 30 Tab 0   • amitriptyline (ELAVIL) 25 MG Tab Take 1 Tab by mouth at bedtime as needed. 30 Tab 0   • aspirin EC (ECOTRIN) 81 MG TBEC Take 81 mg by mouth every day. 1 tab 3x a week 30 Tab    • vitamin D (CHOLECALCIFEROL) 1000 UNIT TABS Take 1,000 Units by mouth every day. 30 Tab 11   • Multiple Vitamins-Minerals (WOMENS BONE HEALTH PO) Take  by mouth.     • Probiotic Product (PROBIOTIC DAILY PO) Take  by mouth.     • Famotidine-Ca Carb-Mag Hydrox (ACID REDUCER + ANTACID PO) Take  by mouth.     • naproxen (ALEVE) 220 MG tablet  60 Tab      No current facility-administered medications for this visit.        Social History   Substance Use Topics   • Smoking status: Never Smoker   • Smokeless tobacco: Never Used   • Alcohol use 0.6 oz/week     1 Glasses  "of wine per week      Comment: on rare ocassion     Social History     Social History Narrative    Retired Sales.    . 2 children.    Moved from [place] to [place] 2011.        Family History   Problem Relation Age of Onset   • Diabetes Father    • Hypertension Father    • Hyperlipidemia Father    • Stroke Father    • Lung Disease Mother 68        Emphazema   • Heart Disease Mother         CHF   • Cancer Brother 55        liver cancer   • Other Brother         Melanoma   • Cancer Brother 55        Prostate cancer   • Heart Disease Maternal Grandmother 63        ROS:     - Constitutional:  Negative for fever, chills, unexpected weight change, and fatigue/generalized weakness.    - HEENT:  Negative for headaches, vision changes, hearing changes, ear pain, ear discharge, rhinorrhea, sinus congestion, sore throat, and neck pain.      - Respiratory: Negative for cough, sputum production, chest congestion, dyspnea, wheezing, and crackles.      - Cardiovascular: Negative for chest pain, palpitations, orthopnea, and bilateral lower extremity edema.     - Gastrointestinal: Negative for heartburn, nausea, vomiting, abdominal pain, hematochezia, melena, diarrhea, constipation, and greasy/foul-smelling stools.     - Genitourinary: Negative for dysuria, polyuria, hematuria, pyuria, urinary urgency, and urinary incontinence.     - Musculoskeletal: Negative for myalgias, back pain, and joint pain.     - Skin: Negative for rash, itching, cyanotic skin color change.     - Neurological: Negative for dizziness, tingling, tremors, focal sensory deficit, focal weakness and headaches.     - Endo/Heme/Allergies: Does not bruise/bleed easily.     - Psychiatric/Behavioral: Negative for depression, suicidal/homicidal ideation and memory loss.          - NOTE: All other systems reviewed and are negative, except as in HPI.      Exam:    Blood pressure 110/58, pulse 70, temperature 36.7 °C (98 °F), height 1.6 m (5' 2.99\"), weight 79.3 " kg (174 lb 12.8 oz), last menstrual period 08/01/1995, SpO2 96 %, not currently breastfeeding. Body mass index is 30.97 kg/m².    General:  Well nourished, well developed female in NAD  Head is grossly normal.  Neck: Supple without JVD or bruit. Thyroid is not enlarged.  Pulmonary: Clear to ausculation and percussion.  Normal effort. No rales, ronchi, or wheezing.  Cardiovascular: Regular rate and rhythm without murmur. Carotid and radial pulses are intact and equal bilaterally.  Extremities: no clubbing, cyanosis, or edema.  LABS: 8/2/18: Results reviewed and discussed with the patient, questions answered.    Please note that this dictation was created using voice recognition software. I have made every reasonable attempt to correct obvious errors, but I expect that there are errors of grammar and possibly content that I did not discover before finalizing the note.    Assessment/Plan:  1. Prediabetes  This is a chronic health problem that is well controlled with current medications and lifestyle measures.    2. Dysphagia, unspecified type  Uncontrolled but at her baseline.  Patient has had esophageal dilatations and has to be careful about having these in the future because she is at her limit as far as the thickness of her esophagus secondary to previous cancer treatments.    3. Obesity (BMI 30-39.9)  Stable, patient planning to work on weight loss    4. Hyperlipidemia, unspecified hyperlipidemia type  Well-controlled with current medication.  Patient plans to continue with this simvastatin at 20 mg daily and we will plan to recheck her 4-6 months down the road.  - simvastatin (ZOCOR) 20 MG Tab; Take 1 Tab by mouth every evening.  Dispense: 90 Tab; Refill: 3  - LIPID PROFILE; Future  - COMP METABOLIC PANEL; Future    5. Need for vaccination  Given today  - INFLUENZA VACCINE, HIGH DOSE (65+ ONLY)

## 2018-09-21 NOTE — ASSESSMENT & PLAN NOTE
This continues to be a chronic health problem secondary to her cancer treatment.  She lives with constant dry mouth and has had to have esophageal dilatations twice already.  The gastroenterologist are concerned because there is scar there and they are worried that the tissue will thin.  She hold off on trying to have any more dilatations if possible.  She also utilizes a humidifier at night to try and help with the amount of dryness it is in the air here in [place].

## 2018-09-21 NOTE — ASSESSMENT & PLAN NOTE
This is a chronic health problem that is uncontrolled with current medications and lifestyle measures.  Patient is not currently working on weight loss but will maintain the russo.

## 2018-09-21 NOTE — ASSESSMENT & PLAN NOTE
This is a chronic health problem for this patient that is showing improvement with her making some lifestyle changes.  She has try to cut back on the amount of refined sugar she is eating and her fasting glucose has dropped from 101 significantly down to 85.  Her A1c was elevated previously at 5.8 and we did not recheck it in light of her having the lower blood sugar.  Her kidney function is perfect.  Patient has had resolution of her right upper quadrant abdominal pain and I suspect she is doing fine

## 2019-05-20 ENCOUNTER — HOSPITAL ENCOUNTER (OUTPATIENT)
Dept: RADIOLOGY | Facility: MEDICAL CENTER | Age: 74
End: 2019-05-20
Attending: FAMILY MEDICINE
Payer: MEDICARE

## 2019-05-20 DIAGNOSIS — Z12.31 VISIT FOR SCREENING MAMMOGRAM: ICD-10-CM

## 2019-05-20 PROCEDURE — 77063 BREAST TOMOSYNTHESIS BI: CPT

## 2019-07-08 ENCOUNTER — TELEPHONE (OUTPATIENT)
Dept: MEDICAL GROUP | Facility: MEDICAL CENTER | Age: 74
End: 2019-07-08

## 2019-07-08 NOTE — TELEPHONE ENCOUNTER
ESTABLISHED PATIENT PRE-VISIT PLANNING     Patient was NOT contacted to complete PVP.     Note: Patient will not be contacted if there is no indication to call.     1.  Reviewed notes from the last few office visits within the medical group: Yes    2.  If any orders were placed at last visit or intended to be done for this visit (i.e. 6 mos follow-up), do we have Results/Consult Notes?        •  Labs - Labs ordered, completed on 08/02/2018 and results are in chart.   Note: If patient appointment is for lab review and patient did not complete labs, check with provider if OK to reschedule patient until labs completed.       •  Imaging - Imaging ordered, completed and results are in chart.       •  Referrals - No referrals were ordered at last office visit.    3. Is this appointment scheduled as a Hospital Follow-Up? No    4.  Immunizations were updated in [facility] using Web[employer]?: Yes       •  [employer] Recommendations: MMR  and TDAP    5.  Patient is due for the following Health Maintenance Topics:   Health Maintenance Due   Topic Date Due   • IMM ZOSTER VACCINES (2 of 3) 12/19/2011   • Annual Wellness Visit  06/05/2019       - Patient has completed FLU, PNEUMOVAX (PPSV23) and PREVNAR (PCV13)  Immunization(s) per WebIZ. Chart has been updated.    6. Orders for overdue Health Maintenance topics pended in Pre-Charting? NO    7.  AHA (MDX) form printed for Provider? YES    8.  Patient was NOT informed to arrive 15 min prior to their scheduled appointment and bring in their medication bottles.

## 2019-07-10 ENCOUNTER — HOSPITAL ENCOUNTER (OUTPATIENT)
Dept: LAB | Facility: MEDICAL CENTER | Age: 74
End: 2019-07-10
Attending: FAMILY MEDICINE
Payer: MEDICARE

## 2019-07-10 DIAGNOSIS — E78.5 HYPERLIPIDEMIA, UNSPECIFIED HYPERLIPIDEMIA TYPE: ICD-10-CM

## 2019-07-10 LAB
ALBUMIN SERPL BCP-MCNC: 4.1 G/DL (ref 3.2–4.9)
ALBUMIN/GLOB SERPL: 1.4 G/DL
ALP SERPL-CCNC: 64 U/L (ref 30–99)
ALT SERPL-CCNC: 15 U/L (ref 2–50)
ANION GAP SERPL CALC-SCNC: 9 MMOL/L (ref 0–11.9)
AST SERPL-CCNC: 18 U/L (ref 12–45)
BILIRUB SERPL-MCNC: 0.4 MG/DL (ref 0.1–1.5)
BUN SERPL-MCNC: 18 MG/DL (ref 8–22)
CALCIUM SERPL-MCNC: 10 MG/DL (ref 8.5–10.5)
CHLORIDE SERPL-SCNC: 105 MMOL/L (ref 96–112)
CHOLEST SERPL-MCNC: 182 MG/DL (ref 100–199)
CO2 SERPL-SCNC: 26 MMOL/L (ref 20–33)
CREAT SERPL-MCNC: 0.84 MG/DL (ref 0.5–1.4)
FASTING STATUS PATIENT QL REPORTED: NORMAL
GLOBULIN SER CALC-MCNC: 2.9 G/DL (ref 1.9–3.5)
GLUCOSE SERPL-MCNC: 93 MG/DL (ref 65–99)
HDLC SERPL-MCNC: 60 MG/DL
LDLC SERPL CALC-MCNC: 109 MG/DL
POTASSIUM SERPL-SCNC: 4.5 MMOL/L (ref 3.6–5.5)
PROT SERPL-MCNC: 7 G/DL (ref 6–8.2)
SODIUM SERPL-SCNC: 140 MMOL/L (ref 135–145)
TRIGL SERPL-MCNC: 67 MG/DL (ref 0–149)

## 2019-07-10 PROCEDURE — 36415 COLL VENOUS BLD VENIPUNCTURE: CPT

## 2019-07-10 PROCEDURE — 80061 LIPID PANEL: CPT

## 2019-07-10 PROCEDURE — 80053 COMPREHEN METABOLIC PANEL: CPT

## 2019-07-12 ENCOUNTER — HOSPITAL ENCOUNTER (OUTPATIENT)
Dept: LAB | Facility: MEDICAL CENTER | Age: 74
End: 2019-07-12
Attending: FAMILY MEDICINE
Payer: MEDICARE

## 2019-07-12 ENCOUNTER — TELEPHONE (OUTPATIENT)
Dept: MEDICAL GROUP | Facility: MEDICAL CENTER | Age: 74
End: 2019-07-12

## 2019-07-12 ENCOUNTER — OFFICE VISIT (OUTPATIENT)
Dept: MEDICAL GROUP | Facility: MEDICAL CENTER | Age: 74
End: 2019-07-12
Payer: MEDICARE

## 2019-07-12 VITALS
HEIGHT: 63 IN | TEMPERATURE: 99.5 F | HEART RATE: 58 BPM | DIASTOLIC BLOOD PRESSURE: 62 MMHG | BODY MASS INDEX: 28.81 KG/M2 | SYSTOLIC BLOOD PRESSURE: 122 MMHG | OXYGEN SATURATION: 98 % | WEIGHT: 162.6 LBS

## 2019-07-12 DIAGNOSIS — E78.00 PURE HYPERCHOLESTEROLEMIA: ICD-10-CM

## 2019-07-12 DIAGNOSIS — E66.9 OBESITY (BMI 30-39.9): ICD-10-CM

## 2019-07-12 DIAGNOSIS — Z85.819 HISTORY OF NASOPHARYNGEAL CANCER: ICD-10-CM

## 2019-07-12 DIAGNOSIS — Z87.19 S/P DILATATION OF ESOPHAGEAL STRICTURE: ICD-10-CM

## 2019-07-12 DIAGNOSIS — M85.80 OSTEOPENIA, UNSPECIFIED LOCATION: ICD-10-CM

## 2019-07-12 DIAGNOSIS — Z98.890 S/P DILATATION OF ESOPHAGEAL STRICTURE: ICD-10-CM

## 2019-07-12 DIAGNOSIS — R10.13 EPIGASTRIC ABDOMINAL PAIN OF UNKNOWN ETIOLOGY: ICD-10-CM

## 2019-07-12 DIAGNOSIS — R13.12 OROPHARYNGEAL DYSPHAGIA: ICD-10-CM

## 2019-07-12 DIAGNOSIS — R73.03 PREDIABETES: ICD-10-CM

## 2019-07-12 DIAGNOSIS — H91.93 BILATERAL HEARING LOSS, UNSPECIFIED HEARING LOSS TYPE: ICD-10-CM

## 2019-07-12 DIAGNOSIS — E03.9 HYPOTHYROIDISM (ACQUIRED): ICD-10-CM

## 2019-07-12 PROBLEM — R07.81 RIB PAIN ON RIGHT SIDE: Status: RESOLVED | Noted: 2018-07-31 | Resolved: 2019-07-12

## 2019-07-12 LAB
AMYLASE SERPL-CCNC: 48 U/L (ref 20–103)
LIPASE SERPL-CCNC: 21 U/L (ref 11–82)

## 2019-07-12 PROCEDURE — 83690 ASSAY OF LIPASE: CPT

## 2019-07-12 PROCEDURE — 99213 OFFICE O/P EST LOW 20 MIN: CPT | Mod: 25 | Performed by: FAMILY MEDICINE

## 2019-07-12 PROCEDURE — 82150 ASSAY OF AMYLASE: CPT

## 2019-07-12 PROCEDURE — 36415 COLL VENOUS BLD VENIPUNCTURE: CPT

## 2019-07-12 PROCEDURE — G0439 PPPS, SUBSEQ VISIT: HCPCS | Performed by: FAMILY MEDICINE

## 2019-07-12 RX ORDER — TIZANIDINE 4 MG/1
4 TABLET ORAL EVERY 6 HOURS PRN
Qty: 60 TAB | Refills: 6 | Status: ON HOLD
Start: 2019-07-12 | End: 2019-12-17

## 2019-07-12 RX ORDER — AMITRIPTYLINE HYDROCHLORIDE 25 MG/1
25 TABLET, FILM COATED ORAL NIGHTLY PRN
Qty: 90 TAB | Refills: 3 | Status: SHIPPED | OUTPATIENT
Start: 2019-07-12 | End: 2020-06-30

## 2019-07-12 ASSESSMENT — ENCOUNTER SYMPTOMS: GENERAL WELL-BEING: GOOD

## 2019-07-12 ASSESSMENT — PATIENT HEALTH QUESTIONNAIRE - PHQ9: CLINICAL INTERPRETATION OF PHQ2 SCORE: 0

## 2019-07-12 ASSESSMENT — ACTIVITIES OF DAILY LIVING (ADL): BATHING_REQUIRES_ASSISTANCE: 0

## 2019-07-12 NOTE — ASSESSMENT & PLAN NOTE
This is a new problem ongoing for approximately 3 weeks where the patient notes that when she goes to lay down at night she develops a dull ache directly in the epigastrium about at the level of where she used to have a PEG tube when she had her cancer in 2007.  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  This dull ache comes and goes over the last 3 weeks.  It is not constantly present.  But it is concerning enough that it is keeping her from sleeping because of the discomfort.  The pain does not radiate anywhere she cannot feel any masses or abnormalities when she feels around in the area.  The quality of the pain is dull and it does not change with palpation.  She is found nothing to make it better nothing to make it worse.

## 2019-07-12 NOTE — ASSESSMENT & PLAN NOTE
Chronic health problem for this patient for which she is on levothyroxine 112 mcg daily.  This is well controlled.  We will continue with current meds.

## 2019-07-12 NOTE — ASSESSMENT & PLAN NOTE
Chronic health problem for this patient which continues to be adequately controlled with medication.

## 2019-07-12 NOTE — ASSESSMENT & PLAN NOTE
This was a cancer for this patient in 2007 treated with cisplatin and x-ray for therapy.  She is had no recurrence.  She does inform me though that she still gets a great deal of necrotic tissue out of her left ear and she does not hear as well out of her left ear even with her hearing aid.  She sees the ENT to get that done on a regular basis.

## 2019-07-12 NOTE — TELEPHONE ENCOUNTER
ANNUAL WELLNESS VISIT PRE-VISIT PLANNING WITHOUT OUTREACH    1.  Reviewed note from last office visit with PCP: YES    2.  If any orders were placed at last visit, do we have Results/Consult Notes?        •  Labs - Labs ordered, completed on 08/02/2018 and results are in chart.  Note: If patient appointment is for lab review and patient did not complete labs, check with provider if OK to reschedule patient until labs completed.       •  Imaging - Imaging ordered, completed and results are in chart.       •  Referrals - No referrals were ordered at last office visit.    3.  Immunizations were updated in [facility] using WebIZ?: Yes       •  WebIZ Recommendations: TDAP and SHINGRIX (Shingles)        •  Is patient due for Tdap? NO       •  Is patient due for Shingrix? YES. Patient was not notified of copay/out of pocket cost. Vaccine is on backorder.    4.  Patient is due for the following Health Maintenance Topics:   Health Maintenance Due   Topic Date Due   • IMM ZOSTER VACCINES (2 of 3) 12/19/2011   • Annual Wellness Visit  06/05/2019       - Patient has completed FLU, PNEUMOVAX (PPSV23) and PREVNAR (PCV13)  Immunization(s) per WebIZ. Chart has been updated.    5.  Reviewed/Updated the following with patient:       •   Preferred Pharmacy? NO       •   Preferred Lab? NO       •   Preferred Communication? NO       •   Allergies? NO       •   Medications? NO       •   Social History? NO       •   Family History (document living status of immediate family members and if + hx of  cancer, diabetes, hypertension, hyperlipidemia, heart attack, stroke) NO    6.  Care Team Updated:       •   DME Company (gait device, O2, CPAP, etc.): NO       •   Other Specialists (eye doctor, derm, GYN, cardiology, endo, etc): NO    7. Orders for overdue Health Maintenance topics pended in Pre-Charting? NO    8.  Patient has the following Care Path diagnoses on Problem List:  NONE    9.  Patient was not advised: “This is a free wellness visit. The  provider will screen for medical conditions to help you stay healthy. If you have other concerns to address you may be asked to discuss these at a separate visit or there may be an additional fee.”     10.  Patient was NOT informed to arrive 15 min prior to their scheduled appointment and bring in their medication bottles.

## 2019-07-12 NOTE — ASSESSMENT & PLAN NOTE
This is a chronic health problem for this patient for which she is on simvastatin 20 mg daily.  There has been some additional stress with the new onset of this abdominal pain and her  also had some cardiac issues.  Her total cholesterol is 182, triglycerides excellent at 67, HDL still good at 60 but her LDL went up slightly to 109.  She is working on weight watchers and is lost 15 pounds.  I would like to wait 3 months and see if this does not all correct itself just through her dietary changes.

## 2019-07-12 NOTE — PROGRESS NOTES
Chief Complaint   Patient presents with   • Annual Wellness Visit         HPI:  Yoselin Ackerman is a 74 y.o. here for Medicare Annual Wellness Visit    Patient here for annual wellness exam but she also comes in with an acute problem which is epigastric abdominal pain of unclear etiology.  We will complete her annual wellness visit but start the work-up for the abdominal pain as well.    Patient Active Problem List    Diagnosis Date Noted   • Rib pain on right side 07/31/2018   • Obesity (BMI 30-39.9) 07/31/2018   • Prediabetes 05/11/2017   • S/P thyroidectomy 05/22/2015   • S/P dilatation of esophageal stricture 05/16/2015   • Swallowing difficulty 01/13/2015   • Hypothyroidism (acquired) 01/13/2015   • Osteopenia 03/30/2013   • Preventative health care 03/17/2013   • Hearing loss 03/17/2013   • Hyperlipidemia 03/11/2013   • History of nasopharyngeal cancer 03/11/2013   • Family history of melanoma 03/11/2013       Current Outpatient Prescriptions   Medication Sig Dispense Refill   • simvastatin (ZOCOR) 20 MG Tab Take 1 Tab by mouth every evening. 90 Tab 3   • levothyroxine (SYNTHROID) 112 MCG Tab Take 1 Tab by mouth every day. 90 Tab 3   • Probiotic Product (PROBIOTIC DAILY PO) Take  by mouth.     • aspirin EC (ECOTRIN) 81 MG TBEC Take 81 mg by mouth every day. 1 tab 3x a week 30 Tab    • vitamin D (CHOLECALCIFEROL) 1000 UNIT TABS Take 1,000 Units by mouth every day. 30 Tab 11   • Multiple Vitamins-Minerals (WOMENS BONE HEALTH PO) Take  by mouth.     • Potassium 99 MG Tab Take  by mouth.     • meloxicam (MOBIC) 15 MG tablet Take 15 mg by mouth every day.     • tizanidine (ZANAFLEX) 4 MG Tab Take 1 Tab by mouth every 6 hours as needed (1/2to 1 tab 3 times as needed). (Patient not taking: Reported on 7/12/2019) 30 Tab 0   • amitriptyline (ELAVIL) 25 MG Tab Take 1 Tab by mouth at bedtime as needed. (Patient not taking: Reported on 7/12/2019) 30 Tab 0   • Famotidine-Ca Carb-Mag Hydrox (ACID REDUCER + ANTACID PO) Take  by  mouth.     • naproxen (ALEVE) 220 MG tablet  60 Tab      No current facility-administered medications for this visit.         Patient is taking medications as noted in medication list.  Current supplements as per medication list.     Allergies: Patient has no known allergies.    Current social contact/activities: pt reports going out with friends, dinners at home     Is patient current with immunizations? No, due for SHINGRIX (Shingles). Patient is interested in receiving NONE today.Vaccine on backorder    She  reports that she has never smoked. She has never used smokeless tobacco. She reports that she drinks about 0.6 oz of alcohol per week . She reports that she does not use drugs.  Counseling given: Yes        DPA/Advanced directive: Patient has Advanced Directive on file.     ROS:    Gait: Uses no assistive device   Ostomy: No   Other tubes: No   Amputations: No   Chronic oxygen use No   Last eye exam pt reports 1 year ago   Wears hearing aids: Yes   : Reports urinary leakage during the last 6 months that has not interfered at all with their daily activities or sleep.        Depression Screening    Little interest or pleasure in doing things?  0 - not at all  Feeling down, depressed, or hopeless? 0 - not at all  Patient Health Questionnaire Score: 0    If depressive symptoms identified deferred to follow up visit unless specifically addressed in assessment and plan.    Interpretation of PHQ-9 Total Score   Score Severity   1-4 No Depression   5-9 Mild Depression   10-14 Moderate Depression   15-19 Moderately Severe Depression   20-27 Severe Depression    Screening for Cognitive Impairment    Three Minute Recall (village, kitchen, baby)  3/3    Zach clock face with all 12 numbers and set the hands to show 10 past 10.  Yes    If cognitive concerns identified, deferred for follow up unless specifically addressed in assessment and plan.    Fall Risk Assessment    Has the patient had two or more falls in the last  year or any fall with injury in the last year?  No  If fall risk identified, deferred for follow up unless specifically addressed in assessment and plan.    Safety Assessment    Throw rugs on floor.  No  Handrails on all stairs.  Yes  Good lighting in all hallways.  Yes  Difficulty hearing.  No  Patient counseled about all safety risks that were identified.    Functional Assessment ADLs    Are there any barriers preventing you from cooking for yourself or meeting nutritional needs?  No.    Are there any barriers preventing you from driving safely or obtaining transportation?  No.    Are there any barriers preventing you from using a telephone or calling for help?  No.    Are there any barriers preventing you from shopping?  No.    Are there any barriers preventing you from taking care of your own finances?  No.    Are there any barriers preventing you from managing your medications?  No.    Are there any barriers preventing you from showering, bathing or dressing yourself?  No.    Are you currently engaging in any exercise or physical activity?  Yes.  Pt reports walking, trying to get 10.000 steps every day  What is your perception of your health?  Good.    Health Maintenance Summary                IMM ZOSTER VACCINES Overdue 12/19/2011      Done 10/24/2011 Imm Admin: Zoster Vaccine Live (ZVL) (Zostavax)    Annual Wellness Visit Overdue 6/5/2019      Done 6/4/2018      Patient has more history with this topic...    IMM INFLUENZA Next Due 9/1/2019      Done 9/21/2018 Imm Admin: Influenza Vaccine Adult HD     Patient has more history with this topic...    IMM DTaP/Tdap/Td Vaccine Next Due 10/27/2019      Done 10/27/2009 Imm Admin: Tdap Vaccine    BONE DENSITY Next Due 11/14/2019      Done 11/14/2017 DS-BONE DENSITY STUDY (DEXA)     Patient has more history with this topic...    MAMMOGRAM Next Due 5/20/2020      Done 5/20/2019 MA-SCREENING MAMMO BILAT W/TOMOSYNTHESIS W/CAD     Patient has more history with this  topic...    COLONOSCOPY Next Due 1/5/2026      Done 1/5/2016 AMB REFERRAL TO GI FOR COLONOSCOPY          Patient Care Team:  Kathy Soto M.D. as PCP - General (Family Medicine)  KAREEM Guido as Mid Level Provider (Dermatology)  Slick Pickett O.D. as Consulting Physician (Optometry)  Raimundo Love DDS as Consulting Physician (Dentistry)  Britt Benjamin M.D. as Consulting Physician (Endocrinology)  Jorge L Weinstein as Consulting Physician (Orthopaedics)  Mitchel Manuel M.D. as Consulting Physician (Otolaryngology)    Social History   Substance Use Topics   • Smoking status: Never Smoker   • Smokeless tobacco: Never Used   • Alcohol use 0.6 oz/week     1 Glasses of wine per week      Comment: on rare ocassion     Family History   Problem Relation Age of Onset   • Diabetes Father    • Hypertension Father    • Hyperlipidemia Father    • Stroke Father    • Lung Disease Mother 68        Emphazema   • Heart Disease Mother         CHF   • Cancer Brother 55        liver cancer   • Other Brother         Melanoma   • Cancer Brother 55        Prostate cancer   • Heart Disease Maternal Grandmother 63     She  has a past medical history of Family history of melanoma (3/11/2013); Flat foot(735) (3/17/2013); Heart burn; Herniated cervical disc; History of nasopharyngeal cancer (3/11/2013); Hyperlipidemia (3/11/2013); Impaired fasting glucose (3/17/2013); Indigestion; Other lymphedema (3/11/2013); Prediabetes (5/11/2017); Rib pain on right side (7/31/2018); S/P dilatation of esophageal stricture (5/16/2015); S/P thyroidectomy (5/22/2015); SENSORINEURAL HEARING LOSS (3/17/2013); Shoulder pain (2014); Unspecified cataract; and Unspecified urinary incontinence.   Past Surgical History:   Procedure Laterality Date   • THYROIDECTOMY TOTAL  11/19/2014    Performed by Lukas De Santiago M.D. at SURGERY SAME DAY [facility]   • BICEPS TENODESIS  5/22/2014    Performed by Cain Gerardo M.D. at SURGERY [facility]  "  • SHOULDER ARTHROSCOPY W/ ROTATOR CUFF REPAIR  5/22/2014    Performed by Cain Gerardo M.D. at SURGERY [facility]   • SHOULDER DECOMPRESSION ARTHROSCOPIC  5/22/2014    Performed by Cain Gerardo M.D. at SURGERY [facility]   • CLAVICLE DISTAL EXCISION  5/22/2014    Performed by Cain Gerardo M.D. at [facility]   • TENDON RELEASE FOOT  August 2013    [email or web address]    • OTHER ORTHOPEDIC SURGERY  2006    reconstruction  left foot   • CHOLECYSTECTOMY  1988   • BUNIONECTOMY  1988   • TUBAL LIGATION  1982   • CATARACT PHACO WITH IOL      Ry   • GYN SURGERY      tubal   • TONSILLECTOMY     • US-NEEDLE CORE BX-BREAST PANEL             Exam:     /62 (BP Location: Left arm, Patient Position: Sitting, BP Cuff Size: Adult)   Pulse (!) 58   Temp 37.5 °C (99.5 °F) (Temporal)   Ht 1.608 m (5' 3.31\")   Wt 73.8 kg (162 lb 9.6 oz)   SpO2 98%  Body mass index is 28.52 kg/m².    Hearing good.    Dentition good  Alert, oriented in no acute distress.  Eye contact is good, speech goal directed, affect calm      Assessment and Plan. The following treatment and monitoring plan is recommended:    Epigastric abdominal pain of unknown etiology  This is a new problem ongoing for approximately 3 weeks where the patient notes that when she goes to lay down at night she develops a dull ache directly in the epigastrium about at the level of where she used to have a PEG tube when she had her cancer in 2007.  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  This dull ache comes and goes over the last 3 weeks.  It is not constantly present.  But it is concerning enough that it is keeping her from sleeping because of the discomfort.  The pain does not radiate anywhere she cannot feel any masses or abnormalities when she feels around in the area.  The quality of the pain is dull and it does not change with palpation.  She is found nothing to " make it better nothing to make it worse.    Hearing loss  This is a chronic health problem for which patient is utilizing hearing aids with good results.    History of nasopharyngeal cancer  This was a cancer for this patient in 2007 treated with cisplatin and x-ray for therapy.  She is had no recurrence.  She does inform me though that she still gets a great deal of necrotic tissue out of her left ear and she does not hear as well out of her left ear even with her hearing aid.  She sees the ENT to get that done on a regular basis.    Hyperlipidemia  This is a chronic health problem for this patient for which she is on simvastatin 20 mg daily.  There has been some additional stress with the new onset of this abdominal pain and her  also had some cardiac issues.  Her total cholesterol is 182, triglycerides excellent at 67, HDL still good at 60 but her LDL went up slightly to 109.  She is working on weight watchers and is lost 15 pounds.  I would like to wait 3 months and see if this does not all correct itself just through her dietary changes.    Hypothyroidism (acquired)  Chronic health problem for this patient for which she is on levothyroxine 112 mcg daily.  This is well controlled.  We will continue with current meds.    Obesity (BMI 30-39.9)  Chronic health problem for this patient for which she is doing weight watchers.  She is done an excellent job and her BMI is now down to 28.52.  We will resolve this health problem.    Osteopenia  Chronic health problem for this patient which continues to be adequately controlled with medication.    Prediabetes  Controlled, continue with current meds and lifestyle.      S/P dilatation of esophageal stricture  This continues to be a problem for this patient where she feels as if there is a stricture within the esophagus.  She continues to have problems swallowing.  She will make herself an appointment with gastroenterology previous stricture and possible need for a  repeat dilatation.  To follow-up on her thyroid.    Swallowing difficulty  Her up to be seen at gastroenterology.This continues to be a problem will see Sae .        Services suggested: No services needed at this time  Health Care Screening recommendations as per orders if indicated.  Referrals offered: PT/OT/Nutrition counseling/Behavioral Health/Smoking cessation as per orders if indicated.    Discussion today about general wellness and lifestyle habits:    · Prevent falls and reduce trip hazards; Cautioned about securing or removing rugs.  · Have a working fire alarm and carbon monoxide detector;   · Engage in regular physical activity and social activities       Follow-up: We will see the patient back in 1 week to go over the results from her CT scan and labs.    HPI:   Epigastric abdominal pain of unknown etiology  This is a new problem ongoing for approximately 3 weeks where the patient notes that when she goes to lay down at night she develops a dull ache directly in the epigastrium about at the level of where she used to have a PEG tube when she had her cancer in 2007.  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  This dull ache comes and goes over the last 3 weeks.  It is not constantly present.  But it is concerning enough that it is keeping her from sleeping because of the discomfort.  The pain does not radiate anywhere she cannot feel any masses or abnormalities when she feels around in the area.  The quality of the pain is dull and it does not change with palpation.  She is found nothing to make it better nothing to make it worse    Physical exam:  Abdomen: Positive bowel sounds soft nontender nondistended no hepatosplenomegaly.  No masses.    A/P: Epigastric abdominal pain: We will get an amylase and a lipase looking for etiology of the pain.  We will also get a CT scan of the abdomen with and without contrast.   Patient does had a comprehensive metabolic panel that was completely normal.  We will see the patient back within 1 week to go over the results of all of her testing.

## 2019-07-12 NOTE — ASSESSMENT & PLAN NOTE
This continues to be a problem for this patient where she feels as if there is a stricture within the esophagus.  She continues to have problems swallowing.  She will make herself an appointment with gastroenterology previous stricture and possible need for a repeat dilatation.  To follow-up on her thyroid.

## 2019-07-12 NOTE — ASSESSMENT & PLAN NOTE
Chronic health problem for this patient for which she is doing weight watchers.  She is done an excellent job and her BMI is now down to 28.52.  We will resolve this health problem.

## 2019-07-15 ENCOUNTER — HOSPITAL ENCOUNTER (OUTPATIENT)
Dept: RADIOLOGY | Facility: MEDICAL CENTER | Age: 74
End: 2019-07-15
Attending: FAMILY MEDICINE
Payer: MEDICARE

## 2019-07-15 DIAGNOSIS — R10.13 EPIGASTRIC ABDOMINAL PAIN OF UNKNOWN ETIOLOGY: ICD-10-CM

## 2019-07-15 PROCEDURE — 74160 CT ABDOMEN W/CONTRAST: CPT

## 2019-07-15 PROCEDURE — 700117 HCHG RX CONTRAST REV CODE 255: Performed by: FAMILY MEDICINE

## 2019-07-15 RX ADMIN — IOHEXOL 50 ML: 240 INJECTION, SOLUTION INTRATHECAL; INTRAVASCULAR; INTRAVENOUS; ORAL at 09:15

## 2019-07-15 RX ADMIN — IOHEXOL 100 ML: 350 INJECTION, SOLUTION INTRAVENOUS at 09:15

## 2019-07-17 ENCOUNTER — TELEPHONE (OUTPATIENT)
Dept: MEDICAL GROUP | Facility: MEDICAL CENTER | Age: 74
End: 2019-07-17

## 2019-07-17 NOTE — TELEPHONE ENCOUNTER
ESTABLISHED PATIENT PRE-VISIT PLANNING     Patient was NOT contacted to complete PVP.     Note: Patient will not be contacted if there is no indication to call.     1.  Reviewed notes from the last few office visits within the medical group: Yes    2.  If any orders were placed at last visit or intended to be done for this visit (i.e. 6 mos follow-up), do we have Results/Consult Notes?        •  Labs - Labs ordered, completed on 07/12/2019 and results are in chart.   Note: If patient appointment is for lab review and patient did not complete labs, check with provider if OK to reschedule patient until labs completed.       •  Imaging - Imaging ordered, completed and results are in chart.       •  Referrals - Referral ordered, patient has NOT been seen.    3. Is this appointment scheduled as a Hospital Follow-Up? No    4.  Immunizations were updated in [facility] using WebIZ?: Yes       •  Web Iz Recommendations: MMR , TDAP and SHINGRIX (Shingles)    5.  Patient is due for the following Health Maintenance Topics:   Health Maintenance Due   Topic Date Due   • IMM ZOSTER VACCINES (2 of 3) 12/19/2011       - Patient has completed FLU, PNEUMOVAX (PPSV23) and PREVNAR (PCV13)  Immunization(s) per WebIZ. Chart has been updated.    6. Orders for overdue Health Maintenance topics pended in Pre-Charting? NO    7.  AHA (MDX) form printed for Provider? No, already completed    8.  Patient was NOT informed to arrive 15 min prior to their scheduled appointment and bring in their medication bottles.

## 2019-07-19 ENCOUNTER — OFFICE VISIT (OUTPATIENT)
Dept: MEDICAL GROUP | Facility: MEDICAL CENTER | Age: 74
End: 2019-07-19
Payer: MEDICARE

## 2019-07-19 VITALS
OXYGEN SATURATION: 96 % | BODY MASS INDEX: 28.7 KG/M2 | RESPIRATION RATE: 14 BRPM | HEIGHT: 63 IN | SYSTOLIC BLOOD PRESSURE: 120 MMHG | HEART RATE: 66 BPM | DIASTOLIC BLOOD PRESSURE: 62 MMHG | TEMPERATURE: 98.2 F | WEIGHT: 162 LBS

## 2019-07-19 DIAGNOSIS — E78.5 HYPERLIPIDEMIA, UNSPECIFIED HYPERLIPIDEMIA TYPE: ICD-10-CM

## 2019-07-19 DIAGNOSIS — R10.13 EPIGASTRIC ABDOMINAL PAIN OF UNKNOWN ETIOLOGY: ICD-10-CM

## 2019-07-19 DIAGNOSIS — E03.9 HYPOTHYROIDISM (ACQUIRED): ICD-10-CM

## 2019-07-19 PROCEDURE — 99214 OFFICE O/P EST MOD 30 MIN: CPT | Performed by: FAMILY MEDICINE

## 2019-07-19 RX ORDER — SIMVASTATIN 20 MG
20 TABLET ORAL EVERY EVENING
Qty: 90 TAB | Refills: 3 | Status: SHIPPED | OUTPATIENT
Start: 2019-07-19 | End: 2020-07-27 | Stop reason: SDUPTHER

## 2019-07-19 RX ORDER — LEVOTHYROXINE SODIUM 112 UG/1
112 TABLET ORAL
Qty: 90 TAB | Refills: 3 | Status: ON HOLD | OUTPATIENT
Start: 2019-07-19 | End: 2019-12-17

## 2019-07-19 NOTE — ASSESSMENT & PLAN NOTE
This is a chronic health problem adequately controlled on levothyroxine 112 mcg daily except for half tab on Sundays.  She will continue on this dose and we will plan to recheck her thyroid upon her return from her travels.

## 2019-07-19 NOTE — ASSESSMENT & PLAN NOTE
This is a chronic health problem for which the patient utilizes simvastatin 20 mg daily.  Somehow her medications got confused at the pharmacy and she needs a new prescription that she can take with her.  I will do that for her today.

## 2019-07-19 NOTE — ASSESSMENT & PLAN NOTE
Patient here today to follow-up on the CT scan that was done because of this epigastric abdominal pain.  I was very concerned because patient is getting ready to travel for the coming month and I did not want her traveling with the possibility of something brewing in her abdomen causing the pain.  She had her CT scan done 7/15/2019 it comes back showing no acute abnormality.  Mild right renal cortical scarring, colonic diverticulosis and a stable left adrenal nodule thought to be an adenoma.  Patient reassured by this and she states that her epigastric abdominal pain is actually gotten much better.

## 2019-07-19 NOTE — PROGRESS NOTES
CC:Diagnoses of Hyperlipidemia, unspecified hyperlipidemia type, Epigastric abdominal pain of unknown etiology, and Hypothyroidism (acquired) were pertinent to this visit.    HISTORY OF PRESENT ILLNESS: Patient is a 74 y.o. female established patient who presents today to talk about her abdominal CT scan and to discuss her hyperlipidemia and hypothyroidism.  Patient confused on her medications.  She brought both of those meds with this they were in bottles that each that they were simvastatin and it turns out her levothyroxine was 1 of the 2 medications so we clarified this by utilizing the pill identifier.    Health Maintenance: Completed    Epigastric abdominal pain of unknown etiology  Patient here today to follow-up on the CT scan that was done because of this epigastric abdominal pain.  I was very concerned because patient is getting ready to travel for the coming month and I did not want her traveling with the possibility of something brewing in her abdomen causing the pain.  She had her CT scan done 7/15/2019 it comes back showing no acute abnormality.  Mild right renal cortical scarring, colonic diverticulosis and a stable left adrenal nodule thought to be an adenoma.  Patient reassured by this and she states that her epigastric abdominal pain is actually gotten much better.    Hyperlipidemia  This is a chronic health problem for which the patient utilizes simvastatin 20 mg daily.  Somehow her medications got confused at the pharmacy and she needs a new prescription that she can take with her.  I will do that for her today.    Hypothyroidism (acquired)  This is a chronic health problem adequately controlled on levothyroxine 112 mcg daily except for half tab on Sundays.  She will continue on this dose and we will plan to recheck her thyroid upon her return from her travels.      Patient Active Problem List    Diagnosis Date Noted   • Epigastric abdominal pain of unknown etiology 07/12/2019   • Prediabetes  05/11/2017   • S/P thyroidectomy 05/22/2015   • S/P dilatation of esophageal stricture 05/16/2015   • Swallowing difficulty 01/13/2015   • Hypothyroidism (acquired) 01/13/2015   • Osteopenia 03/30/2013   • Preventative health care 03/17/2013   • Hearing loss 03/17/2013   • Hyperlipidemia 03/11/2013   • History of nasopharyngeal cancer 03/11/2013   • Family history of melanoma 03/11/2013      Allergies:Patient has no known allergies.    Current Outpatient Prescriptions   Medication Sig Dispense Refill   • levothyroxine (SYNTHROID) 112 MCG Tab Take 1 Tab by mouth every day. 90 Tab 3   • simvastatin (ZOCOR) 20 MG Tab Take 1 Tab by mouth every evening. 90 Tab 3   • amitriptyline (ELAVIL) 25 MG Tab Take 1 Tab by mouth at bedtime as needed. 90 Tab 3   • tizanidine (ZANAFLEX) 4 MG Tab Take 1 Tab by mouth every 6 hours as needed (1/2to 1 tab 3 times as needed). 60 Tab 6   • Potassium 99 MG Tab Take  by mouth.     • meloxicam (MOBIC) 15 MG tablet Take 15 mg by mouth every day.     • Probiotic Product (PROBIOTIC DAILY PO) Take  by mouth.     • Famotidine-Ca Carb-Mag Hydrox (ACID REDUCER + ANTACID PO) Take  by mouth.     • aspirin EC (ECOTRIN) 81 MG TBEC Take 81 mg by mouth every day. 1 tab 3x a week 30 Tab    • naproxen (ALEVE) 220 MG tablet  60 Tab    • vitamin D (CHOLECALCIFEROL) 1000 UNIT TABS Take 1,000 Units by mouth every day. 30 Tab 11   • Multiple Vitamins-Minerals (WOMENS BONE HEALTH PO) Take  by mouth.       No current facility-administered medications for this visit.        Social History   Substance Use Topics   • Smoking status: Never Smoker   • Smokeless tobacco: Never Used   • Alcohol use 0.6 oz/week     1 Glasses of wine per week      Comment: on rare ocassion     Social History     Social History Narrative    Retired Sales.    . 2 children.    Moved from [place] to [place] 2011.        Family History   Problem Relation Age of Onset   • Diabetes Father    • Hypertension Father    • Hyperlipidemia  "Father    • Stroke Father    • Lung Disease Mother 68        Emphazema   • Heart Disease Mother         CHF   • Cancer Brother 55        liver cancer   • Other Brother         Melanoma   • Cancer Brother 55        Prostate cancer   • Heart Disease Maternal Grandmother 63        ROS:     - Constitutional:  Negative for fever, chills, unexpected weight change, and fatigue/generalized weakness.    - HEENT:  Negative for headaches, vision changes, hearing changes, ear pain, ear discharge, rhinorrhea, sinus congestion, sore throat, and neck pain.      - Respiratory: Negative for cough, sputum production, chest congestion, dyspnea, wheezing, and crackles.      - Cardiovascular: Negative for chest pain, palpitations, orthopnea, and bilateral lower extremity edema.     - Gastrointestinal: Negative for heartburn, nausea, vomiting, abdominal pain, hematochezia, melena, diarrhea, constipation, and greasy/foul-smelling stools.     - Genitourinary: Negative for dysuria, polyuria, hematuria, pyuria, urinary urgency, and urinary incontinence.     - Musculoskeletal: Negative for myalgias, back pain, and joint pain.     - Skin: Negative for rash, itching, cyanotic skin color change.     - Neurological: Negative for dizziness, tingling, tremors, focal sensory deficit, focal weakness and headaches.     - Endo/Heme/Allergies: Does not bruise/bleed easily.     - Psychiatric/Behavioral: Patient has noted increased stress secondary to her 's health issues.  She denies depression, suicidal or homicidal ideation.           - NOTE: All other systems reviewed and are negative, except as in HPI.      Exam:    /62 (BP Location: Left arm, Patient Position: Sitting, BP Cuff Size: Adult)   Pulse 66   Temp 36.8 °C (98.2 °F) (Temporal)   Resp 14   Ht 1.608 m (5' 3.31\")   Wt 73.5 kg (162 lb)   SpO2 96%  Body mass index is 28.42 kg/m².    General:  Well nourished, well developed female in NAD  Head is grossly normal.    Patient was " seen for 25 minutes face to face of which, 20 minutes was spent counseling regarding medications interactions and side effects and review of the CT report.  We discussed each of the findings in detail..    Please note that this dictation was created using voice recognition software. I have made every reasonable attempt to correct obvious errors, but I expect that there are errors of grammar and possibly content that I did not discover before finalizing the note.    Assessment/Plan:  1. Hyperlipidemia, unspecified hyperlipidemia type  Adequately controlled with current dose.  New prescription provided.  - simvastatin (ZOCOR) 20 MG Tab; Take 1 Tab by mouth every evening.  Dispense: 90 Tab; Refill: 3    2. Epigastric abdominal pain of unknown etiology  Now appears to be resolved with no acute abnormality found on CT scan.  We did discuss diverticulosis and how to best deal with this going forward    3. Hypothyroidism (acquired)  Adequately controlled on current meds.  We will plan to recheck in 1 year since her last TSH test.

## 2019-12-17 ENCOUNTER — ANESTHESIA EVENT (OUTPATIENT)
Dept: SURGERY | Facility: MEDICAL CENTER | Age: 74
End: 2019-12-17
Payer: MEDICARE

## 2019-12-17 ENCOUNTER — ANESTHESIA (OUTPATIENT)
Dept: SURGERY | Facility: MEDICAL CENTER | Age: 74
End: 2019-12-17
Payer: MEDICARE

## 2019-12-17 ENCOUNTER — HOSPITAL ENCOUNTER (OUTPATIENT)
Facility: MEDICAL CENTER | Age: 74
End: 2019-12-17
Attending: OPHTHALMOLOGY | Admitting: OPHTHALMOLOGY
Payer: MEDICARE

## 2019-12-17 VITALS
SYSTOLIC BLOOD PRESSURE: 114 MMHG | BODY MASS INDEX: 30.43 KG/M2 | RESPIRATION RATE: 20 BRPM | WEIGHT: 165.34 LBS | HEIGHT: 62 IN | OXYGEN SATURATION: 95 % | HEART RATE: 71 BPM | DIASTOLIC BLOOD PRESSURE: 57 MMHG | TEMPERATURE: 97.4 F

## 2019-12-17 PROCEDURE — 700111 HCHG RX REV CODE 636 W/ 250 OVERRIDE (IP): Performed by: ANESTHESIOLOGY

## 2019-12-17 PROCEDURE — 700105 HCHG RX REV CODE 258: Performed by: OPHTHALMOLOGY

## 2019-12-17 PROCEDURE — 160025 RECOVERY II MINUTES (STATS): Performed by: OPHTHALMOLOGY

## 2019-12-17 PROCEDURE — 501836 HCHG SUTURE EYE: Performed by: OPHTHALMOLOGY

## 2019-12-17 PROCEDURE — 160009 HCHG ANES TIME/MIN: Performed by: OPHTHALMOLOGY

## 2019-12-17 PROCEDURE — 160029 HCHG SURGERY MINUTES - 1ST 30 MINS LEVEL 4: Performed by: OPHTHALMOLOGY

## 2019-12-17 PROCEDURE — 160047 HCHG PACU  - EA ADDL 30 MINS PHASE II: Performed by: OPHTHALMOLOGY

## 2019-12-17 PROCEDURE — 501838 HCHG SUTURE GENERAL: Performed by: OPHTHALMOLOGY

## 2019-12-17 PROCEDURE — 160041 HCHG SURGERY MINUTES - EA ADDL 1 MIN LEVEL 4: Performed by: OPHTHALMOLOGY

## 2019-12-17 PROCEDURE — 500558 HCHG EYE SHIELD W/GARTER (FOX): Performed by: OPHTHALMOLOGY

## 2019-12-17 PROCEDURE — A6410 STERILE EYE PAD: HCPCS | Performed by: OPHTHALMOLOGY

## 2019-12-17 PROCEDURE — 160046 HCHG PACU - 1ST 60 MINS PHASE II: Performed by: OPHTHALMOLOGY

## 2019-12-17 PROCEDURE — 700101 HCHG RX REV CODE 250: Performed by: OPHTHALMOLOGY

## 2019-12-17 PROCEDURE — 160036 HCHG PACU - EA ADDL 30 MINS PHASE I: Performed by: OPHTHALMOLOGY

## 2019-12-17 PROCEDURE — 700101 HCHG RX REV CODE 250

## 2019-12-17 PROCEDURE — 700111 HCHG RX REV CODE 636 W/ 250 OVERRIDE (IP): Performed by: OPHTHALMOLOGY

## 2019-12-17 PROCEDURE — 160048 HCHG OR STATISTICAL LEVEL 1-5: Performed by: OPHTHALMOLOGY

## 2019-12-17 PROCEDURE — 160002 HCHG RECOVERY MINUTES (STAT): Performed by: OPHTHALMOLOGY

## 2019-12-17 PROCEDURE — 700101 HCHG RX REV CODE 250: Performed by: ANESTHESIOLOGY

## 2019-12-17 PROCEDURE — 160035 HCHG PACU - 1ST 60 MINS PHASE I: Performed by: OPHTHALMOLOGY

## 2019-12-17 RX ORDER — CYCLOPENTOLATE HYDROCHLORIDE 10 MG/ML
SOLUTION/ DROPS OPHTHALMIC
Status: COMPLETED
Start: 2019-12-17 | End: 2019-12-17

## 2019-12-17 RX ORDER — BALANCED SALT SOLUTION ENRICHED WITH BICARBONATE, DEXTROSE, AND GLUTATHIONE
KIT INTRAOCULAR
Status: DISCONTINUED | OUTPATIENT
Start: 2019-12-17 | End: 2019-12-17 | Stop reason: HOSPADM

## 2019-12-17 RX ORDER — SODIUM CHLORIDE, SODIUM LACTATE, POTASSIUM CHLORIDE, CALCIUM CHLORIDE 600; 310; 30; 20 MG/100ML; MG/100ML; MG/100ML; MG/100ML
INJECTION, SOLUTION INTRAVENOUS CONTINUOUS
Status: DISCONTINUED | OUTPATIENT
Start: 2019-12-17 | End: 2019-12-18 | Stop reason: HOSPADM

## 2019-12-17 RX ORDER — CEFAZOLIN SODIUM 1 G/3ML
INJECTION, POWDER, FOR SOLUTION INTRAMUSCULAR; INTRAVENOUS
Status: DISCONTINUED | OUTPATIENT
Start: 2019-12-17 | End: 2019-12-17 | Stop reason: HOSPADM

## 2019-12-17 RX ORDER — MIDAZOLAM HYDROCHLORIDE 1 MG/ML
INJECTION INTRAMUSCULAR; INTRAVENOUS PRN
Status: DISCONTINUED | OUTPATIENT
Start: 2019-12-17 | End: 2019-12-17 | Stop reason: SURG

## 2019-12-17 RX ORDER — TRIAMCINOLONE ACETONIDE 40 MG/ML
INJECTION, SUSPENSION INTRA-ARTICULAR; INTRAMUSCULAR
Status: DISCONTINUED | OUTPATIENT
Start: 2019-12-17 | End: 2019-12-17 | Stop reason: HOSPADM

## 2019-12-17 RX ORDER — PHENYLEPHRINE HYDROCHLORIDE 10 MG/ML
INJECTION, SOLUTION INTRAMUSCULAR; INTRAVENOUS; SUBCUTANEOUS PRN
Status: DISCONTINUED | OUTPATIENT
Start: 2019-12-17 | End: 2019-12-17 | Stop reason: SURG

## 2019-12-17 RX ORDER — LIDOCAINE HYDROCHLORIDE 20 MG/ML
INJECTION, SOLUTION EPIDURAL; INFILTRATION; INTRACAUDAL; PERINEURAL PRN
Status: DISCONTINUED | OUTPATIENT
Start: 2019-12-17 | End: 2019-12-17 | Stop reason: SURG

## 2019-12-17 RX ORDER — HYDRALAZINE HYDROCHLORIDE 20 MG/ML
INJECTION INTRAMUSCULAR; INTRAVENOUS
Status: DISCONTINUED
Start: 2019-12-17 | End: 2019-12-18 | Stop reason: HOSPADM

## 2019-12-17 RX ORDER — MOXIFLOXACIN 5 MG/ML
SOLUTION/ DROPS OPHTHALMIC
Status: COMPLETED
Start: 2019-12-17 | End: 2019-12-17

## 2019-12-17 RX ORDER — DIPHENHYDRAMINE HYDROCHLORIDE 50 MG/ML
12.5 INJECTION INTRAMUSCULAR; INTRAVENOUS
Status: DISCONTINUED | OUTPATIENT
Start: 2019-12-17 | End: 2019-12-17

## 2019-12-17 RX ORDER — OXYCODONE HCL 5 MG/5 ML
10 SOLUTION, ORAL ORAL
Status: DISCONTINUED | OUTPATIENT
Start: 2019-12-17 | End: 2019-12-18 | Stop reason: HOSPADM

## 2019-12-17 RX ORDER — OXYCODONE HCL 5 MG/5 ML
5 SOLUTION, ORAL ORAL
Status: DISCONTINUED | OUTPATIENT
Start: 2019-12-17 | End: 2019-12-18 | Stop reason: HOSPADM

## 2019-12-17 RX ORDER — BALANCED SALT SOLUTION 6.4; .75; .48; .3; 3.9; 1.7 MG/ML; MG/ML; MG/ML; MG/ML; MG/ML; MG/ML
SOLUTION OPHTHALMIC
Status: DISCONTINUED | OUTPATIENT
Start: 2019-12-17 | End: 2019-12-17 | Stop reason: HOSPADM

## 2019-12-17 RX ORDER — HALOPERIDOL 5 MG/ML
1 INJECTION INTRAMUSCULAR
Status: DISCONTINUED | OUTPATIENT
Start: 2019-12-17 | End: 2019-12-18 | Stop reason: HOSPADM

## 2019-12-17 RX ORDER — HALOPERIDOL 5 MG/ML
1 INJECTION INTRAMUSCULAR
Status: DISCONTINUED | OUTPATIENT
Start: 2019-12-17 | End: 2019-12-17

## 2019-12-17 RX ORDER — ONDANSETRON 2 MG/ML
4 INJECTION INTRAMUSCULAR; INTRAVENOUS
Status: DISCONTINUED | OUTPATIENT
Start: 2019-12-17 | End: 2019-12-17

## 2019-12-17 RX ORDER — ONDANSETRON 2 MG/ML
4 INJECTION INTRAMUSCULAR; INTRAVENOUS
Status: DISCONTINUED | OUTPATIENT
Start: 2019-12-17 | End: 2019-12-18 | Stop reason: HOSPADM

## 2019-12-17 RX ORDER — DEXAMETHASONE SODIUM PHOSPHATE 4 MG/ML
INJECTION, SOLUTION INTRA-ARTICULAR; INTRALESIONAL; INTRAMUSCULAR; INTRAVENOUS; SOFT TISSUE
Status: DISCONTINUED | OUTPATIENT
Start: 2019-12-17 | End: 2019-12-17 | Stop reason: HOSPADM

## 2019-12-17 RX ORDER — BALANCED SALT SOLUTION ENRICHED WITH BICARBONATE, DEXTROSE, AND GLUTATHIONE
KIT INTRAOCULAR
Status: DISCONTINUED
Start: 2019-12-17 | End: 2019-12-18 | Stop reason: HOSPADM

## 2019-12-17 RX ORDER — DIPHENHYDRAMINE HYDROCHLORIDE 50 MG/ML
12.5 INJECTION INTRAMUSCULAR; INTRAVENOUS
Status: DISCONTINUED | OUTPATIENT
Start: 2019-12-17 | End: 2019-12-18 | Stop reason: HOSPADM

## 2019-12-17 RX ORDER — HYDRALAZINE HYDROCHLORIDE 20 MG/ML
5 INJECTION INTRAMUSCULAR; INTRAVENOUS
Status: DISCONTINUED | OUTPATIENT
Start: 2019-12-17 | End: 2019-12-18 | Stop reason: HOSPADM

## 2019-12-17 RX ORDER — TROPICAMIDE 10 MG/ML
SOLUTION/ DROPS OPHTHALMIC
Status: COMPLETED
Start: 2019-12-17 | End: 2019-12-17

## 2019-12-17 RX ORDER — PHENYLEPHRINE HYDROCHLORIDE 25 MG/ML
SOLUTION/ DROPS OPHTHALMIC
Status: COMPLETED
Start: 2019-12-17 | End: 2019-12-17

## 2019-12-17 RX ORDER — FLURBIPROFEN SODIUM 0.3 MG/ML
SOLUTION/ DROPS OPHTHALMIC
Status: COMPLETED
Start: 2019-12-17 | End: 2019-12-17

## 2019-12-17 RX ADMIN — MIDAZOLAM 2 MG: 1 INJECTION INTRAMUSCULAR; INTRAVENOUS at 17:35

## 2019-12-17 RX ADMIN — MOXIFLOXACIN HYDROCHLORIDE 1 DROP: 5 SOLUTION/ DROPS OPHTHALMIC at 16:02

## 2019-12-17 RX ADMIN — PHENYLEPHRINE HYDROCHLORIDE 100 MCG: 10 INJECTION INTRAVENOUS at 18:15

## 2019-12-17 RX ADMIN — PHENYLEPHRINE HYDROCHLORIDE 1 DROP: 2.5 SOLUTION/ DROPS OPHTHALMIC at 16:02

## 2019-12-17 RX ADMIN — HYDRALAZINE HYDROCHLORIDE 5 MG: 20 INJECTION INTRAMUSCULAR; INTRAVENOUS at 19:08

## 2019-12-17 RX ADMIN — EPHEDRINE SULFATE 10 MG: 50 INJECTION INTRAMUSCULAR; INTRAVENOUS; SUBCUTANEOUS at 17:56

## 2019-12-17 RX ADMIN — CYCLOPENTOLATE HYDROCHLORIDE 1 DROP: 10 SOLUTION/ DROPS OPHTHALMIC at 16:02

## 2019-12-17 RX ADMIN — LIDOCAINE HYDROCHLORIDE 100 MG: 20 INJECTION, SOLUTION EPIDURAL; INFILTRATION; INTRACAUDAL at 17:35

## 2019-12-17 RX ADMIN — EPHEDRINE SULFATE 10 MG: 50 INJECTION INTRAMUSCULAR; INTRAVENOUS; SUBCUTANEOUS at 18:06

## 2019-12-17 RX ADMIN — HYDRALAZINE HYDROCHLORIDE 5 MG: 20 INJECTION INTRAMUSCULAR; INTRAVENOUS at 19:01

## 2019-12-17 RX ADMIN — FENTANYL CITRATE 50 MCG: 50 INJECTION, SOLUTION INTRAMUSCULAR; INTRAVENOUS at 17:35

## 2019-12-17 RX ADMIN — EPHEDRINE SULFATE 10 MG: 50 INJECTION INTRAMUSCULAR; INTRAVENOUS; SUBCUTANEOUS at 18:10

## 2019-12-17 RX ADMIN — PROPOFOL 125 MG: 10 INJECTION, EMULSION INTRAVENOUS at 17:35

## 2019-12-17 RX ADMIN — SODIUM CHLORIDE, POTASSIUM CHLORIDE, SODIUM LACTATE AND CALCIUM CHLORIDE: 600; 310; 30; 20 INJECTION, SOLUTION INTRAVENOUS at 15:00

## 2019-12-17 RX ADMIN — TROPICAMIDE 1 DROP: 10 SOLUTION/ DROPS OPHTHALMIC at 16:02

## 2019-12-17 RX ADMIN — FLURBIPROFEN SODIUM 1 DROP: 0.3 SOLUTION/ DROPS OPHTHALMIC at 16:02

## 2019-12-17 ASSESSMENT — PAIN SCALES - GENERAL: PAIN_LEVEL: 0

## 2019-12-17 NOTE — ANESTHESIA PREPROCEDURE EVALUATION
Relevant Problems   NEURO   (+) History of nasopharyngeal cancer      ENDO   (+) Hypothyroidism (acquired)       Physical Exam    Airway   Mallampati: II  TM distance: >3 FB  Neck ROM: full       Cardiovascular - normal exam  Rhythm: regular  Rate: normal  (-) murmur     Dental - normal exam         Pulmonary - normal exam  Breath sounds clear to auscultation     Abdominal    Neurological - normal exam                 Anesthesia Plan    ASA 2       Plan - general       Airway plan will be LMA        Induction: intravenous    Postoperative Plan: Postoperative administration of opioids is intended.    Pertinent diagnostic labs and testing reviewed    Informed Consent:    Anesthetic plan and risks discussed with patient.    Use of blood products discussed with: patient whom consented to blood products.

## 2019-12-18 NOTE — DISCHARGE INSTRUCTIONS
ACTIVITY: Rest and take it easy for the first 24 hours.  A responsible adult is recommended to remain with you during that time.  It is normal to feel sleepy.  We encourage you to not do anything that requires balance, judgment or coordination.    MILD FLU-LIKE SYMPTOMS ARE NORMAL. YOU MAY EXPERIENCE GENERALIZED MUSCLE ACHES, THROAT IRRITATION, HEADACHE AND/OR SOME NAUSEA.    FOR 24 HOURS DO NOT:  Drive, operate machinery or run household appliances.  Drink beer or alcoholic beverages.   Make important decisions or sign legal documents.    SPECIAL INSTRUCTIONS: *face down sleep on either side **    DIET: To avoid nausea, slowly advance diet as tolerated, avoiding spicy or greasy foods for the first day.  Add more substantial food to your diet according to your physician's instructions.  Babies can be fed formula or breast milk as soon as they are hungry.  INCREASE FLUIDS AND FIBER TO AVOID CONSTIPATION.    SURGICAL DRESSING/BATHING: *keep clean and dry keep eye patch/shield on at all times **    FOLLOW-UP APPOINTMENT:  A follow-up appointment should be arranged with your doctor tomorrow *    You should CALL YOUR PHYSICIAN if you develop:  Fever greater than 101 degrees F.  Pain not relieved by medication, or persistent nausea or vomiting.  Excessive bleeding (blood soaking through dressing) or unexpected drainage from the wound.  Extreme redness or swelling around the incision site, drainage of pus or foul smelling drainage.  Inability to urinate or empty your bladder within 8 hours.  Problems with breathing or chest pain.    You should call 911 if you develop problems with breathing or chest pain.  If you are unable to contact your doctor or surgical center, you should go to the nearest emergency room or urgent care center.  Physician's telephone #: *[phone]**    If any questions arise, call your doctor.  If your doctor is not available, please feel free to call the Surgical Center at [phone].  The  Center is open Monday through Friday from 7AM to 7PM.  You can also call the HEALTH HOTLINE open 24 hours/day, 7 days/week and speak to a nurse at [phone], or toll free at [phone].    A registered nurse may call you a few days after your surgery to see how you are doing after your procedure.    MEDICATIONS: Resume taking daily medication.  Take prescribed pain medication with food.  If no medication is prescribed, you may take non-aspirin pain medication if needed.  PAIN MEDICATION CAN BE VERY CONSTIPATING.  Take a stool softener or laxative such as senokot, pericolace, or milk of magnesia if needed.    Prescription given for *none **.  Last pain medication given at *none **.    If your physician has prescribed pain medication that includes Acetaminophen (Tylenol), do not take additional Acetaminophen (Tylenol) while taking the prescribed medication.    Depression / Suicide Risk    As you are discharged from this [facility] facility, it is important to learn how to keep safe from harming yourself.    Recognize the warning signs:  · Abrupt changes in personality, positive or negative- including increase in energy   · Giving away possessions  · Change in eating patterns- significant weight changes-  positive or negative  · Change in sleeping patterns- unable to sleep or sleeping all the time   · Unwillingness or inability to communicate  · Depression  · Unusual sadness, discouragement and loneliness  · Talk of wanting to die  · Neglect of personal appearance   · Rebelliousness- reckless behavior  · Withdrawal from people/activities they love  · Confusion- inability to concentrate     If you or a loved one observes any of these behaviors or has concerns about self-harm, here's what you can do:  · Talk about it- your feelings and reasons for harming yourself  · Remove any means that you might use to hurt yourself (examples: pills, rope, extension cords, firearm)  · Get professional help from the  community (Mental Health, Substance Abuse, psychological counseling)  · Do not be alone:Call your Safe Contact- someone whom you trust who will be there for you.  · Call your local CRISIS HOTLINE 173-9767 or 136-011-9530  · Call your local Children's Mobile Crisis Response Team [place] (340) 269-7825 or www.HomeTouch  · Call the toll free National Suicide Prevention Hotlines   · National Suicide Prevention Lifeline 676-441-BIQO (4408)  · National Hope Line Network 800-SUICIDE (785-1685)

## 2019-12-18 NOTE — ADDENDUM NOTE
Addendum  created 12/17/19 1857 by Juancarlos Mcdonough M.D.    Order list changed, Order sets accessed

## 2019-12-18 NOTE — ANESTHESIA TIME REPORT
Anesthesia Start and Stop Event Times     Date Time Event    12/17/2019 1716 Ready for Procedure     1727 Anesthesia Start     1836 Anesthesia Stop        Responsible Staff  12/17/19    Name Role Begin End    Juancarlos Mcdonough M.D. Anesth 1727 1836        Preop Diagnosis (Free Text):  Pre-op Diagnosis     VITREOMACULAR TRACTION, MACULAR HOLE        Preop Diagnosis (Codes):    Post op Diagnosis  Macular hole      Premium Reason  A. 3PM - 7AM    Comments:

## 2019-12-18 NOTE — ANESTHESIA POSTPROCEDURE EVALUATION
Patient: Yoselin Jorge    Procedure Summary     Date:  12/17/19 Room / Location:  [facility] ROOM 24 / SURGERY SAME DAY [facility]    Anesthesia Start:  1727 Anesthesia Stop:  1836    Procedure:  REMOVAL, VITREOUS BODY, POSTERIOR PORTION-MEMBRANE PEEL POSSIBLE LASER GAS OR OIL (Left Eye) Diagnosis:  (VITREOMACULAR TRACTION, MACULAR HOLE)    Surgeon:  Arlyn Dawson M.D. Responsible Provider:  Juancarlos Mcdonough M.D.    Anesthesia Type:  general ASA Status:  2          Final Anesthesia Type: general  Last vitals  BP   Blood Pressure : (!) 167/83    Temp   36.3 °C (97.4 °F)    Pulse   Pulse: 75   Resp   20    SpO2   99 %      Anesthesia Post Evaluation    Patient location during evaluation: PACU  Patient participation: complete - patient participated  Level of consciousness: awake and alert  Pain score: 0    Airway patency: patent  Anesthetic complications: no  Cardiovascular status: hemodynamically stable  Respiratory status: acceptable  Hydration status: euvolemic    PONV: none           Nurse Pain Score: 0 (NPRS)

## 2019-12-18 NOTE — OR NURSING
1834 to pacu from or awake but sleepy without airways eye patch and shield to left eye cdi. Report recvd from Dr mcdonough. Pt bp 167/83 no orders to treat at this time   No c/o pain or nausea   1849 Dr Mcdonough back to bedside bp remains elevated new orders recvd hydralazine iv   1900- medicated iv hydralazine per orders for bp 170/78  1908 bp 173/75 remedicated hydralazine per orders   2000- bp improved cont to monitor qualifies for stage 2 transferred   2100 ready to dc to home dressed iv removed all dc and positioning  instructions given   2110 assist to wheelchair escorted out to car with all belongings accomp with spouse

## 2019-12-18 NOTE — ANESTHESIA PROCEDURE NOTES
Airway  Date/Time: 12/17/2019 5:36 PM  Performed by: Juancarlos Mcdonough M.D.  Authorized by: Juancarlos Mcdonough M.D.     Location:  OR  Urgency:  Elective  Indications for Airway Management:  Anesthesia  Spontaneous Ventilation: absent    Sedation Level:  Deep  Preoxygenated: Yes    Final Airway Type:  Supraglottic airway  Final Supraglottic Airway:  Standard LMA  SGA Size:  3  Number of Attempts at Approach:  1

## 2019-12-18 NOTE — ANESTHESIA QCDR
2019 [facility] Clinical Data Registry (for Quality Improvement)     Postoperative nausea/vomiting risk protocol (Adult = 18 yrs and Pediatric 3-17 yrs)- (430 and 463)  General inhalation anesthetic (NOT TIVA) with PONV risk factors: No  Provision of anti-emetic therapy with at least 2 different classes of agents: N/A  Patient DID NOT receive anti-emetic therapy and reason is documented in Medical Record: N/A    Multimodal Pain Management- (AQI59)  Patient undergoing Elective Surgery (i.e. Outpatient, or ASC, or Prescheduled Surgery prior to Hospital Admission): No  Use of Multimodal Pain Management, two or more drugs and/or interventions, NOT including systemic opioids: N/A  Exception: Documented allergy to multiple classes of analgesics: N/A    PACU assessment of acute postoperative pain prior to Anesthesia Care End- Applies to Patients Age = 18- (ABG7)  Initial PACU pain score is which of the following: < 7/10  Patient unable to report pain score: N/A    Post-anesthetic transfer of care checklist/protocol to PACU/ICU- (426 and 427)  Upon conclusion of case, patient transferred to which of the following locations: PACU/Non-ICU  Use of transfer checklist/protocol: Yes  Exclusion: Service Performed in Patient Hospital Room (and thus did not require transfer): N/A    PACU Reintubation- (AQI31)  General anesthesia requiring endotracheal intubation (ETT) along with subsequent extubation in OR or PACU: No  Required reintubation in the PACU: N/A  Extubation was a planned trial documented in the medical record prior to removal of the original airway device: N/A    Unplanned admission to ICU related to anesthesia service up through end of PACU care- (MD51)  Unplanned admission to ICU (not initially anticipated at anesthesia start time): No

## 2019-12-18 NOTE — ANESTHESIA POSTPROCEDURE EVALUATION
Patient: Yoselin Jorge    Procedure Summary     Date:  12/17/19 Room / Location:  [facility] ROOM 24 / SURGERY SAME DAY [facility]    Anesthesia Start:  1727 Anesthesia Stop:  1836    Procedure:  REMOVAL, VITREOUS BODY, POSTERIOR PORTION-MEMBRANE PEEL POSSIBLE LASER GAS OR OIL (Left Eye) Diagnosis:  (VITREOMACULAR TRACTION, MACULAR HOLE)    Surgeon:  Arlyn Dawson M.D. Responsible Provider:  Juancarlos Mcdonough M.D.    Anesthesia Type:  general ASA Status:  2          Final Anesthesia Type: general  Last vitals  BP   Blood Pressure : (!) 167/83    Temp   36.3 °C (97.4 °F)    Pulse   Pulse: 75   Resp   20    SpO2   99 %      Anesthesia Post Evaluation    Patient location during evaluation: PACU  Patient participation: complete - patient participated  Level of consciousness: awake and alert  Pain score: 0    Airway patency: patent  Anesthetic complications: no  Cardiovascular status: hemodynamically stable  Respiratory status: acceptable  Hydration status: euvolemic    PONV: none           Nurse Pain Score: 0 (NPRS)

## 2019-12-31 NOTE — OP REPORT
DATE OF SERVICE:  12/17/2019    SURGEON:  Arlyn Dawson MD    PREOPERATIVE DIAGNOSIS:  Macular hole, left eye.    POSTOPERATIVE DIAGNOSIS:  Macular hole, left eye.    PROCEDURE PERFORMED:  A 23-gauge pars plana vitrectomy, internal limiting   membrane peel, 20% SF6, left eye.    COMPLICATIONS:  None.    INDICATIONS:  The patient has decreased vision due to macular hole.  Risks,   benefits, and alternatives of surgery were discussed with the patient.  The   patient consented for the procedure.    DETAILS OF THE PROCEDURE:  The correct eye was marked in the preoperative   area.  The patient was taken to the operating room.   General anesthesia   induced by anesthesia.  The patient was prepped and draped in sterile   ophthalmic fashion.  Site was marked 3 mm from the limbus in the   inferotemporal quadrant.  A 23-gauge trocar was used in a beveled fashion to   enter the vitreous cavity.  Infusion was placed in the eye, visualized with   the light pipe and turned on.  One site superonasally and another site   superotemporally were then marked 3 mm from the limbus.  A 23-gauge trocar in   a beveled fashion to enter the vitreous cavity.  Light pipe and vitrector were   inserted inside the eye.  We performed good core and peripheral vitrectomy to   remove the vitreous.  We paid attention to the macula.  We used 23-gauge   internal membrane forceps to peel the internal limiting membrane around the   macular hole.  Scleral depression was then performed.  There were no   iatrogenic breaks or tears.  Air-fluid exchange was then performed.  A 20% SF6   was placed in the eye.  The trocars were then removed.  There was no leak.    Postop Ancef and dexamethasone were injected subconjunctivally.  Drapes were   then removed.  The patient's face was cleaned, ointment applied to the eye.    Eye was patched and shielded.  The patient was taken to the recovery room in   good condition.  There were no complications.        ____________________________________     MD ABHISHEK ZAMORA / HUMPHREY    DD:  12/31/2019 12:24:55  DT:  12/31/2019 12:37:24    D#:  [id number]  Job#:  [id number]

## 2020-02-05 ENCOUNTER — PATIENT OUTREACH (OUTPATIENT)
Dept: HEALTH INFORMATION MANAGEMENT | Facility: OTHER | Age: 75
End: 2020-02-05

## 2020-02-05 NOTE — PROGRESS NOTES
Pt called regarding claim # [id number], pt stated that she is getting a bill from [facility] for $700.00, I show no responsibility for that claim called [facility] and spoke to the billing dept who stated that claim should have modifier 59, I advised modifier is 51. Rep stated that she will resubmit claim. Called pt back and advised. Sent message to ERIK Lowe.

## 2020-02-11 ENCOUNTER — APPOINTMENT (OUTPATIENT)
Dept: DERMATOLOGY | Facility: IMAGING CENTER | Age: 75
End: 2020-02-11

## 2020-05-20 ENCOUNTER — PATIENT OUTREACH (OUTPATIENT)
Dept: HEALTH INFORMATION MANAGEMENT | Facility: OTHER | Age: 75
End: 2020-05-20

## 2020-05-20 NOTE — PROGRESS NOTES
Outcome: Left Message AWV / AHA call      Please transfer to Patient Outreach Team at [phone] when patient returns call.      [facility] Verified: yes    Attempt # 1

## 2020-06-26 NOTE — PROGRESS NOTES
Outcome: Left Message  AWV / AHA call      Please transfer to Patient Outreach Team at [phone] when patient returns call.        [facility] Verified: yes    Attempt # 2

## 2020-06-30 RX ORDER — AMITRIPTYLINE HYDROCHLORIDE 25 MG/1
TABLET, FILM COATED ORAL
Qty: 90 TAB | Refills: 0 | Status: SHIPPED | OUTPATIENT
Start: 2020-06-30 | End: 2020-10-15 | Stop reason: SDUPTHER

## 2020-07-01 ENCOUNTER — TELEPHONE (OUTPATIENT)
Dept: HEALTH INFORMATION MANAGEMENT | Facility: OTHER | Age: 75
End: 2020-07-01

## 2020-07-01 DIAGNOSIS — E03.9 HYPOTHYROIDISM (ACQUIRED): ICD-10-CM

## 2020-07-01 DIAGNOSIS — R73.03 PREDIABETES: ICD-10-CM

## 2020-07-01 DIAGNOSIS — E78.00 PURE HYPERCHOLESTEROLEMIA: ICD-10-CM

## 2020-07-01 NOTE — TELEPHONE ENCOUNTER
Hello!  Patient is wondering if you can order her routine lab work? She is scheduled to come in July 27th for her AWV.   Thank you  Precious UMANA

## 2020-07-01 NOTE — TELEPHONE ENCOUNTER
1. Caller Name: Yoselin Jorge             Call Back Number: [phone]  [facility] PCP or Specialty Provider: Yes Dr. Kathy Soto        2.  In the last two weeks, has the patient had any new or worsening symptoms (not explained by alternative diagnosis)? No.    3.  Does patient have any comoribidities? None Cancer survivor    4.  Has the patient traveled in the last 14 days OR had any known contact with someone who is suspected or confirmed to have COVID-19?  Yes, traveled [place] to [place], arrived [place] 6/25/2020    5. POTENTIAL PUI (LOW): Advised to perform self care, monitor for worsening symptoms and to call back in 3 days if no improvement. Instructed to self isolate and contact [facility] at [phone]         Note routed to [facility] Provider: SALAZAR only.

## 2020-07-06 RX ORDER — LEVOTHYROXINE SODIUM 112 UG/1
TABLET ORAL
COMMUNITY
Start: 2020-06-05 | End: 2020-09-29

## 2020-07-17 ENCOUNTER — HOSPITAL ENCOUNTER (OUTPATIENT)
Dept: LAB | Facility: MEDICAL CENTER | Age: 75
End: 2020-07-17
Attending: FAMILY MEDICINE
Payer: MEDICARE

## 2020-07-17 DIAGNOSIS — E03.9 HYPOTHYROIDISM (ACQUIRED): ICD-10-CM

## 2020-07-17 DIAGNOSIS — E78.00 PURE HYPERCHOLESTEROLEMIA: ICD-10-CM

## 2020-07-17 DIAGNOSIS — R73.03 PREDIABETES: ICD-10-CM

## 2020-07-17 LAB
ALBUMIN SERPL BCP-MCNC: 4 G/DL (ref 3.2–4.9)
ALBUMIN/GLOB SERPL: 1.5 G/DL
ALP SERPL-CCNC: 67 U/L (ref 30–99)
ALT SERPL-CCNC: 15 U/L (ref 2–50)
ANION GAP SERPL CALC-SCNC: 11 MMOL/L (ref 7–16)
AST SERPL-CCNC: 12 U/L (ref 12–45)
BASOPHILS # BLD AUTO: 0.8 % (ref 0–1.8)
BASOPHILS # BLD: 0.04 K/UL (ref 0–0.12)
BILIRUB SERPL-MCNC: 0.2 MG/DL (ref 0.1–1.5)
BUN SERPL-MCNC: 12 MG/DL (ref 8–22)
CALCIUM SERPL-MCNC: 9.2 MG/DL (ref 8.5–10.5)
CHLORIDE SERPL-SCNC: 106 MMOL/L (ref 96–112)
CHOLEST SERPL-MCNC: 168 MG/DL (ref 100–199)
CO2 SERPL-SCNC: 23 MMOL/L (ref 20–33)
CREAT SERPL-MCNC: 0.58 MG/DL (ref 0.5–1.4)
EOSINOPHIL # BLD AUTO: 0.08 K/UL (ref 0–0.51)
EOSINOPHIL NFR BLD: 1.6 % (ref 0–6.9)
ERYTHROCYTE [DISTWIDTH] IN BLOOD BY AUTOMATED COUNT: 50.3 FL (ref 35.9–50)
EST. AVERAGE GLUCOSE BLD GHB EST-MCNC: 123 MG/DL
FASTING STATUS PATIENT QL REPORTED: NORMAL
GLOBULIN SER CALC-MCNC: 2.6 G/DL (ref 1.9–3.5)
GLUCOSE SERPL-MCNC: 99 MG/DL (ref 65–99)
HBA1C MFR BLD: 5.9 % (ref 0–5.6)
HCT VFR BLD AUTO: 42.7 % (ref 37–47)
HDLC SERPL-MCNC: 65 MG/DL
HGB BLD-MCNC: 13.5 G/DL (ref 12–16)
IMM GRANULOCYTES # BLD AUTO: 0.01 K/UL (ref 0–0.11)
IMM GRANULOCYTES NFR BLD AUTO: 0.2 % (ref 0–0.9)
LDLC SERPL CALC-MCNC: 89 MG/DL
LYMPHOCYTES # BLD AUTO: 1.17 K/UL (ref 1–4.8)
LYMPHOCYTES NFR BLD: 23.5 % (ref 22–41)
MCH RBC QN AUTO: 28.2 PG (ref 27–33)
MCHC RBC AUTO-ENTMCNC: 31.6 G/DL (ref 33.6–35)
MCV RBC AUTO: 89.1 FL (ref 81.4–97.8)
MONOCYTES # BLD AUTO: 0.35 K/UL (ref 0–0.85)
MONOCYTES NFR BLD AUTO: 7 % (ref 0–13.4)
NEUTROPHILS # BLD AUTO: 3.33 K/UL (ref 2–7.15)
NEUTROPHILS NFR BLD: 66.9 % (ref 44–72)
NRBC # BLD AUTO: 0 K/UL
NRBC BLD-RTO: 0 /100 WBC
PLATELET # BLD AUTO: 341 K/UL (ref 164–446)
PMV BLD AUTO: 10.2 FL (ref 9–12.9)
POTASSIUM SERPL-SCNC: 4.6 MMOL/L (ref 3.6–5.5)
PROT SERPL-MCNC: 6.6 G/DL (ref 6–8.2)
RBC # BLD AUTO: 4.79 M/UL (ref 4.2–5.4)
SODIUM SERPL-SCNC: 140 MMOL/L (ref 135–145)
T4 FREE SERPL-MCNC: 1.42 NG/DL (ref 0.93–1.7)
TRIGL SERPL-MCNC: 70 MG/DL (ref 0–149)
TSH SERPL DL<=0.005 MIU/L-ACNC: 0.86 UIU/ML (ref 0.38–5.33)
WBC # BLD AUTO: 5 K/UL (ref 4.8–10.8)

## 2020-07-17 PROCEDURE — 84439 ASSAY OF FREE THYROXINE: CPT

## 2020-07-17 PROCEDURE — 80053 COMPREHEN METABOLIC PANEL: CPT

## 2020-07-17 PROCEDURE — 80061 LIPID PANEL: CPT

## 2020-07-17 PROCEDURE — 83036 HEMOGLOBIN GLYCOSYLATED A1C: CPT

## 2020-07-17 PROCEDURE — 36415 COLL VENOUS BLD VENIPUNCTURE: CPT

## 2020-07-17 PROCEDURE — 85025 COMPLETE CBC W/AUTO DIFF WBC: CPT

## 2020-07-17 PROCEDURE — 84443 ASSAY THYROID STIM HORMONE: CPT

## 2020-07-27 ENCOUNTER — OFFICE VISIT (OUTPATIENT)
Dept: MEDICAL GROUP | Facility: PHYSICIAN GROUP | Age: 75
End: 2020-07-27
Payer: MEDICARE

## 2020-07-27 VITALS
HEART RATE: 63 BPM | HEIGHT: 63 IN | DIASTOLIC BLOOD PRESSURE: 62 MMHG | OXYGEN SATURATION: 97 % | BODY MASS INDEX: 28.39 KG/M2 | SYSTOLIC BLOOD PRESSURE: 124 MMHG | TEMPERATURE: 96.9 F | WEIGHT: 160.2 LBS

## 2020-07-27 DIAGNOSIS — Z78.0 MENOPAUSE: ICD-10-CM

## 2020-07-27 DIAGNOSIS — H91.93 BILATERAL HEARING LOSS, UNSPECIFIED HEARING LOSS TYPE: ICD-10-CM

## 2020-07-27 DIAGNOSIS — E78.5 HYPERLIPIDEMIA, UNSPECIFIED HYPERLIPIDEMIA TYPE: ICD-10-CM

## 2020-07-27 DIAGNOSIS — E78.00 PURE HYPERCHOLESTEROLEMIA: ICD-10-CM

## 2020-07-27 DIAGNOSIS — H35.3221 EXUDATIVE AGE-RELATED MACULAR DEGENERATION OF LEFT EYE WITH ACTIVE CHOROIDAL NEOVASCULARIZATION (HCC): ICD-10-CM

## 2020-07-27 DIAGNOSIS — Z23 NEED FOR VACCINATION: ICD-10-CM

## 2020-07-27 DIAGNOSIS — R73.03 PREDIABETES: ICD-10-CM

## 2020-07-27 DIAGNOSIS — E03.9 HYPOTHYROIDISM (ACQUIRED): ICD-10-CM

## 2020-07-27 DIAGNOSIS — M85.80 OSTEOPENIA, UNSPECIFIED LOCATION: ICD-10-CM

## 2020-07-27 PROBLEM — R10.13 EPIGASTRIC ABDOMINAL PAIN OF UNKNOWN ETIOLOGY: Status: RESOLVED | Noted: 2019-07-12 | Resolved: 2020-07-27

## 2020-07-27 PROCEDURE — 90750 HZV VACC RECOMBINANT IM: CPT | Performed by: FAMILY MEDICINE

## 2020-07-27 PROCEDURE — 8041 PR SCP AHA: Performed by: FAMILY MEDICINE

## 2020-07-27 PROCEDURE — 90471 IMMUNIZATION ADMIN: CPT | Performed by: FAMILY MEDICINE

## 2020-07-27 PROCEDURE — G0439 PPPS, SUBSEQ VISIT: HCPCS | Performed by: FAMILY MEDICINE

## 2020-07-27 PROCEDURE — 99999 PR NO CHARGE: CPT | Performed by: FAMILY MEDICINE

## 2020-07-27 RX ORDER — SIMVASTATIN 20 MG
20 TABLET ORAL EVERY EVENING
Qty: 90 TAB | Refills: 3 | Status: SHIPPED | OUTPATIENT
Start: 2020-07-27 | End: 2020-10-02 | Stop reason: SDUPTHER

## 2020-07-27 ASSESSMENT — FIBROSIS 4 INDEX: FIB4 SCORE: 0.67

## 2020-07-27 ASSESSMENT — ENCOUNTER SYMPTOMS: GENERAL WELL-BEING: GOOD

## 2020-07-27 ASSESSMENT — ACTIVITIES OF DAILY LIVING (ADL): BATHING_REQUIRES_ASSISTANCE: 0

## 2020-07-27 ASSESSMENT — PATIENT HEALTH QUESTIONNAIRE - PHQ9: CLINICAL INTERPRETATION OF PHQ2 SCORE: 0

## 2020-07-27 NOTE — ASSESSMENT & PLAN NOTE
This is a chronic condition for this patient that is managed by Dr. Piyush OLIVEIRA.  Patient doing well.

## 2020-07-27 NOTE — ASSESSMENT & PLAN NOTE
This is a chronic health problem for this patient and she is due for bone density test.  We will plan on going over those results when we see her back in 6 months.

## 2020-07-27 NOTE — ASSESSMENT & PLAN NOTE
This is a chronic health problem that is well controlled with current medications and lifestyle measures.She is on Levothyroxine 112 mcg and her numbers are excellent with her TSH at 0.865 and her T4 is good at 1.42.  She will continue on that dose long-term.

## 2020-07-27 NOTE — ASSESSMENT & PLAN NOTE
This is a chronic health problem for this patient where she did spend a month with a 15-year-old and was probably eating the same as she was.  Her fasting glucose is great at 99 but her A1c was up slightly at 5.9.  We will keep an eye on this every 6 months overall she is doing very well.

## 2020-07-27 NOTE — ASSESSMENT & PLAN NOTE
Your total cholesterol is well controlled on simvastatin 20 mg daily.  Her total cholesterol was 168, triglycerides good at 70, HDL excellent at 65 LDL was good at 89.  There is no liver dysfunction.  She will continue with current meds.

## 2020-07-27 NOTE — PROGRESS NOTES
Chief Complaint   Patient presents with   • Annual Exam         HPI:  Yoselin Ackerman is a 75 y.o. here for Medicare Annual Wellness Visit and her [facility] annual health assessment.  Patient reports she is doing very well.  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        Patient Active Problem List    Diagnosis Date Noted   • Exudative age-related macular degeneration of left eye with active choroidal neovascularization (HCC) 07/27/2020   • Menopause 07/27/2020   • Prediabetes 05/11/2017   • S/P thyroidectomy 05/22/2015   • S/P dilatation of esophageal stricture 05/16/2015   • Swallowing difficulty 01/13/2015   • Hypothyroidism (acquired) 01/13/2015   • Osteopenia 03/30/2013   • Preventative health care 03/17/2013   • Hearing loss 03/17/2013   • Hyperlipidemia 03/11/2013   • History of nasopharyngeal cancer 03/11/2013   • Family history of melanoma 03/11/2013       Current Outpatient Medications   Medication Sig Dispense Refill   • simvastatin (ZOCOR) 20 MG Tab Take 1 Tab by mouth every evening. 90 Tab 3   • levothyroxine (SYNTHROID) 112 MCG Tab      • amitriptyline (ELAVIL) 25 MG Tab TAKE ONE TABLET BY MOUTH AT BEDTIME AS NEEDED  90 Tab 0   • Probiotic Product (PROBIOTIC DAILY PO) Take  by mouth.     • Multiple Vitamins-Minerals (WOMENS BONE HEALTH PO) Take  by mouth.       No current facility-administered medications for this visit.         Patient is taking medications as noted in medication list.  Current supplements as per medication list.     Allergies: Patient has no known allergies.    Current social contact/activities: working part time 3 days per week, socializes with  and close friends.     Is patient current with immunizations? No, due for SHINGRIX (Shingles). Patient is interested in receiving SHINGRIX (Shingles) today.    She  reports that she has never smoked. She has never used smokeless tobacco. She reports current alcohol use of about 0.6 oz of alcohol per  week. She reports that she does not use drugs.  Counseling given: Not Answered        DPA/Advanced directive: Patient has Living Will, but it is not on file. Instructed to bring in a copy to scan into their chart.    ROS:    Gait: Uses no assistive device   Ostomy: No   Other tubes: No   Amputations: No   Chronic oxygen use No   Last eye exam upcoming eye exsam   Wears hearing aids: Yes   : Denies any urinary leakage during the last 6 months      Screening:        Depression Screening    Little interest or pleasure in doing things?  0 - not at all  Feeling down, depressed, or hopeless? 0 - not at all  Patient Health Questionnaire Score: 0    If depressive symptoms identified deferred to follow up visit unless specifically addressed in assessment and plan.    Interpretation of PHQ-9 Total Score   Score Severity   1-4 No Depression   5-9 Mild Depression   10-14 Moderate Depression   15-19 Moderately Severe Depression   20-27 Severe Depression    Screening for Cognitive Impairment    Three Minute Recall (river, nation, finger)  3/3    Zach clock face with all 12 numbers and set the hands to show 10 past 11.  Yes    If cognitive concerns identified, deferred for follow up unless specifically addressed in assessment and plan.    Fall Risk Assessment    Has the patient had two or more falls in the last year or any fall with injury in the last year?  No  If fall risk identified, deferred for follow up unless specifically addressed in assessment and plan.    Safety Assessment    Throw rugs on floor.  No  Handrails on all stairs.  No  Good lighting in all hallways.  Yes  Difficulty hearing.  Yes  Patient counseled about all safety risks that were identified.    Functional Assessment ADLs    Are there any barriers preventing you from cooking for yourself or meeting nutritional needs?  No.    Are there any barriers preventing you from driving safely or obtaining transportation?  No.    Are there any barriers preventing you  from using a telephone or calling for help?  No.    Are there any barriers preventing you from shopping?  No.    Are there any barriers preventing you from taking care of your own finances?  No.    Are there any barriers preventing you from managing your medications?  No.    Are there any barriers preventing you from showering, bathing or dressing yourself?  No.    Are you currently engaging in any exercise or physical activity?  Yes.  Walking   What is your perception of your health?  Good.    Health Maintenance Summary                IMM DTaP/Tdap/Td Vaccine Overdue 10/27/2019      Done 10/27/2009 Imm Admin: Tdap Vaccine    BONE DENSITY Overdue 11/14/2019      Done 11/14/2017 DS-BONE DENSITY STUDY (DEXA)     Patient has more history with this topic...    IMM ZOSTER VACCINES Overdue 4/15/2020      Done 2/19/2020 Imm Admin: Recombinant Zoster Vaccine (RZV) (Shingrix)     Patient has more history with this topic...    MAMMOGRAM Overdue 5/20/2020      Done 5/20/2019 MA-SCREENING MAMMO BILAT W/TOMOSYNTHESIS W/CAD     Patient has more history with this topic...    Annual Wellness Visit Overdue 7/12/2020      Done 7/12/2019 SUBSEQUENT ANNUAL WELLNESS VISIT-INCLUDES PPPS ()     Patient has more history with this topic...    IMM INFLUENZA Next Due 9/1/2020      Done 10/2/2019 Imm Admin: Influenza Vac Subunit Quad Inj (Pf)     Patient has more history with this topic...    COLONOSCOPY Next Due 1/5/2026      Done 1/5/2016 AMB REFERRAL TO GI FOR COLONOSCOPY          Patient Care Team:  Kathy Soto M.D. as PCP - General (Family Medicine)  KAREEM Guido as Mid Level Provider (Dermatology)  Slcik Pickett O.D. as Consulting Physician (Optometry)  Raimundo Love DDS as Consulting Physician (Dentistry)  Britt Benjamin M.D. as Consulting Physician (Endocrinology)  Jorge L Weinstein as Consulting Physician (Orthopaedics)  Mitchel Manuel M.D. as Consulting Physician (Otolaryngology)  [facility] Lopez [facility]  as [facility]   Arlyn Dawson M.D. as Consulting Physician (Ophthalmology)    Social History     Tobacco Use   • Smoking status: Never Smoker   • Smokeless tobacco: Never Used   Substance Use Topics   • Alcohol use: Yes     Alcohol/week: 0.6 oz     Types: 1 Glasses of wine per week     Comment: on rare ocassion   • Drug use: No     Family History   Problem Relation Age of Onset   • Diabetes Father    • Hypertension Father    • Hyperlipidemia Father    • Stroke Father    • Lung Disease Mother 68        Emphazema   • Heart Disease Mother         CHF   • Cancer Brother 55        liver cancer   • Other Brother         Melanoma   • Cancer Brother 55        Prostate cancer   • Heart Disease Maternal Grandmother 63     She  has a past medical history of Cancer (HCC) (2007), Epigastric abdominal pain of unknown etiology (7/12/2019), Exudative age-related macular degeneration of left eye with active choroidal neovascularization (HCC) (7/27/2020), Family history of melanoma (3/11/2013), Flat foot(734) (3/17/2013), Heart burn, Herniated cervical disc, 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   Past Surgical History:   Procedure Laterality Date   • VITRECTOMY POSTERIOR Left 12/17/2019    Procedure: REMOVAL, VITREOUS BODY, POSTERIOR PORTION-MEMBRANE PEEL POSSIBLE LASER GAS OR OIL;  Surgeon: Arlyn Dawson M.D.;  Location: SURGERY SAME DAY [facility] ORS;  Service: Ophthalmology   • THYROIDECTOMY TOTAL  11/19/2014    Performed by Lukas De Santiago M.D. at SURGERY SAME DAY [facility] ORS   • BICEPS TENODESIS  " 5/22/2014    Performed by Cain Gerardo M.D. at SURGERY [facility]   • SHOULDER ARTHROSCOPY W/ ROTATOR CUFF REPAIR  5/22/2014    Performed by Cain Gerardo M.D. at [facility]   • SHOULDER DECOMPRESSION ARTHROSCOPIC  5/22/2014    Performed by Cain Gerardo M.D. at [facility]   • CLAVICLE DISTAL EXCISION  5/22/2014    Performed by Cain Gerardo M.D. at [facility]   • TENDON RELEASE FOOT  August 2013    [email or web address]    • OTHER ORTHOPEDIC SURGERY  2006    reconstruction  left foot   • BUNIONECTOMY  1988   • CHOLECYSTECTOMY  1988   • TUBAL LIGATION  1982   • CATARACT PHACO WITH IOL      Ry   • GYN SURGERY      tubal   • TONSILLECTOMY     • US-NEEDLE CORE BX-BREAST PANEL             Exam:     /62 (BP Location: Left arm, Patient Position: Sitting, BP Cuff Size: Adult)   Pulse 63   Temp 36.1 °C (96.9 °F) (Temporal)   Ht 1.6 m (5' 3\")   Wt 72.7 kg (160 lb 3.2 oz)   SpO2 97%  Body mass index is 28.38 kg/m².    Hearing excellent.    Dentition good  Alert, oriented in no acute distress.  Eye contact is good, speech goal directed, affect calm      Assessment and Plan. The following treatment and monitoring plan is recommended:    Exudative age-related macular degeneration of left eye with active choroidal neovascularization (HCC)  This is a chronic condition for this patient that is managed by Dr. Piyush OLIVEIRA.  Patient doing well.    Hearing loss  Pt wears bilateral hearing aids.    Hyperlipidemia  Your total cholesterol is well controlled on simvastatin 20 mg daily.  Her total cholesterol was 168, triglycerides good at 70, HDL excellent at 65 LDL was good at 89.  There is no liver dysfunction.  She will continue with current meds.    Hypothyroidism (acquired)  This is a chronic health problem that is well controlled with current medications and lifestyle measures.She is on Levothyroxine 112 mcg and her numbers are excellent with her TSH at 0.865 and her T4 is good at 1.42.  " She will continue on that dose long-term.    Prediabetes  This is a chronic health problem for this patient where she did spend a month with a 15-year-old and was probably eating the same as she was.  Her fasting glucose is great at 99 but her A1c was up slightly at 5.9.  We will keep an eye on this every 6 months overall she is doing very well.    Menopause  Patient went through natural menopause and is due for bone density testing.  She already has her mammogram scheduled for 8/27/2020.  I will order the bone density and she can call and see if she can get it at the same time.    Osteopenia  This is a chronic health problem for this patient and she is due for bone density test.  We will plan on going over those results when we see her back in 6 months.        Services suggested: No services needed at this time  Health Care Screening recommendations as per orders if indicated.  Referrals offered: PT/OT/Nutrition counseling/Behavioral Health/Smoking cessation as per orders if indicated.    Discussion today about general wellness and lifestyle habits:    · Prevent falls and reduce trip hazards; Cautioned about securing or removing rugs.  · Have a working fire alarm and carbon monoxide detector;   · Engage in regular physical activity and social activities   Annual Health Assessment Questions:    1.  Are you currently engaging in any exercise or physical activity? Yes    2.  How would you describe your mood or emotional well-being today? good    3.  Have you had any falls in the last year? No    4.  Have you noticed any problems with your balance or had difficulty walking? No    5.  In the last six months have you experienced any leakage of urine? No    6. DPA/Advanced Directive: Patient has Living Will, but it is not on file. Instructed to bring in a copy to scan into their chart.    Follow-up: No follow-ups on file.

## 2020-07-27 NOTE — ASSESSMENT & PLAN NOTE
Patient went through natural menopause and is due for bone density testing.  She already has her mammogram scheduled for 8/27/2020.  I will order the bone density and she can call and see if she can get it at the same time.

## 2020-08-27 ENCOUNTER — HOSPITAL ENCOUNTER (OUTPATIENT)
Dept: RADIOLOGY | Facility: MEDICAL CENTER | Age: 75
End: 2020-08-27
Attending: FAMILY MEDICINE
Payer: MEDICARE

## 2020-08-27 DIAGNOSIS — Z78.0 MENOPAUSE: ICD-10-CM

## 2020-08-27 DIAGNOSIS — Z12.31 VISIT FOR SCREENING MAMMOGRAM: ICD-10-CM

## 2020-08-27 PROCEDURE — 77080 DXA BONE DENSITY AXIAL: CPT

## 2020-08-27 PROCEDURE — 77067 SCR MAMMO BI INCL CAD: CPT

## 2020-09-03 ENCOUNTER — OFFICE VISIT (OUTPATIENT)
Dept: MEDICAL GROUP | Facility: PHYSICIAN GROUP | Age: 75
End: 2020-09-03
Payer: MEDICARE

## 2020-09-03 VITALS
OXYGEN SATURATION: 97 % | SYSTOLIC BLOOD PRESSURE: 120 MMHG | HEART RATE: 74 BPM | BODY MASS INDEX: 29.95 KG/M2 | HEIGHT: 63 IN | DIASTOLIC BLOOD PRESSURE: 70 MMHG | RESPIRATION RATE: 14 BRPM | WEIGHT: 169 LBS | TEMPERATURE: 97.3 F

## 2020-09-03 DIAGNOSIS — M85.80 OSTEOPENIA, UNSPECIFIED LOCATION: ICD-10-CM

## 2020-09-03 PROCEDURE — 99213 OFFICE O/P EST LOW 20 MIN: CPT | Performed by: FAMILY MEDICINE

## 2020-09-03 ASSESSMENT — FIBROSIS 4 INDEX: FIB4 SCORE: 0.67

## 2020-09-04 ENCOUNTER — HOSPITAL ENCOUNTER (OUTPATIENT)
Dept: LAB | Facility: MEDICAL CENTER | Age: 75
End: 2020-09-04
Attending: FAMILY MEDICINE
Payer: MEDICARE

## 2020-09-04 DIAGNOSIS — M85.80 OSTEOPENIA, UNSPECIFIED LOCATION: ICD-10-CM

## 2020-09-04 LAB — CA-I SERPL-SCNC: 1.3 MMOL/L (ref 1.1–1.3)

## 2020-09-04 PROCEDURE — 82330 ASSAY OF CALCIUM: CPT

## 2020-09-04 PROCEDURE — 36415 COLL VENOUS BLD VENIPUNCTURE: CPT

## 2020-09-14 ENCOUNTER — PATIENT MESSAGE (OUTPATIENT)
Dept: MEDICAL GROUP | Facility: PHYSICIAN GROUP | Age: 75
End: 2020-09-14

## 2020-09-16 NOTE — ASSESSMENT & PLAN NOTE
This is a chronic health problem for this patient that we had her get a DEXA scan which was accomplished on 8/27/2020.  At that time her findings show osteopenic bone mineral density in both the lumbar spine and left femur.  Lumbar spine has a score of -1.1 and the femur has a score of -1.0.  Patient has previously had Reclast last being given to her in February 2014.  Her ionized calcium is in the normal range.  We are going to have her continue with calcium replacement and weightbearing exercise and recheck her DEXA scan in 2 years.

## 2020-09-16 NOTE — PROGRESS NOTES
CC:The encounter diagnosis was Osteopenia, unspecified location.    HISTORY OF PRESENT ILLNESS: Patient is a 75 y.o. female established patient who presents today to talk about her osteopenia.      Osteopenia  This is a chronic health problem for this patient that we had her get a DEXA scan which was accomplished on 8/27/2020.  At that time her findings show osteopenic bone mineral density in both the lumbar spine and left femur.  Lumbar spine has a score of -1.1 and the femur has a score of -1.0.  Patient has previously had Reclast last being given to her in February 2014.  Her ionized calcium is in the normal range.  We are going to have her continue with calcium replacement and weightbearing exercise and recheck her DEXA scan in 2 years.    Patient Active Problem List    Diagnosis Date Noted   • Exudative age-related macular degeneration of left eye with active choroidal neovascularization (HCC) 07/27/2020   • Menopause 07/27/2020   • Prediabetes 05/11/2017   • S/P thyroidectomy 05/22/2015   • S/P dilatation of esophageal stricture 05/16/2015   • Swallowing difficulty 01/13/2015   • Hypothyroidism (acquired) 01/13/2015   • Osteopenia 03/30/2013   • Preventative health care 03/17/2013   • Hearing loss 03/17/2013   • Hyperlipidemia 03/11/2013   • History of nasopharyngeal cancer 03/11/2013   • Family history of melanoma 03/11/2013      Allergies:Patient has no known allergies.    Current Outpatient Medications   Medication Sig Dispense Refill   • simvastatin (ZOCOR) 20 MG Tab Take 1 Tab by mouth every evening. 90 Tab 3   • levothyroxine (SYNTHROID) 112 MCG Tab      • amitriptyline (ELAVIL) 25 MG Tab TAKE ONE TABLET BY MOUTH AT BEDTIME AS NEEDED  90 Tab 0   • Probiotic Product (PROBIOTIC DAILY PO) Take  by mouth.     • Multiple Vitamins-Minerals (WOMENS BONE HEALTH PO) Take  by mouth.       No current facility-administered medications for this visit.        Social History     Tobacco Use   • Smoking status: Never  Smoker   • Smokeless tobacco: Never Used   Substance Use Topics   • Alcohol use: Yes     Alcohol/week: 0.6 oz     Types: 1 Glasses of wine per week     Comment: on rare ocassion   • Drug use: No     Social History     Social History Narrative    Retired Sales.    . 2 children.    Moved from [place] to [place] 2011.        Family History   Problem Relation Age of Onset   • Diabetes Father    • Hypertension Father    • Hyperlipidemia Father    • Stroke Father    • Lung Disease Mother 68        Emphazema   • Heart Disease Mother         CHF   • Cancer Brother 55        liver cancer   • Other Brother         Melanoma   • Cancer Brother 55        Prostate cancer   • Heart Disease Maternal Grandmother 63        ROS:     - Constitutional:  Negative for fever, chills, unexpected weight change, and fatigue/generalized weakness.    - HEENT:  Negative for headaches, vision changes, hearing changes, ear pain, ear discharge, rhinorrhea, sinus congestion, sore throat, and neck pain.      - Respiratory: Negative for cough, sputum production, chest congestion, dyspnea, wheezing, and crackles.      - Cardiovascular: Negative for chest pain, palpitations, orthopnea, and bilateral lower extremity edema.     - Gastrointestinal: Negative for heartburn, nausea, vomiting, abdominal pain, hematochezia, melena, diarrhea, constipation, and greasy/foul-smelling stools.     - Genitourinary: Negative for dysuria, polyuria, hematuria, pyuria, urinary urgency, and urinary incontinence.     - Musculoskeletal: Negative for myalgias, back pain, and joint pain.     - Skin: Negative for rash, itching, cyanotic skin color change.     - Neurological: Negative for dizziness, tingling, tremors, focal sensory deficit, focal weakness and headaches.     - Endo/Heme/Allergies: Does not bruise/bleed easily.     - Psychiatric/Behavioral: Negative for depression, suicidal/homicidal ideation and memory loss.          - NOTE: All other systems reviewed and  "are negative, except as in HPI.      Exam:    /70 (BP Location: Left arm, Patient Position: Sitting, BP Cuff Size: Adult)   Pulse 74   Temp 36.3 °C (97.3 °F) (Temporal)   Resp 14   Ht 1.6 m (5' 3\")   Wt 76.7 kg (169 lb)   SpO2 97%  Body mass index is 29.94 kg/m².    General:  Well nourished, well developed female in NAD  Head is grossly normal.    Patient was seen for 20 minutes face to face of which, 15 minutes was spent counseling regarding discussion of her DEXA scan and how her osteoporosis was treated in the past that has now become osteopenia..    Please note that this dictation was created using voice recognition software. I have made every reasonable attempt to correct obvious errors, but I expect that there are errors of grammar and possibly content that I did not discover before finalizing the note.    Assessment/Plan:  1. Osteopenia, unspecified location  Patient's DEXA scan shows continued improvement.  We will recheck in 2 years and if she is showing progression we would then consider retreating with Reclast or Prolia.  - IONIZED CALCIUM; Future         "

## 2020-09-29 RX ORDER — LEVOTHYROXINE SODIUM 112 UG/1
TABLET ORAL
Qty: 90 TAB | Refills: 0 | Status: SHIPPED | OUTPATIENT
Start: 2020-09-29 | End: 2020-10-15 | Stop reason: SDUPTHER

## 2020-10-01 ENCOUNTER — PATIENT MESSAGE (OUTPATIENT)
Dept: MEDICAL GROUP | Facility: PHYSICIAN GROUP | Age: 75
End: 2020-10-01

## 2020-10-01 DIAGNOSIS — E78.5 HYPERLIPIDEMIA, UNSPECIFIED HYPERLIPIDEMIA TYPE: ICD-10-CM

## 2020-10-02 RX ORDER — SIMVASTATIN 20 MG
20 TABLET ORAL EVERY EVENING
Qty: 90 TAB | Refills: 3 | Status: SHIPPED | OUTPATIENT
Start: 2020-10-02 | End: 2020-10-15 | Stop reason: SDUPTHER

## 2020-10-12 NOTE — PROGRESS NOTES
Verified 3 forms of HIPAA with member. Member called to inquire on flu vaccine events, advised of that we have the events at [facility] but she stated that it was too far. I scheduled an appointment with the [facility] with the medical assistant for the vaccine.

## 2020-10-13 ENCOUNTER — NON-PROVIDER VISIT (OUTPATIENT)
Dept: MEDICAL GROUP | Facility: PHYSICIAN GROUP | Age: 75
End: 2020-10-13
Payer: MEDICARE

## 2020-10-13 DIAGNOSIS — Z23 NEED FOR VACCINATION: ICD-10-CM

## 2020-10-13 PROCEDURE — 90662 IIV NO PRSV INCREASED AG IM: CPT | Performed by: FAMILY MEDICINE

## 2020-10-13 PROCEDURE — G0008 ADMIN INFLUENZA VIRUS VAC: HCPCS | Performed by: FAMILY MEDICINE

## 2020-10-13 NOTE — PROGRESS NOTES
"Padmini Jorge is a 75 y.o. female here for a non-provider visit for:   FLU    Reason for immunization: Annual Flu Vaccine  Immunization records indicate need for vaccine: Yes, confirmed with Epic  Minimum interval has been met for this vaccine: Yes  ABN completed: Not Indicated    Order and dose verified by: GIANNI ROSS Dated 08/15/2019 was given to patient: Yes  All IAC Questionnaire questions were answered \"No.\"    Patient tolerated injection and no adverse effects were observed or reported: Yes    Pt scheduled for next dose in series: Not Indicated    "

## 2020-10-15 DIAGNOSIS — E78.5 HYPERLIPIDEMIA, UNSPECIFIED HYPERLIPIDEMIA TYPE: ICD-10-CM

## 2020-10-15 RX ORDER — LEVOTHYROXINE SODIUM 112 UG/1
112 TABLET ORAL
Qty: 100 TAB | Refills: 0 | Status: SHIPPED | OUTPATIENT
Start: 2020-10-15 | End: 2020-11-09 | Stop reason: SDUPTHER

## 2020-10-15 RX ORDER — AMITRIPTYLINE HYDROCHLORIDE 25 MG/1
25 TABLET, FILM COATED ORAL NIGHTLY PRN
Qty: 100 TAB | Refills: 0 | Status: SHIPPED | OUTPATIENT
Start: 2020-10-15 | End: 2020-11-09 | Stop reason: SDUPTHER

## 2020-10-15 RX ORDER — SIMVASTATIN 20 MG
20 TABLET ORAL EVERY EVENING
Qty: 100 TAB | Refills: 0 | Status: SHIPPED | OUTPATIENT
Start: 2020-10-15 | End: 2020-11-09 | Stop reason: SDUPTHER

## 2020-11-09 ENCOUNTER — OFFICE VISIT (OUTPATIENT)
Dept: MEDICAL GROUP | Facility: LAB | Age: 75
End: 2020-11-09
Payer: MEDICARE

## 2020-11-09 ENCOUNTER — HOSPITAL ENCOUNTER (OUTPATIENT)
Dept: LAB | Facility: MEDICAL CENTER | Age: 75
End: 2020-11-09
Attending: FAMILY MEDICINE
Payer: MEDICARE

## 2020-11-09 VITALS
OXYGEN SATURATION: 99 % | HEIGHT: 63 IN | DIASTOLIC BLOOD PRESSURE: 70 MMHG | TEMPERATURE: 98.1 F | WEIGHT: 171 LBS | HEART RATE: 76 BPM | SYSTOLIC BLOOD PRESSURE: 136 MMHG | RESPIRATION RATE: 13 BRPM | BODY MASS INDEX: 30.3 KG/M2

## 2020-11-09 DIAGNOSIS — Z76.89 ENCOUNTER TO ESTABLISH CARE: ICD-10-CM

## 2020-11-09 DIAGNOSIS — R73.03 PREDIABETES: ICD-10-CM

## 2020-11-09 DIAGNOSIS — Z23 NEED FOR VACCINATION: ICD-10-CM

## 2020-11-09 DIAGNOSIS — M85.80 OSTEOPENIA, UNSPECIFIED LOCATION: ICD-10-CM

## 2020-11-09 DIAGNOSIS — F51.01 PRIMARY INSOMNIA: ICD-10-CM

## 2020-11-09 DIAGNOSIS — E03.9 HYPOTHYROIDISM (ACQUIRED): ICD-10-CM

## 2020-11-09 DIAGNOSIS — E78.5 HYPERLIPIDEMIA, UNSPECIFIED HYPERLIPIDEMIA TYPE: ICD-10-CM

## 2020-11-09 LAB
EST. AVERAGE GLUCOSE BLD GHB EST-MCNC: 123 MG/DL
HBA1C MFR BLD: 5.9 % (ref 0–5.6)

## 2020-11-09 PROCEDURE — 90715 TDAP VACCINE 7 YRS/> IM: CPT | Performed by: FAMILY MEDICINE

## 2020-11-09 PROCEDURE — 99214 OFFICE O/P EST MOD 30 MIN: CPT | Mod: 25 | Performed by: FAMILY MEDICINE

## 2020-11-09 PROCEDURE — 90471 IMMUNIZATION ADMIN: CPT | Performed by: FAMILY MEDICINE

## 2020-11-09 PROCEDURE — 36415 COLL VENOUS BLD VENIPUNCTURE: CPT

## 2020-11-09 PROCEDURE — 83036 HEMOGLOBIN GLYCOSYLATED A1C: CPT

## 2020-11-09 RX ORDER — LEVOTHYROXINE SODIUM 112 UG/1
112 TABLET ORAL
Qty: 100 TAB | Refills: 3 | Status: SHIPPED | OUTPATIENT
Start: 2020-11-09 | End: 2021-02-17

## 2020-11-09 RX ORDER — AMITRIPTYLINE HYDROCHLORIDE 25 MG/1
25 TABLET, FILM COATED ORAL NIGHTLY PRN
Qty: 100 TAB | Refills: 3 | Status: SHIPPED | OUTPATIENT
Start: 2020-11-09 | End: 2021-02-17

## 2020-11-09 RX ORDER — SIMVASTATIN 20 MG
20 TABLET ORAL EVERY EVENING
Qty: 100 TAB | Refills: 3 | Status: SHIPPED | OUTPATIENT
Start: 2020-11-09 | End: 2021-02-17

## 2020-11-09 ASSESSMENT — FIBROSIS 4 INDEX: FIB4 SCORE: 0.67

## 2020-11-09 ASSESSMENT — ENCOUNTER SYMPTOMS
CHILLS: 0
WHEEZING: 0
DIZZINESS: 0
SHORTNESS OF BREATH: 0
NERVOUS/ANXIOUS: 0
HEADACHES: 0
DEPRESSION: 0
FEVER: 0
ABDOMINAL PAIN: 0
NAUSEA: 0
DIARRHEA: 0
DOUBLE VISION: 0
PALPITATIONS: 0
VOMITING: 0
CONSTIPATION: 0
BLURRED VISION: 0

## 2020-11-09 NOTE — PROGRESS NOTES
Subjective:   Yoselin Jorge is a 75 y.o. female here today for   Chief Complaint   Patient presents with   • Establish Care   • Medication Refill     #Establish care  -Reviewed all past medical history, family history, social history.  -Reviewed all screening/vaccinations: Due for Tdap  -Diet and Exercise: Appropriate for age  -Tobacco, alcohol, recreational drug use: Patient alcohol use.  Denies any recreational drug use, tobacco use.  -Sexually active: Yes, monogamous with , no concerns regarding relationship.  -Occupation: Tired    #Hypothyroid  -onset 3 years ago with surgical resection of thyroid.  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  They have been monitoring routinely and found questionable lump on thyroid, decided to do total thyroidectomy.  Since then she has been on levothyroxine.  She states that she felt well ever since taking medication with no concerns or questions.  Is compliant medication, denies any side effects.  Denies any hot or cold intolerance, change in hair or nails, diarrhea, constipation, tremors.    #Osteopenia  -Diagnosed after DEXA scan completed earlier this year.  She is working on vitamin D, calcium supplement.  Is also working on weightbearing exercises.  -Has no questions or concerns at this time, plans on repeat DEXA in 2 to 3 years.    #Hyperlipidemia  -Chronic condition, new to me.  Currently treating with simvastatin 20 mg every evening.  Is compliant medication, denies any side effects including increased fatigue, headache, myalgias.  -Is work on appropriate diet, has no questions or concerns at this time.    #Prediabetes:  -Diagnosed with an elevated hemoglobin A1c in July of this year.  Does state that she is doing well, working on diet and exercise.  Denies any increased fatigue, polydipsia, polyuria.    #Insomnia  -Condition, new to me.  Currently treating with amitriptyline 25 mg daily as needed.  No side effects of medication, only uses  occasionally.    No Known Allergies      Current medicines (including changes today)  Current Outpatient Medications   Medication Sig Dispense Refill   • amitriptyline (ELAVIL) 25 MG Tab Take 1 Tab by mouth at bedtime as needed. (Patient taking differently: Take 25 mg by mouth at bedtime as needed for Sleep.) 100 Tab 0   • levothyroxine (SYNTHROID) 112 MCG Tab Take 1 Tab by mouth Every morning on an empty stomach. 100 Tab 0   • simvastatin (ZOCOR) 20 MG Tab Take 1 Tab by mouth every evening. 100 Tab 0   • Multiple Vitamins-Minerals (WOMENS BONE HEALTH PO) Take  by mouth.       No current facility-administered medications for this visit.      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    ROS   Review of Systems   Constitutional: Negative for chills and fever.   HENT: Negative for hearing loss and tinnitus.    Eyes: Negative for blurred vision and double vision.   Respiratory: Negative for shortness of breath and wheezing.    Cardiovascular: Negative for chest pain and palpitations.   Gastrointestinal: Negative for abdominal pain, constipation, diarrhea, nausea and vomiting.   Skin: Negative for rash.   Neurological: Negative for dizziness and headaches.   Psychiatric/Behavioral: Negative for  "depression. The patient is not nervous/anxious.           Objective:     Physical Exam:  /70 (BP Location: Right arm, Patient Position: Sitting, BP Cuff Size: Adult)   Pulse 76   Temp 36.7 °C (98.1 °F) (Temporal)   Resp 13   Ht 1.6 m (5' 3\")   Wt 77.6 kg (171 lb)   SpO2 99%  Body mass index is 30.29 kg/m².   Constitutional: Alert, no distress.  Skin: Warm, dry, good turgor, no rashes in visible areas.  Eye: Equal, round and reactive, conjunctiva clear, lids normal.  ENMT: TM's clear bilaterally, lips without lesions, good dentition, oropharynx clear.  Neck: Trachea midline, no masses, no thyromegaly. No cervical or supraclavicular lymphadenopathy.  Respiratory: Unlabored respiratory effort, lungs clear to auscultation, no wheezes, no rhonchi.  Cardiovascular: Normal S1, S2, no murmur, no edema.  Abdomen: Soft, non-tender, no masses, no hepatosplenomegaly.  Psych: Alert and oriented x3, normal affect and mood.    Assessment and Plan:     1. Encounter to establish care  -All questions concerns were answered at this time.  -Vaccinations/screenings needed at this time: tdap  -Labs reviewed, no concerns   -Discussed the importance of a healthy, Mediterranean style diet, routine exercise regimen.  -Discussed general safety measures including seatbelts, helmets, avoidance of smoking, sunscreen, hydration.  -Follow-up for general physical exam on a yearly basis, sooner if needed.    2. Hyperlipidemia, unspecified hyperlipidemia type  -Status: Stable.  Reviewed previous labs, cholesterol is at goal.  Continue taking Zocor, continue to work on appropriate diet and exercise regimen.  - simvastatin (ZOCOR) 20 MG Tab; Take 1 Tab by mouth every evening for 100 days.  Dispense: 100 Tab; Refill: 3    3. Prediabetes  -We will recheck hemoglobin A1c at this time.  Continue to work on appropriate diet and exercise regimen.  - HEMOGLOBIN A1C; Future    4. Osteopenia, unspecified location  -Reviewed previous DEXA scan, DEXA " does show osteopenia.  Discussed the importance of calcium, vitamin D supplementation as well as weightbearing exercises.  -We will recheck DEXA again in 2 to 3 years.    5. Hypothyroidism (acquired)  -Status: Stable.  TSH checked in July of this year was at goal.  Will continue with Synthroid 112 mcg daily.  - levothyroxine (SYNTHROID) 112 MCG Tab; Take 1 Tab by mouth Every morning on an empty stomach for 100 days.  Dispense: 100 Tab; Refill: 3    6. Primary insomnia  -Status: Stable.  We will continue treatment with amitriptyline as needed.  - amitriptyline (ELAVIL) 25 MG Tab; Take 1 Tab by mouth at bedtime as needed for up to 100 days.  Dispense: 100 Tab; Refill: 3    7. Need for vaccination  Patient was agreeable to receiving the tdap vaccine in clinic today after discussion of potential risks, benefits, and side effects. Vaccine was administered without adverse effects.  - TDAP VACCINE =>6YO IM    Followup: Return in about 1 year (around 11/9/2021).         PLEASE NOTE: This dictation was created using voice recognition software. I have made every reasonable attempt to correct obvious errors, but I expect that there are errors of grammar and possibly content that I did not discover before finalizing the note.

## 2021-01-15 DIAGNOSIS — Z23 NEED FOR VACCINATION: ICD-10-CM

## 2021-01-23 ENCOUNTER — IMMUNIZATION (OUTPATIENT)
Dept: FAMILY PLANNING/WOMEN'S HEALTH CLINIC | Facility: IMMUNIZATION CENTER | Age: 76
End: 2021-01-23
Attending: INTERNAL MEDICINE
Payer: MEDICARE

## 2021-01-23 DIAGNOSIS — Z23 ENCOUNTER FOR VACCINATION: Primary | ICD-10-CM

## 2021-01-23 DIAGNOSIS — Z23 NEED FOR VACCINATION: ICD-10-CM

## 2021-01-23 PROCEDURE — 91300 PFIZER SARS-COV-2 VACCINE: CPT

## 2021-01-23 PROCEDURE — 0001A PFIZER SARS-COV-2 VACCINE: CPT

## 2021-02-13 ENCOUNTER — IMMUNIZATION (OUTPATIENT)
Dept: FAMILY PLANNING/WOMEN'S HEALTH CLINIC | Facility: IMMUNIZATION CENTER | Age: 76
End: 2021-02-13
Payer: MEDICARE

## 2021-02-13 DIAGNOSIS — Z23 ENCOUNTER FOR VACCINATION: Primary | ICD-10-CM

## 2021-02-13 PROCEDURE — 0002A PFIZER SARS-COV-2 VACCINE: CPT

## 2021-02-13 PROCEDURE — 91300 PFIZER SARS-COV-2 VACCINE: CPT

## 2021-05-02 ENCOUNTER — PATIENT MESSAGE (OUTPATIENT)
Dept: HEALTH INFORMATION MANAGEMENT | Facility: OTHER | Age: 76
End: 2021-05-02

## 2021-06-16 ENCOUNTER — OFFICE VISIT (OUTPATIENT)
Dept: MEDICAL GROUP | Facility: LAB | Age: 76
End: 2021-06-16
Payer: MEDICARE

## 2021-06-16 VITALS
HEART RATE: 73 BPM | RESPIRATION RATE: 15 BRPM | DIASTOLIC BLOOD PRESSURE: 68 MMHG | WEIGHT: 171 LBS | HEIGHT: 63 IN | BODY MASS INDEX: 30.3 KG/M2 | SYSTOLIC BLOOD PRESSURE: 148 MMHG | TEMPERATURE: 98.2 F | OXYGEN SATURATION: 96 %

## 2021-06-16 DIAGNOSIS — R13.12 OROPHARYNGEAL DYSPHAGIA: ICD-10-CM

## 2021-06-16 DIAGNOSIS — R25.2 LEG CRAMPING: ICD-10-CM

## 2021-06-16 PROCEDURE — 99214 OFFICE O/P EST MOD 30 MIN: CPT | Performed by: FAMILY MEDICINE

## 2021-06-16 RX ORDER — SIMVASTATIN 20 MG
TABLET ORAL
COMMUNITY
Start: 2021-06-01 | End: 2021-06-16

## 2021-06-16 RX ORDER — AMITRIPTYLINE HYDROCHLORIDE 25 MG/1
TABLET, FILM COATED ORAL
COMMUNITY
Start: 2021-05-08 | End: 2022-04-18

## 2021-06-16 RX ORDER — LEVOTHYROXINE SODIUM 112 UG/1
TABLET ORAL
COMMUNITY
Start: 2021-05-03 | End: 2022-03-01

## 2021-06-16 ASSESSMENT — FIBROSIS 4 INDEX: FIB4 SCORE: 0.67

## 2021-06-16 ASSESSMENT — PATIENT HEALTH QUESTIONNAIRE - PHQ9: CLINICAL INTERPRETATION OF PHQ2 SCORE: 0

## 2021-06-18 ENCOUNTER — HOSPITAL ENCOUNTER (OUTPATIENT)
Dept: LAB | Facility: MEDICAL CENTER | Age: 76
End: 2021-06-18
Attending: FAMILY MEDICINE
Payer: MEDICARE

## 2021-06-18 DIAGNOSIS — R25.2 LEG CRAMPING: ICD-10-CM

## 2021-06-18 LAB
ALBUMIN SERPL BCP-MCNC: 4.2 G/DL (ref 3.2–4.9)
ALBUMIN/GLOB SERPL: 1.4 G/DL
ALP SERPL-CCNC: 81 U/L (ref 30–99)
ALT SERPL-CCNC: 20 U/L (ref 2–50)
ANION GAP SERPL CALC-SCNC: 13 MMOL/L (ref 7–16)
AST SERPL-CCNC: 18 U/L (ref 12–45)
BILIRUB SERPL-MCNC: 0.2 MG/DL (ref 0.1–1.5)
BUN SERPL-MCNC: 15 MG/DL (ref 8–22)
CALCIUM SERPL-MCNC: 9.3 MG/DL (ref 8.5–10.5)
CHLORIDE SERPL-SCNC: 105 MMOL/L (ref 96–112)
CK SERPL-CCNC: 103 U/L (ref 0–154)
CO2 SERPL-SCNC: 22 MMOL/L (ref 20–33)
CREAT SERPL-MCNC: 0.64 MG/DL (ref 0.5–1.4)
ERYTHROCYTE [DISTWIDTH] IN BLOOD BY AUTOMATED COUNT: 50.1 FL (ref 35.9–50)
GLOBULIN SER CALC-MCNC: 2.9 G/DL (ref 1.9–3.5)
GLUCOSE SERPL-MCNC: 109 MG/DL (ref 65–99)
HCT VFR BLD AUTO: 43.1 % (ref 37–47)
HGB BLD-MCNC: 13.8 G/DL (ref 12–16)
MCH RBC QN AUTO: 28.8 PG (ref 27–33)
MCHC RBC AUTO-ENTMCNC: 32 G/DL (ref 33.6–35)
MCV RBC AUTO: 90 FL (ref 81.4–97.8)
PLATELET # BLD AUTO: 386 K/UL (ref 164–446)
PMV BLD AUTO: 11.5 FL (ref 9–12.9)
POTASSIUM SERPL-SCNC: 4.4 MMOL/L (ref 3.6–5.5)
PROT SERPL-MCNC: 7.1 G/DL (ref 6–8.2)
RBC # BLD AUTO: 4.79 M/UL (ref 4.2–5.4)
SODIUM SERPL-SCNC: 140 MMOL/L (ref 135–145)
T4 FREE SERPL-MCNC: 1.38 NG/DL (ref 0.93–1.7)
TSH SERPL DL<=0.005 MIU/L-ACNC: 0.29 UIU/ML (ref 0.38–5.33)
WBC # BLD AUTO: 6.3 K/UL (ref 4.8–10.8)

## 2021-06-18 PROCEDURE — 80053 COMPREHEN METABOLIC PANEL: CPT

## 2021-06-18 PROCEDURE — 84439 ASSAY OF FREE THYROXINE: CPT

## 2021-06-18 PROCEDURE — 84443 ASSAY THYROID STIM HORMONE: CPT

## 2021-06-18 PROCEDURE — 85027 COMPLETE CBC AUTOMATED: CPT

## 2021-06-18 PROCEDURE — 82550 ASSAY OF CK (CPK): CPT

## 2021-06-18 PROCEDURE — 36415 COLL VENOUS BLD VENIPUNCTURE: CPT

## 2021-06-18 NOTE — PROGRESS NOTES
Subjective:   Yoselin Jorge is a 75 y.o. female here today for   Chief Complaint   Patient presents with   • Leg Cramps       #Leg cramps:  -For the last several nights patient has been experiencing leg cramps.  She states that she has sharp, aching pain in her legs and her aspect of thighs, down to her shins.  This only occurs at night.  No palliative/provocative measures noted.  No previous injuries.  She states this is never happened before.  -Patient has a history of dyslipidemia, currently taking statin medication.  She is compliant with medication.  Denies any fevers, chills, chest pain, shortness of breath, abdominal pain, nausea, vomiting, diarrhea.    #Dysphagia:  -Patient has a history of nasopharyngeal cancer, treatment thereof had caused some difficulty swallowing, status post dilation several years ago.  She states that she is having continued dysphagia at this time.  She has difficulties on both solids and liquids.  She is concerned that the stricture has returned requesting referral to gastroenterology.    No Known Allergies      Current medicines (including changes today)  Current Outpatient Medications   Medication Sig Dispense Refill   • levothyroxine (SYNTHROID) 112 MCG Tab      • amitriptyline (ELAVIL) 25 MG Tab      • Multiple Vitamins-Minerals (WOMENS BONE HEALTH PO) Take  by mouth.       No current facility-administered medications for this visit.     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    ROS   -See above       Objective:     Physical Exam:  /68   Pulse 73   Temp 36.8 °C (98.2 °F) (Temporal)   Resp 15   Ht 1.6 m (5' 2.99\")   Wt 77.6 kg (171 lb)   SpO2 96%  Body mass index is 30.3 kg/m².   Constitutional: Alert, no distress.  Skin: Warm, dry, good turgor, no rashes in visible areas.  Eye: Equal, round and reactive, conjunctiva clear, lids normal.  ENMT: TM's clear bilaterally, lips without lesions, good dentition, oropharynx clear.  Neck: Trachea midline, no masses, no thyromegaly. No cervical or supraclavicular lymphadenopathy.  Respiratory: Unlabored respiratory effort, lungs clear to auscultation, no wheezes, no rhonchi.  Cardiovascular: Normal S1, S2, no murmur, no edema.  Abdomen: Soft, non-tender, no masses, no hepatosplenomegaly.  Psych: Alert and oriented x3, normal affect and mood.    Assessment and Plan:     1. Leg cramping  -Uncertain etiology at this time; however, given history of statin usage we will continue statin at this time.  We will also check labs as below for any other intrinsic etiology.  -Follow-up as needed.  - CBC WITHOUT DIFFERENTIAL; Future  - Comp Metabolic Panel; Future  - TSH WITH REFLEX TO FT4; Future  - CREATINE KINASE; Future    2. Oropharyngeal dysphagia  -Given worsening dysphagia I am also concerned about possible stricture returning.  Given this we will refer to gastroenterology for further evaluation and treatment.  - REFERRAL TO GASTROENTEROLOGY      Followup: Return if symptoms worsen or fail to improve.         PLEASE NOTE: This dictation was created using voice recognition software. I have made every reasonable attempt to correct obvious errors, but I expect that there are errors of grammar and possibly content that " I did not discover before finalizing the note.

## 2021-07-08 ENCOUNTER — PATIENT MESSAGE (OUTPATIENT)
Dept: MEDICAL GROUP | Facility: LAB | Age: 76
End: 2021-07-08

## 2021-07-08 DIAGNOSIS — E78.5 HYPERLIPIDEMIA, UNSPECIFIED HYPERLIPIDEMIA TYPE: ICD-10-CM

## 2021-08-23 ENCOUNTER — HOSPITAL ENCOUNTER (OUTPATIENT)
Dept: RADIOLOGY | Facility: MEDICAL CENTER | Age: 76
End: 2021-08-23
Attending: INTERNAL MEDICINE
Payer: MEDICARE

## 2021-08-23 DIAGNOSIS — K22.2 STRICTURE OF ESOPHAGUS: ICD-10-CM

## 2021-08-23 DIAGNOSIS — C11.9 MALIGNANT NEOPLASM OF NASOPHARYNGEAL WALL (HCC): ICD-10-CM

## 2021-08-23 DIAGNOSIS — R13.14 DYSPHAGIA, PHARYNGOESOPHAGEAL PHASE: ICD-10-CM

## 2021-08-23 PROCEDURE — 700117 HCHG RX CONTRAST REV CODE 255: Performed by: INTERNAL MEDICINE

## 2021-08-23 PROCEDURE — 74220 X-RAY XM ESOPHAGUS 1CNTRST: CPT

## 2021-08-23 RX ADMIN — BARIUM SULFATE 700 MG: 700 TABLET ORAL at 14:45

## 2021-10-01 ENCOUNTER — HOSPITAL ENCOUNTER (OUTPATIENT)
Dept: RADIOLOGY | Facility: MEDICAL CENTER | Age: 76
End: 2021-10-01
Attending: FAMILY MEDICINE
Payer: MEDICARE

## 2021-10-01 DIAGNOSIS — Z12.31 VISIT FOR SCREENING MAMMOGRAM: ICD-10-CM

## 2021-10-01 PROCEDURE — 77063 BREAST TOMOSYNTHESIS BI: CPT

## 2021-10-22 ENCOUNTER — TELEPHONE (OUTPATIENT)
Dept: MEDICAL GROUP | Facility: LAB | Age: 76
End: 2021-10-22

## 2021-10-22 NOTE — TELEPHONE ENCOUNTER
Left message for patient to call back regarding pre-visit planning. Please transfer call to [phone]

## 2021-10-22 NOTE — TELEPHONE ENCOUNTER
ANNUAL WELLNESS VISIT PRE-VISIT PLANNING    1.  Reviewed notes from the last office visit: Yes    2.  If any orders were ordered or intended to be done prior to visit (i.e. 6 mos follow-up), do we have results/consult notes or has patient scheduled?        •  Labs - Labs ordered, completed on 6/18/21 and results are in chart.  Note: If patient appointment is for lab review and patient did not complete labs, check with provider if OK to reschedule patient until labs completed.       •  Imaging - Imaging ordered, completed and results are in chart.       •  Referrals - Referral ordered, patient was seen and consult notes are in chart. Care Teams updated  YES.    3.  Immunizations were updated in Epic using Reconcile Outside Information activity? Yes    4.  Patient is due for the following Health Maintenance Topics:   Health Maintenance Due   Topic Date Due   • Annual Wellness Visit  07/28/2021     5.  Reviewed/Updated the following with patient:       •   Preferred Pharmacy? Yes        •   Preferred Lab? Yes        •   Preferred Communication? Yes        •   Allergies? Yes        •   Medications? Yes, abstract    6.  Care Team Updated:       •   DME Company (gait device, O2, CPAP, etc.): N\A       •   Other Specialists (eye doctor, derm, GYN, cardiology, endo, etc): Yes     7.  Patient was advised: “This is a free wellness visit. The provider will screen for medical conditions to help you stay healthy. If you have other concerns to address you may be asked to discuss these at a separate visit or there may be an additional fee.”     8.  AHA (Puls[employer]) form printed for Provider? Yes

## 2021-10-27 ENCOUNTER — HOSPITAL ENCOUNTER (OUTPATIENT)
Dept: LAB | Facility: MEDICAL CENTER | Age: 76
End: 2021-10-27
Attending: FAMILY MEDICINE
Payer: MEDICARE

## 2021-10-27 DIAGNOSIS — E78.5 HYPERLIPIDEMIA, UNSPECIFIED HYPERLIPIDEMIA TYPE: ICD-10-CM

## 2021-10-27 LAB
CHOLEST SERPL-MCNC: 241 MG/DL (ref 100–199)
FASTING STATUS PATIENT QL REPORTED: NORMAL
HDLC SERPL-MCNC: 57 MG/DL
LDLC SERPL CALC-MCNC: 163 MG/DL
TRIGL SERPL-MCNC: 106 MG/DL (ref 0–149)

## 2021-10-27 PROCEDURE — 80061 LIPID PANEL: CPT

## 2021-10-27 PROCEDURE — 36415 COLL VENOUS BLD VENIPUNCTURE: CPT

## 2021-11-01 ENCOUNTER — OFFICE VISIT (OUTPATIENT)
Dept: MEDICAL GROUP | Facility: LAB | Age: 76
End: 2021-11-01
Payer: MEDICARE

## 2021-11-01 VITALS
RESPIRATION RATE: 12 BRPM | WEIGHT: 171.2 LBS | DIASTOLIC BLOOD PRESSURE: 60 MMHG | SYSTOLIC BLOOD PRESSURE: 110 MMHG | BODY MASS INDEX: 30.33 KG/M2 | HEART RATE: 67 BPM | OXYGEN SATURATION: 96 % | TEMPERATURE: 97.7 F | HEIGHT: 63 IN

## 2021-11-01 DIAGNOSIS — Z98.890 S/P DILATATION OF ESOPHAGEAL STRICTURE: ICD-10-CM

## 2021-11-01 DIAGNOSIS — R73.03 PREDIABETES: ICD-10-CM

## 2021-11-01 DIAGNOSIS — M85.80 OSTEOPENIA, UNSPECIFIED LOCATION: ICD-10-CM

## 2021-11-01 DIAGNOSIS — H91.93 BILATERAL HEARING LOSS, UNSPECIFIED HEARING LOSS TYPE: ICD-10-CM

## 2021-11-01 DIAGNOSIS — E78.00 PURE HYPERCHOLESTEROLEMIA: ICD-10-CM

## 2021-11-01 DIAGNOSIS — E03.9 HYPOTHYROIDISM (ACQUIRED): ICD-10-CM

## 2021-11-01 DIAGNOSIS — F51.01 PRIMARY INSOMNIA: ICD-10-CM

## 2021-11-01 DIAGNOSIS — H35.3221 EXUDATIVE AGE-RELATED MACULAR DEGENERATION OF LEFT EYE WITH ACTIVE CHOROIDAL NEOVASCULARIZATION (HCC): ICD-10-CM

## 2021-11-01 DIAGNOSIS — Z00.00 ENCOUNTER FOR ANNUAL WELLNESS EXAM IN MEDICARE PATIENT: ICD-10-CM

## 2021-11-01 DIAGNOSIS — Z87.19 S/P DILATATION OF ESOPHAGEAL STRICTURE: ICD-10-CM

## 2021-11-01 DIAGNOSIS — L84 CORN OF TOE: ICD-10-CM

## 2021-11-01 PROCEDURE — G0439 PPPS, SUBSEQ VISIT: HCPCS | Performed by: FAMILY MEDICINE

## 2021-11-01 RX ORDER — SUCRALFATE 1 G/1
TABLET ORAL
COMMUNITY
Start: 2021-10-30 | End: 2021-11-01

## 2021-11-01 RX ORDER — ATORVASTATIN CALCIUM 10 MG/1
10 TABLET, FILM COATED ORAL DAILY
Qty: 90 TABLET | Refills: 3 | Status: SHIPPED | OUTPATIENT
Start: 2021-11-01 | End: 2021-11-01 | Stop reason: SDUPTHER

## 2021-11-01 RX ORDER — ATORVASTATIN CALCIUM 10 MG/1
10 TABLET, FILM COATED ORAL DAILY
Qty: 100 TABLET | Refills: 2 | Status: SHIPPED | OUTPATIENT
Start: 2021-11-01 | End: 2022-01-30

## 2021-11-01 ASSESSMENT — PATIENT HEALTH QUESTIONNAIRE - PHQ9: CLINICAL INTERPRETATION OF PHQ2 SCORE: 0

## 2021-11-01 ASSESSMENT — FIBROSIS 4 INDEX: FIB4 SCORE: 0.78

## 2021-11-01 ASSESSMENT — ACTIVITIES OF DAILY LIVING (ADL): BATHING_REQUIRES_ASSISTANCE: 0

## 2021-11-01 ASSESSMENT — ENCOUNTER SYMPTOMS: GENERAL WELL-BEING: GOOD

## 2021-11-01 NOTE — PROGRESS NOTES
"Subjective:   Chief Complaint/History of Present Illness:  Yoselin Jorge is a 76 y.o. female established patient who presents today to discuss medical problems as listed below. Yoselin Ackerman is accompanied by ***.    No problems updated.     Current Medications:  Current Outpatient Medications Ordered in Epic   Medication Sig Dispense Refill   • levothyroxine (SYNTHROID) 112 MCG Tab      • amitriptyline (ELAVIL) 25 MG Tab      • Multiple Vitamins-Minerals (WOMENS BONE HEALTH PO) Take  by mouth.       No current Epic-ordered facility-administered medications on file.          Objective:   Physical Exam:    Vitals: /60 (BP Location: Right arm, Patient Position: Sitting, BP Cuff Size: Adult)   Pulse 67   Temp 36.5 °C (97.7 °F) (Temporal)   Resp 12   Ht 1.6 m (5' 3\")   Wt 77.7 kg (171 lb 3.2 oz)   SpO2 96%    BMI: Body mass index is 30.33 kg/m².  Physical Exam          Assessment and Plan:   Yoselin Ackerman is a 76 y.o. female with the following:  Problem List Items Addressed This Visit     None             Annual Health Assessment Questions:    1.  Are you currently engaging in any exercise or physical activity? Yes  Walks 8000 to 03031 steps a day   2.  How would you describe your mood or emotional well-being today? good    3.  Have you had any falls in the last year? No    4.  Have you noticed any problems with your balance or had difficulty walking? No    5.  In the last six months have you experienced any leakage of urine? Yes    6. DPA/Advanced Directive: Patient has Advanced Directive on file.        RTC: No follow-ups on file.    I spent a total of *** minutes with record review, exam, communication with the patient, communication with other providers, and documentation of this encounter.    PLEASE NOTE: This dictation was created using voice recognition software. I have made every reasonable attempt to correct obvious errors, but I expect that there are errors of grammar and possibly content that I did " not discover before finalizing the note.

## 2021-11-01 NOTE — PROGRESS NOTES
Chief Complaint   Patient presents with   • Follow-Up     4 months   • Lab Results   • Other     AHA     HPI:  Yoselin Jorge is a 76 y.o. female here for Medicare Annual Wellness Visit     #Brookings:  -Patient has a rather large corn on the second toe of her left foot.  She had previous reconstructive surgery there several years ago.  She states been rubbing, causing some discomfort and limitations in walking for the last few years.  Requesting referral to podiatry at this time.    Current supplements as per medication list.       Allergies: Patient has no known allergies.    Current social contact/activities:      She  reports that she has never smoked. She has never used smokeless tobacco. She reports current alcohol use of about 0.6 oz of alcohol per week. She reports that she does not use drugs.  Counseling given: Not Answered      DPA/Advanced Directive:  Patient has Advanced Directive on file.     ROS:    Gait: Uses no assistive device  Ostomy: No  Other tubes: No  Amputations: No  Chronic oxygen use: No  Last eye exam: 1 yesr ago   Wears hearing aids: Yes   : Reports urinary leakage during the last 6 months that has interfered a lot with their daily activities or sleep.    Screening:    Depression Screening    Little interest or pleasure in doing things?  0 - not at all  Feeling down, depressed , or hopeless? 0 - not at all  Patient Health Questionnaire Score: 0     If depressive symptoms identified deferred to follow up visit unless specifically addressed in assessment and plan.    Interpretation of PHQ-9 Total Score   Score Severity   1-4 No Depression   5-9 Mild Depression   10-14 Moderate Depression   15-19 Moderately Severe Depression   20-27 Severe Depression    Screening for Cognitive Impairment    Three Minute Recall (captain, garden, picture) 2/3 Captain denton   Forgot picture   Zach clock face with all 12 numbers and set the hands to show 5 past 8.  Yes    Cognitive concerns identified deferred  for follow up unless specifically addressed in assessment and plan.    Fall Risk Assessment    Has the patient had two or more falls in the last year or any fall with injury in the last year?       Safety Assessment    Throw rugs on floor.  No  Handrails on all stairs.  Yes  Good lighting in all hallways.  Yes  Difficulty hearing.  Yes  Patient counseled about all safety risks that were identified.    Functional Assessment ADLs    Are there any barriers preventing you from cooking for yourself or meeting nutritional needs?  No.    Are there any barriers preventing you from driving safely or obtaining transportation?  No.    Are there any barriers preventing you from using a telephone or calling for help?  No.    Are there any barriers preventing you from shopping?  No.    Are there any barriers preventing you from taking care of your own finances?  No.    Are there any barriers preventing you from managing your medications?  No.    Are there any barriers preventing you from showering, bathing or dressing yourself?  No.    Are you currently engaging in any exercise or physical activity?  Yes.  Walk and still working   What is your perception of your health?  Good.      Health Maintenance Summary                Annual Wellness Visit Overdue 7/28/2021      Done 7/27/2020 SUBSEQUENT ANNUAL WELLNESS VISIT-INCLUDES PPPS ()     Patient has more history with this topic...    BONE DENSITY Next Due 8/27/2022      Done 8/27/2020 DS-BONE DENSITY STUDY (DEXA)     Patient has more history with this topic...    MAMMOGRAM Next Due 10/1/2022      Done 10/1/2021 MA-SCREENING MAMMO BILAT W/TOMOSYNTHESIS W/CAD     Patient has more history with this topic...    COLORECTAL CANCER SCREENING Next Due 1/5/2026     IMM DTaP/Tdap/Td Vaccine Next Due 11/9/2030      Done 11/9/2020 Imm Admin: Tdap Vaccine     Patient has more history with this topic...          Patient Care Team:  Brian Cutler M.D. as PCP - General (Family  Medicine)  Slick Pickett O.D. as Consulting Physician (Optometry)  Raimundo Love DDS as Consulting Physician (Dentistry)  Jorge L Weinstein as Consulting Physician (Orthopedic Surgery)  Mitchel Manuel M.D. as Consulting Physician (Otolaryngology)  Bolivar Lopez Ass't as [facility]   Arlyn Dawson M.D. as Consulting Physician (Ophthalmology)  Osiel Pizarro M.D. (Gastroenterology)        Social History     Tobacco Use   • Smoking status: Never Smoker   • Smokeless tobacco: Never Used   Vaping Use   • Vaping Use: Never used   Substance Use Topics   • Alcohol use: Yes     Alcohol/week: 0.6 oz     Types: 1 Glasses of wine per week     Comment: on rare ocassion   • Drug use: No     Family History   Problem Relation Age of Onset   • Diabetes Father    • Hypertension Father    • Hyperlipidemia Father    • Stroke Father    • Lung Disease Mother 68        Emphazema   • Heart Disease Mother         CHF   • Cancer Brother 55        liver cancer   • Other Brother         Melanoma   • Cancer Brother 55        Prostate cancer   • Heart Disease Maternal Grandmother 63     She  has a past medical history of Cancer (HCC) (2007), Epigastric abdominal pain of unknown etiology (7/12/2019), Exudative age-related macular degeneration of left eye with active choroidal neovascularization (HCC) (7/27/2020), Family history of melanoma (3/11/2013), Flat foot(734) (3/17/2013), Heart burn, Herniated cervical disc, 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   Past Surgical History:   Procedure Laterality Date   •  "VITRECTOMY POSTERIOR Left 12/17/2019    Procedure: REMOVAL, VITREOUS BODY, POSTERIOR PORTION-MEMBRANE PEEL POSSIBLE LASER GAS OR OIL;  Surgeon: Arlyn Dawson M.D.;  Location: SURGERY SAME DAY [facility];  Service: Ophthalmology   • THYROIDECTOMY TOTAL  11/19/2014    Performed by Lukas De Santiago M.D. at SURGERY SAME DAY [facility]   • BICEPS TENODESIS  5/22/2014    Performed by Cain Gerardo M.D. at SURGERY [facility]   • SHOULDER ARTHROSCOPY W/ ROTATOR CUFF REPAIR  5/22/2014    Performed by Cain Gerardo M.D. at [facility]   • SHOULDER DECOMPRESSION ARTHROSCOPIC  5/22/2014    Performed by Cain Gerardo M.D. at [facility]   • CLAVICLE DISTAL EXCISION  5/22/2014    Performed by Cain Gerardo M.D. at [facility]   • TENDON [facility] FOOT  [facility]    • OTHER ORTHOPEDIC SURGERY  2006    reconstruction  left foot   • BUNIONECTOMY  1988   • CHOLECYSTECTOMY  1988   • TUBAL LIGATION  1982   • CATARACT PHACO WITH IOL      Ry   • GYN SURGERY      tubal   • TONSILLECTOMY     • US-NEEDLE CORE BX-BREAST PANEL         Exam:   /60 (BP Location: Right arm, Patient Position: Sitting, BP Cuff Size: Adult)   Pulse 67   Temp 36.5 °C (97.7 °F) (Temporal)   Resp 12   Ht 1.6 m (5' 3\")   Wt 77.7 kg (171 lb 3.2 oz)   SpO2 96%  Body mass index is 30.33 kg/m².    Hearing excellent.    Dentition good  Alert, oriented in no acute distress.  Eye contact is good, speech goal directed, affect calm  Physical Exam     Assessment and Plan. The following treatment and monitoring plan is recommended:      1. Encounter for annual wellness exam in Medicare patient  -All question concerns were answered.  Reviewed all screenings and vaccinations as well as labs. patient is up-to-date.  Recommend follow-up for annual wellness visit on a yearly basis.    2. Exudative age-related macular degeneration of left eye with active choroidal neovascularization (HCC)  -This problem is " chronic, stable, and monitored appropriately by history/labs/imaging as needed, and is well-controlled on the current regimen.  Please continue current regimen: Continue follow-up with Dr. Jerez    3. Primary insomnia  -This problem is chronic, stable, and monitored appropriately by history/labs/imaging as needed, and is well-controlled on the current regimen.  Please continue current regimen.  Patient been taking amitriptyline; however, since stopping statin, leg cramps have improved and patient sleeping better, requiring less amitriptyline.  Continue with current regimen.  Follow-up as needed.    4. Prediabetes  -This problem is chronic, stable, and monitored appropriately by history/labs/imaging as needed, and is well-controlled on the current regimen.  Please continue current regimen.  Appropriate diet, exercise regimen.    5. Osteopenia, unspecified location  -This problem is chronic, stable, and monitored appropriately by history/labs/imaging as needed, and is well-controlled on the current regimen.  Please continue current regimen: Vitamin supplementation.  -Upon review of patient's records patient has longstanding history of osteopenia with elevated FRAX score, underwent 2 rounds of Reclast in 2013 2014 which helped improve her T score.  Patient here today to review DEXA scan, DEXA scan remained stable with signs of osteopenia but unchanged FRAX score.  At this time we will continue with vitamin supplementation, weightbearing exercises and will follow up with new DEXA in 1 year.  If at that time FRAX score is worsening then we we will discuss repeating Reclast injections.    6. Hypothyroidism (acquired)  -This problem is chronic, stable, and monitored appropriately by history/labs/imaging as needed, and is well-controlled on the current regimen.  Please continue current regimen: Thyroxine 112 mcg.  Labs completed earlier this year show stable TSH.    7. Pure hypercholesterolemia  -This is been uncontrolled  problem since stopping the simvastatin due to myalgias.  At this time, given an increased cholesterol levels despite appropriate diet and exercise regiment I do recommend we switch statins to see if she is able to tolerate treatment again.  We will switch at this time to atorvastatin which she will begin taking at bedtime.  Discussed possible side effects.  Patient will follow-up as needed.  - atorvastatin (LIPITOR) 10 MG Tab; Take 1 Tablet by mouth every day for 90 days.  Dispense: 90 Tablet; Refill: 3    8. Bilateral hearing loss, unspecified hearing loss type  -This problem is chronic, stable, and monitored appropriately by history/labs/imaging as needed, and is well-controlled on the current regimen.  Please continue current regimen. Follow up as needed.     9. S/P dilatation of esophageal stricture  -Patient has a history of esophageal stricture.  She is planning on repeat dilation at the beginning of next year.    10. Corn of toe  -Given findings on physical exam today I will refer to podiatry for further evaluation and treatment of corn.  - Referral to Podiatry      Services suggested: No services needed at this time  Health Care Screening: Age-appropriate preventive services recommended by USPTF and ACIP covered by Medicare were discussed today. Services ordered if indicated and agreed upon by the patient.  Referrals offered: Community-based lifestyle interventions to reduce health risks and promote self-management and wellness, fall prevention, nutrition, physical activity, tobacco-use cessation, weight loss, and mental health services as per orders if indicated.    Discussion today about general wellness and lifestyle habits:    · Prevent falls and reduce trip hazards; Cautioned about securing or removing rugs.  · Have a working fire alarm and carbon monoxide detector;   · Engage in regular physical activity and social activities     Follow-up: Return in about 1 year (around 11/1/2022), or if symptoms  worsen or fail to improve.      PLEASE NOTE: This dictation was created using voice recognition software. I have made every reasonable attempt to correct obvious errors, but I expect that there are errors of grammar and possibly content that I did not discover before finalizing the note.

## 2021-11-01 NOTE — PROGRESS NOTES
Subjective:     CC:   Chief Complaint   Patient presents with   • Follow-Up     4 months   • Lab Results   • Other     AHA       HPI:   Yoselin Jorge is a 76 y.o. female who presents for annual exam    Patient has GYN provider: No   Last Pap Smear: years   H/O Abnormal Pap: Patient cannot recall  Last Mammogram: 2 months ago   Last Bone Density Test: 1 year ago   Last Colorectal Cancer Screening: needs?  Last Tdap: 2020  Received HPV series: Yes    Exercise: minimal exercise, one hour walking weekly  Diet: good       {MENSTRUATING / MENOPAUSAL:74447}    OB History    Para Term  AB Living   2 2 0 0 0 2   SAB TAB Ectopic Molar Multiple Live Births   0 0 0 0 0 0      She  reports never being sexually active.    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  She  has a past surgical history that includes tonsillectomy; tendon release foot (2013); cataract phaco with iol; bunionectomy (); tubal ligation (); biceps tenodesis (2014); thyroidectomy total (2014); us-needle core bx-breast panel; shoulder arthroscopy w/ rotator cuff repair (2014); shoulder decompression arthroscopic (2014); clavicle distal excision (2014); other orthopedic  surgery (2006); gyn surgery; cholecystectomy (1988); and vitrectomy posterior (Left, 12/17/2019).    Family History   Problem Relation Age of Onset   • Diabetes Father    • Hypertension Father    • Hyperlipidemia Father    • Stroke Father    • Lung Disease Mother 68        Emphazema   • Heart Disease Mother         CHF   • Cancer Brother 55        liver cancer   • Other Brother         Melanoma   • Cancer Brother 55        Prostate cancer   • Heart Disease Maternal Grandmother 63     Social History     Tobacco Use   • Smoking status: Never Smoker   • Smokeless tobacco: Never Used   Vaping Use   • Vaping Use: Never used   Substance Use Topics   • Alcohol use: Yes     Alcohol/week: 0.6 oz     Types: 1 Glasses of wine per week     Comment: on rare ocassion   • Drug use: No       Patient Active Problem List    Diagnosis Date Noted   • Primary insomnia 11/09/2020   • Exudative age-related macular degeneration of left eye with active choroidal neovascularization (HCC) 07/27/2020   • Menopause 07/27/2020   • Prediabetes 05/11/2017   • S/P thyroidectomy 05/22/2015   • S/P dilatation of esophageal stricture 05/16/2015   • Swallowing difficulty 01/13/2015   • Hypothyroidism (acquired) 01/13/2015   • Osteopenia 03/30/2013   • Hearing loss 03/17/2013   • Hyperlipidemia 03/11/2013   • History of nasopharyngeal cancer 03/11/2013   • Family history of melanoma 03/11/2013     Current Outpatient Medications   Medication Sig Dispense Refill   • levothyroxine (SYNTHROID) 112 MCG Tab      • amitriptyline (ELAVIL) 25 MG Tab      • Multiple Vitamins-Minerals (WOMENS BONE HEALTH PO) Take  by mouth.       No current facility-administered medications for this visit.     No Known Allergies    Review of Systems ***  Constitutional: Negative for fever, chills and malaise/fatigue.   HENT: Negative for congestion.    Eyes: Negative for pain.   Respiratory: Negative for cough and shortness of breath.    Cardiovascular: Negative for chest pain and  "leg swelling.   Gastrointestinal: Negative for nausea, vomiting, abdominal pain and diarrhea.   Genitourinary: Negative for dysuria and hematuria.   Skin: Negative for rash.   Neurological: Negative for dizziness, focal weakness and headaches.   Endo/Heme/Allergies: Does not bruise/bleed easily.   Psychiatric/Behavioral: Negative for depression.  The patient is not nervous/anxious.      Objective:   /60 (BP Location: Right arm, Patient Position: Sitting, BP Cuff Size: Adult)   Pulse 67   Temp 36.5 °C (97.7 °F) (Temporal)   Resp 12   Ht 1.6 m (5' 3\")   Wt 77.7 kg (171 lb 3.2 oz)   LMP 08/01/1995   SpO2 96%   BMI 30.33 kg/m²     Wt Readings from Last 4 Encounters:   11/01/21 77.7 kg (171 lb 3.2 oz)   06/16/21 77.6 kg (171 lb)   11/09/20 77.6 kg (171 lb)   09/03/20 76.7 kg (169 lb)       {Chaperone Offered? (Optional):31374}    Physical Exam:***  Constitutional: Well-developed and well-nourished. Not diaphoretic. No distress.   Skin: Skin is warm and dry. No rash noted.  Head: Atraumatic without lesions.  Eyes: Conjunctivae and extraocular motions are normal. Pupils are equal, round, and reactive to light. No scleral icterus.   Ears:  External ears unremarkable. Tympanic membranes clear and intact.  Nose: Nares patent. Septum midline. Turbinates without erythema nor edema. No discharge.   Mouth/Throat: Tongue normal. Oropharynx is clear and moist. Posterior pharynx without erythema or exudates.  Neck: Supple, trachea midline. Normal range of motion. No thyromegaly present. No lymphadenopathy--cervical or supraclavicular.  Cardiovascular: Regular rate and rhythm, S1 and S2 without murmur, rubs, or gallops.    Respiratory: Effort normal. Clear to auscultation throughout. No adventitious sounds.   {BREAST EXAM (Optional):86342}  Abdomen: Soft, non tender, and without distention. Active bowel sounds in all four quadrants. No rebound, guarding, masses or HSM.  {PELVIC EXAM (Optional):39085}  Extremities: No " cyanosis, clubbing, erythema, nor edema. Distal pulses intact and symmetric.   Musculoskeletal: All major joints AROM full in all directions without pain.  Neurological: Alert and oriented x 3. Grossly non-focal. Strength and sensation grossly intact. DTRs 2+/3 and symmetric.   Psychiatric:  Behavior, mood, and affect are appropriate.    Assessment and Plan:     1. Encounter for annual wellness exam in Medicare patient    2. Exudative age-related macular degeneration of left eye with active choroidal neovascularization (HCC)    3. Primary insomnia    4. Prediabetes    5. Osteopenia, unspecified location    6. Hypothyroidism (acquired)    7. Pure hypercholesterolemia    8. Bilateral hearing loss, unspecified hearing loss type      Health maintenance:  ***   Labs per orders  Immunizations per orders  Patient counseled about skin care, diet, supplements, and exercise.  Discussed  {GYNCOUNSEL:69578}     Follow-up: No follow-ups on file.

## 2021-11-15 ENCOUNTER — TELEPHONE (OUTPATIENT)
Dept: MEDICAL GROUP | Facility: LAB | Age: 76
End: 2021-11-15

## 2021-11-15 NOTE — TELEPHONE ENCOUNTER
----- Message from Yoselin Jorge sent at 11/13/2021  4:12 PM PST -----  Regarding: New prescription   [facility] filled the Atorvastatin  but only for 30 day supply. Could you enter a new prescription for a 90 day supply (I believe [facility] requires it be written for 100 tablets.). We will be traveling until mid January and would need to  before Friday the 19th. Thank you in advance.

## 2022-01-12 ENCOUNTER — PATIENT MESSAGE (OUTPATIENT)
Dept: MEDICAL GROUP | Facility: LAB | Age: 77
End: 2022-01-12

## 2022-01-12 DIAGNOSIS — E03.9 HYPOTHYROIDISM (ACQUIRED): ICD-10-CM

## 2022-01-13 ENCOUNTER — TELEPHONE (OUTPATIENT)
Dept: MEDICAL GROUP | Facility: LAB | Age: 77
End: 2022-01-13

## 2022-01-13 NOTE — TELEPHONE ENCOUNTER
ESTABLISHED PATIENT PRE-VISIT PLANNING     Patient was NOT contacted to complete PVP.     Note: Patient will not be contacted if there is no indication to call.     1.  Reviewed notes from the last few office visits within the medical group: Yes    2.  If any orders were placed at last visit or intended to be done for this visit (i.e. 6 mos follow-up), do we have Results/Consult Notes?         •  Labs - Labs were not ordered at last office visit.  Note: If patient appointment is for lab review and patient did not complete labs, check with provider if OK to reschedule patient until labs completed.       •  Imaging - Imaging was not ordered at last office visit.       •  Referrals - Referral ordered, patient was seen and consult notes were requested from Dr. Schmidt . Care Teams updated  YES.    3. Is this appointment scheduled as a Hospital Follow-Up? No    4.  Immunizations were updated in Epic using Reconcile Outside Information activity? Yes    5.  Patient is due for the following Health Maintenance Topics:   There are no preventive care reminders to display for this patient.      6.  AHA (Pulse8) form printed for Provider? No, already completed

## 2022-01-17 ENCOUNTER — HOSPITAL ENCOUNTER (OUTPATIENT)
Dept: LAB | Facility: MEDICAL CENTER | Age: 77
End: 2022-01-17
Attending: FAMILY MEDICINE
Payer: MEDICARE

## 2022-01-17 ENCOUNTER — OFFICE VISIT (OUTPATIENT)
Dept: MEDICAL GROUP | Facility: LAB | Age: 77
End: 2022-01-17
Payer: MEDICARE

## 2022-01-17 VITALS
DIASTOLIC BLOOD PRESSURE: 76 MMHG | HEART RATE: 62 BPM | BODY MASS INDEX: 30.3 KG/M2 | HEIGHT: 63 IN | TEMPERATURE: 97.4 F | SYSTOLIC BLOOD PRESSURE: 132 MMHG | WEIGHT: 171 LBS | OXYGEN SATURATION: 96 % | RESPIRATION RATE: 13 BRPM

## 2022-01-17 DIAGNOSIS — E03.9 HYPOTHYROIDISM (ACQUIRED): ICD-10-CM

## 2022-01-17 DIAGNOSIS — R03.0 ELEVATED BLOOD PRESSURE READING: ICD-10-CM

## 2022-01-17 LAB
T4 FREE SERPL-MCNC: 1.08 NG/DL (ref 0.93–1.7)
TSH SERPL DL<=0.005 MIU/L-ACNC: 10.7 UIU/ML (ref 0.38–5.33)

## 2022-01-17 PROCEDURE — 84443 ASSAY THYROID STIM HORMONE: CPT

## 2022-01-17 PROCEDURE — 36415 COLL VENOUS BLD VENIPUNCTURE: CPT

## 2022-01-17 PROCEDURE — 99213 OFFICE O/P EST LOW 20 MIN: CPT | Performed by: FAMILY MEDICINE

## 2022-01-17 PROCEDURE — 84439 ASSAY OF FREE THYROXINE: CPT

## 2022-01-17 ASSESSMENT — PATIENT HEALTH QUESTIONNAIRE - PHQ9: CLINICAL INTERPRETATION OF PHQ2 SCORE: 0

## 2022-01-17 ASSESSMENT — FIBROSIS 4 INDEX: FIB4 SCORE: 0.78

## 2022-01-17 NOTE — PROGRESS NOTES
Subjective:   Yoselin Jorge is a 76 y.o. female here today for   Chief Complaint   Patient presents with   • Hypertension Follow-up     Recently high readings than normal        #Elevated blood pressure:  -Patient states that over the last few months she has noticed occasional elevated blood pressure reading when taking blood pressure at home.  She states she is seeing numbers in the 170s/80 to 150s/80.  Patient is asymptomatic, denies any headaches, changes in vision, chest pain, shortness of breath, abdominal pain.  She had no changes to her diet or exercise regiment.  Does have past medical history of postsurgical hypothyroidism currently on levothyroxine 112 mcg daily.  She states that she is compliant with medication.  Denies any changes to her hair or nails, tremors, diarrhea, constipation, fatigue.      No Known Allergies      Current medicines (including changes today)  Current Outpatient Medications   Medication Sig Dispense Refill   • atorvastatin (LIPITOR) 10 MG Tab Take 1 Tablet by mouth every day for 90 days. 100 Tablet 2   • levothyroxine (SYNTHROID) 112 MCG Tab      • amitriptyline (ELAVIL) 25 MG Tab      • Multiple Vitamins-Minerals (WOMENS BONE HEALTH PO) Take  by mouth.       No current facility-administered medications for this visit.     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    ROS   -See above.        Objective:     Physical Exam:  /76   Pulse 62   Temp 36.3 °C (97.4 °F) (Temporal)   Resp 13   Ht 1.6 m (5' 2.99\")   Wt 77.6 kg (171 lb)   SpO2 96%  Body mass index is 30.3 kg/m².   Constitutional: Alert, no distress.  Skin: Warm, dry, good turgor, no rashes in visible areas.  Eye: Equal, round and reactive, conjunctiva clear, lids normal.  Neck: Trachea midline, no masses, no thyromegaly. No cervical or supraclavicular lymphadenopathy.  Respiratory: Unlabored respiratory effort, lungs clear to auscultation, no wheezes, no rhonchi.  Cardiovascular: Normal S1, S2, no murmur, no edema.  Abdomen: Soft, non-tender, no masses, no hepatosplenomegaly.  Psych: Alert and oriented x3, normal affect and mood.    Assessment and Plan:     1. Elevated blood pressure reading  -Blood pressure today reading at 132/76, well within normal parameters for blood pressure.  Discussed with patient the importance of taking blood pressure in the right way with at home electronic blood pressure cuff.  Also discussed the importance of daily exercise, diet.  She will continue to check blood pressures once to twice a week.  We will also recheck TSH to assure that blood pressure elevation is not secondary to iatrogenic hyperthyroidism.  Patient will contact if she begins experiencing any concerning symptoms.    2. Hypothyroidism (acquired)  -See above.  - TSH WITH REFLEX TO FT4; Future      Followup: Return if symptoms worsen or fail to improve.         PLEASE NOTE: This dictation was created using voice recognition software. I have made every reasonable attempt to correct obvious errors, but I expect that there are errors of grammar and possibly content that I did not discover before finalizing the note.  "

## 2022-02-24 ENCOUNTER — PATIENT MESSAGE (OUTPATIENT)
Dept: HEALTH INFORMATION MANAGEMENT | Facility: OTHER | Age: 77
End: 2022-02-24
Payer: MEDICARE

## 2022-04-14 ENCOUNTER — HOSPITAL ENCOUNTER (OUTPATIENT)
Dept: LAB | Facility: MEDICAL CENTER | Age: 77
End: 2022-04-14
Attending: FAMILY MEDICINE
Payer: MEDICARE

## 2022-04-14 DIAGNOSIS — E03.9 HYPOTHYROIDISM (ACQUIRED): ICD-10-CM

## 2022-04-14 LAB
T3 SERPL-MCNC: 113 NG/DL (ref 60–181)
T4 FREE SERPL-MCNC: 1.64 NG/DL (ref 0.93–1.7)
TSH SERPL DL<=0.005 MIU/L-ACNC: 0.15 UIU/ML (ref 0.38–5.33)

## 2022-04-14 PROCEDURE — 84439 ASSAY OF FREE THYROXINE: CPT

## 2022-04-14 PROCEDURE — 84480 ASSAY TRIIODOTHYRONINE (T3): CPT

## 2022-04-14 PROCEDURE — 36415 COLL VENOUS BLD VENIPUNCTURE: CPT

## 2022-04-14 PROCEDURE — 84443 ASSAY THYROID STIM HORMONE: CPT

## 2022-04-18 RX ORDER — AMITRIPTYLINE HYDROCHLORIDE 25 MG/1
TABLET, FILM COATED ORAL
Qty: 100 TABLET | Refills: 0 | Status: SHIPPED | OUTPATIENT
Start: 2022-04-18 | End: 2022-08-24

## 2022-04-18 NOTE — TELEPHONE ENCOUNTER
Received request via: Pharmacy    Was the patient seen in the last year in this department? Yes  1/17/2022  Does the patient have an active prescription (recently filled or refills available) for medication(s) requested? No

## 2022-07-15 ENCOUNTER — TELEPHONE (OUTPATIENT)
Dept: MEDICAL GROUP | Facility: LAB | Age: 77
End: 2022-07-15
Payer: MEDICARE

## 2022-07-27 ENCOUNTER — TELEPHONE (OUTPATIENT)
Dept: HEALTH INFORMATION MANAGEMENT | Facility: OTHER | Age: 77
End: 2022-07-27
Payer: MEDICARE

## 2022-08-24 RX ORDER — AMITRIPTYLINE HYDROCHLORIDE 25 MG/1
TABLET, FILM COATED ORAL
Qty: 100 TABLET | Refills: 0 | Status: SHIPPED | OUTPATIENT
Start: 2022-08-24 | End: 2022-09-15

## 2022-09-15 PROBLEM — G47.00 INSOMNIA: Status: ACTIVE | Noted: 2020-11-09

## 2022-09-17 ENCOUNTER — PATIENT MESSAGE (OUTPATIENT)
Dept: MEDICAL GROUP | Facility: LAB | Age: 77
End: 2022-09-17
Payer: MEDICARE

## 2022-09-17 DIAGNOSIS — E03.9 HYPOTHYROIDISM (ACQUIRED): ICD-10-CM

## 2022-09-17 DIAGNOSIS — E78.00 PURE HYPERCHOLESTEROLEMIA: ICD-10-CM

## 2022-09-19 ENCOUNTER — TELEPHONE (OUTPATIENT)
Dept: MEDICAL GROUP | Facility: LAB | Age: 77
End: 2022-09-19
Payer: MEDICARE

## 2022-09-20 ENCOUNTER — HOSPITAL ENCOUNTER (OUTPATIENT)
Dept: LAB | Facility: MEDICAL CENTER | Age: 77
End: 2022-09-20
Attending: FAMILY MEDICINE
Payer: MEDICARE

## 2022-09-20 DIAGNOSIS — E78.00 PURE HYPERCHOLESTEROLEMIA: ICD-10-CM

## 2022-09-20 DIAGNOSIS — E03.9 HYPOTHYROIDISM (ACQUIRED): ICD-10-CM

## 2022-09-20 LAB
ALBUMIN SERPL BCP-MCNC: 4.1 G/DL (ref 3.2–4.9)
ALBUMIN/GLOB SERPL: 1.5 G/DL
ALP SERPL-CCNC: 91 U/L (ref 30–99)
ALT SERPL-CCNC: 18 U/L (ref 2–50)
ANION GAP SERPL CALC-SCNC: 8 MMOL/L (ref 7–16)
AST SERPL-CCNC: 20 U/L (ref 12–45)
BILIRUB SERPL-MCNC: 0.4 MG/DL (ref 0.1–1.5)
BUN SERPL-MCNC: 13 MG/DL (ref 8–22)
CALCIUM SERPL-MCNC: 9.3 MG/DL (ref 8.5–10.5)
CHLORIDE SERPL-SCNC: 107 MMOL/L (ref 96–112)
CHOLEST SERPL-MCNC: 163 MG/DL (ref 100–199)
CO2 SERPL-SCNC: 25 MMOL/L (ref 20–33)
CREAT SERPL-MCNC: 0.66 MG/DL (ref 0.5–1.4)
FASTING STATUS PATIENT QL REPORTED: NORMAL
GFR SERPLBLD CREATININE-BSD FMLA CKD-EPI: 91 ML/MIN/1.73 M 2
GLOBULIN SER CALC-MCNC: 2.8 G/DL (ref 1.9–3.5)
GLUCOSE SERPL-MCNC: 93 MG/DL (ref 65–99)
HDLC SERPL-MCNC: 58 MG/DL
LDLC SERPL CALC-MCNC: 90 MG/DL
POTASSIUM SERPL-SCNC: 4.5 MMOL/L (ref 3.6–5.5)
PROT SERPL-MCNC: 6.9 G/DL (ref 6–8.2)
SODIUM SERPL-SCNC: 140 MMOL/L (ref 135–145)
T3 SERPL-MCNC: 90.9 NG/DL (ref 60–181)
T4 FREE SERPL-MCNC: 1.42 NG/DL (ref 0.93–1.7)
TRIGL SERPL-MCNC: 75 MG/DL (ref 0–149)
TSH SERPL DL<=0.005 MIU/L-ACNC: 1.02 UIU/ML (ref 0.38–5.33)

## 2022-09-20 PROCEDURE — 84439 ASSAY OF FREE THYROXINE: CPT

## 2022-09-20 PROCEDURE — 80061 LIPID PANEL: CPT

## 2022-09-20 PROCEDURE — 84443 ASSAY THYROID STIM HORMONE: CPT

## 2022-09-20 PROCEDURE — 80053 COMPREHEN METABOLIC PANEL: CPT

## 2022-09-20 PROCEDURE — 36415 COLL VENOUS BLD VENIPUNCTURE: CPT

## 2022-09-20 PROCEDURE — 84480 ASSAY TRIIODOTHYRONINE (T3): CPT

## 2022-11-01 ENCOUNTER — APPOINTMENT (OUTPATIENT)
Dept: RADIOLOGY | Facility: MEDICAL CENTER | Age: 77
End: 2022-11-01
Attending: FAMILY MEDICINE
Payer: MEDICARE

## 2022-11-01 DIAGNOSIS — Z12.31 VISIT FOR SCREENING MAMMOGRAM: ICD-10-CM

## 2022-11-07 ENCOUNTER — OFFICE VISIT (OUTPATIENT)
Dept: MEDICAL GROUP | Facility: LAB | Age: 77
End: 2022-11-07
Payer: MEDICARE

## 2022-11-07 VITALS
TEMPERATURE: 98.2 F | RESPIRATION RATE: 14 BRPM | BODY MASS INDEX: 29.59 KG/M2 | OXYGEN SATURATION: 97 % | WEIGHT: 167 LBS | SYSTOLIC BLOOD PRESSURE: 130 MMHG | HEART RATE: 72 BPM | HEIGHT: 63 IN | DIASTOLIC BLOOD PRESSURE: 60 MMHG

## 2022-11-07 DIAGNOSIS — M85.89 OSTEOPENIA OF MULTIPLE SITES: ICD-10-CM

## 2022-11-07 PROCEDURE — 99213 OFFICE O/P EST LOW 20 MIN: CPT | Performed by: FAMILY MEDICINE

## 2022-11-07 RX ORDER — ATORVASTATIN CALCIUM 10 MG/1
TABLET, FILM COATED ORAL
COMMUNITY
Start: 2022-09-19 | End: 2023-02-21

## 2022-11-07 RX ORDER — CHLORHEXIDINE GLUCONATE ORAL RINSE 1.2 MG/ML
SOLUTION DENTAL
COMMUNITY
Start: 2022-09-12 | End: 2022-11-07

## 2022-11-07 RX ORDER — AMOXICILLIN 500 MG/1
CAPSULE ORAL
COMMUNITY
Start: 2022-09-12 | End: 2022-11-07

## 2022-11-07 RX ORDER — AMITRIPTYLINE HYDROCHLORIDE 25 MG/1
TABLET, FILM COATED ORAL
COMMUNITY
Start: 2022-09-19 | End: 2023-01-20

## 2022-11-07 RX ORDER — HYDROCODONE BITARTRATE AND ACETAMINOPHEN 5; 325 MG/1; MG/1
TABLET ORAL
COMMUNITY
Start: 2022-09-12 | End: 2022-11-07

## 2022-11-07 RX ORDER — CIPROFLOXACIN AND DEXAMETHASONE 3; 1 MG/ML; MG/ML
SUSPENSION/ DROPS AURICULAR (OTIC)
COMMUNITY
Start: 2022-08-11 | End: 2023-10-02

## 2022-11-07 RX ORDER — MELOXICAM 15 MG/1
15 TABLET ORAL DAILY
COMMUNITY
End: 2023-10-02

## 2022-11-07 ASSESSMENT — FIBROSIS 4 INDEX: FIB4 SCORE: 0.93

## 2022-11-08 NOTE — PROGRESS NOTES
Subjective:   Yoselin Jorge is a 77 y.o. female here today for   Chief Complaint   Patient presents with   • Follow-Up     Follow up, from annual 09/15/2022        #Osteopenia:  -Patient recently had completion of annual wellness with geriatric specialty care.  Patient had question regarding bone health.  At that appointment they reviewed bone density scan completed in 2020 which did show increase in bone mineral density from the previous one done in 2017.  Since then patient continues work on calcium, vitamin D supplementation.  Recommend patient follow-up to discuss further review and repeat DEXA with primary care physician.  Patient states she is feeling well, no changes in diet or exercise regiment.    No Known Allergies      Current medicines (including changes today)  Current Outpatient Medications   Medication Sig Dispense Refill   • amitriptyline (ELAVIL) 25 MG Tab      • atorvastatin (LIPITOR) 10 MG Tab      • ciprofloxacin/dexamethasone (CIPRODEX) 0.3-0.1 % Suspension      • TIZANIDINE HCL PO Take  by mouth.     • meloxicam (MOBIC) 15 MG tablet Take 1 Tablet by mouth every day.     • levothyroxine (SYNTHROID) 112 MCG Tab Take 1 Tablet by mouth every morning on an empty stomach.     • Calcium Carb-Cholecalciferol (CALCIUM 500 + D PO) Take  by mouth. 2 chewable tablets daily     • Multiple Vitamins-Minerals (WOMENS BONE HEALTH PO) Take 1 Tablet by mouth every day.       No current facility-administered medications for this visit.     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    ROS   Review of Systems   HENT:  Negative for hearing loss.    Eyes:  Negative for blurred vision.   Respiratory:  Negative for shortness of breath and wheezing.    Cardiovascular:  Negative for chest pain and palpitations.   Musculoskeletal:  Negative for back pain, joint pain and neck pain.        Objective:     Physical Exam:  /60   Pulse 72   Temp 36.8 °C (98.2 °F) (Temporal)   Resp 14   Ht 1.588 m (5' 2.52\")   Wt 75.8 kg (167 lb)   SpO2 97%  Body mass index is 30.04 kg/m².   Constitutional: Alert, no distress.  Skin: Warm, dry, good turgor, no rashes in visible areas.  Eye: Equal, round and reactive, conjunctiva clear, lids normal.  Respiratory: Unlabored respiratory effort  Psych: Alert and oriented x3, normal affect and mood.    Assessment and Plan:     1. Osteopenia of multiple sites  -Stable.  Continue with conservative treatment including vitamin supplementation, weightbearing exercises.  We will repeat DEXA study and follow-up with results with patient.  - DS-BONE DENSITY STUDY (DEXA); Future      Followup: No follow-ups on file.         PLEASE NOTE: This dictation was created using voice recognition software. I have made every reasonable attempt to correct obvious errors, but I expect that there are errors of grammar and possibly content that I did not discover before finalizing the note.  "

## 2022-11-09 ENCOUNTER — HOSPITAL ENCOUNTER (OUTPATIENT)
Dept: RADIOLOGY | Facility: MEDICAL CENTER | Age: 77
End: 2022-11-09
Attending: FAMILY MEDICINE
Payer: MEDICARE

## 2022-11-09 DIAGNOSIS — M85.89 OSTEOPENIA OF MULTIPLE SITES: ICD-10-CM

## 2022-11-09 PROCEDURE — 77080 DXA BONE DENSITY AXIAL: CPT

## 2022-11-29 ASSESSMENT — ENCOUNTER SYMPTOMS
PALPITATIONS: 0
BLURRED VISION: 0
WHEEZING: 0
BACK PAIN: 0
SHORTNESS OF BREATH: 0
NECK PAIN: 0

## 2022-12-30 ENCOUNTER — DOCUMENTATION (OUTPATIENT)
Dept: HEALTH INFORMATION MANAGEMENT | Facility: OTHER | Age: 77
End: 2022-12-30
Payer: MEDICARE

## 2023-01-03 ENCOUNTER — APPOINTMENT (OUTPATIENT)
Dept: RADIOLOGY | Facility: MEDICAL CENTER | Age: 78
End: 2023-01-03
Attending: FAMILY MEDICINE
Payer: MEDICARE

## 2023-01-03 DIAGNOSIS — Z12.31 VISIT FOR SCREENING MAMMOGRAM: ICD-10-CM

## 2023-01-18 ENCOUNTER — HOSPITAL ENCOUNTER (OUTPATIENT)
Dept: RADIOLOGY | Facility: MEDICAL CENTER | Age: 78
End: 2023-01-18
Attending: FAMILY MEDICINE
Payer: MEDICARE

## 2023-01-18 DIAGNOSIS — Z12.31 VISIT FOR SCREENING MAMMOGRAM: ICD-10-CM

## 2023-01-18 PROCEDURE — 77063 BREAST TOMOSYNTHESIS BI: CPT

## 2023-01-20 RX ORDER — AMITRIPTYLINE HYDROCHLORIDE 25 MG/1
TABLET, FILM COATED ORAL
Qty: 100 TABLET | Refills: 1 | Status: SHIPPED | OUTPATIENT
Start: 2023-01-20 | End: 2023-08-03

## 2023-01-20 NOTE — TELEPHONE ENCOUNTER
Received request via: Pharmacy    Was the patient seen in the last year in this department? Yes    Does the patient have an active prescription (recently filled or refills available) for medication(s) requested? No    Does the patient have prison Plus and need 100 day supply (blood pressure, diabetes and cholesterol meds only)? Yes, quantity updated to 100 days    Amitriptyline HCl Oral Tablet 25 MG

## 2023-02-21 RX ORDER — ATORVASTATIN CALCIUM 10 MG/1
TABLET, FILM COATED ORAL
Qty: 100 TABLET | Refills: 3 | Status: SHIPPED | OUTPATIENT
Start: 2023-02-21 | End: 2023-12-20

## 2023-07-18 ENCOUNTER — TELEPHONE (OUTPATIENT)
Dept: HEALTH INFORMATION MANAGEMENT | Facility: OTHER | Age: 78
End: 2023-07-18
Payer: MEDICARE

## 2023-08-03 RX ORDER — AMITRIPTYLINE HYDROCHLORIDE 25 MG/1
TABLET, FILM COATED ORAL
Qty: 100 TABLET | Refills: 0 | Status: SHIPPED | OUTPATIENT
Start: 2023-08-03 | End: 2023-11-10

## 2023-08-03 NOTE — TELEPHONE ENCOUNTER
Received request via: Pharmacy    Was the patient seen in the last year in this department? Yes  11/7/22  Does the patient have an active prescription (recently filled or refills available) for medication(s) requested? No    Does the patient have skilled nursing Plus and need 100 day supply (blood pressure, diabetes and cholesterol meds only)? Medication is not for cholesterol, blood pressure or diabetes

## 2023-08-07 PROBLEM — I73.9 PERIPHERAL VASCULAR DISEASE, UNSPECIFIED (HCC): Status: ACTIVE | Noted: 2023-08-07

## 2023-08-07 PROBLEM — E66.3 OVERWEIGHT WITH BODY MASS INDEX (BMI) OF 29 TO 29.9 IN ADULT: Status: ACTIVE | Noted: 2023-08-07

## 2023-08-17 ENCOUNTER — OFFICE VISIT (OUTPATIENT)
Dept: MEDICAL GROUP | Facility: LAB | Age: 78
End: 2023-08-17
Payer: MEDICARE

## 2023-08-17 VITALS
BODY MASS INDEX: 29.23 KG/M2 | SYSTOLIC BLOOD PRESSURE: 120 MMHG | RESPIRATION RATE: 16 BRPM | DIASTOLIC BLOOD PRESSURE: 56 MMHG | WEIGHT: 165 LBS | TEMPERATURE: 97.8 F | OXYGEN SATURATION: 95 % | HEIGHT: 63 IN | HEART RATE: 63 BPM

## 2023-08-17 DIAGNOSIS — I73.9 PERIPHERAL VASCULAR DISEASE, UNSPECIFIED (HCC): ICD-10-CM

## 2023-08-17 DIAGNOSIS — M85.859 OSTEOPENIA OF HIP, UNSPECIFIED LATERALITY: ICD-10-CM

## 2023-08-17 DIAGNOSIS — M85.89 OTHER SPECIFIED DISORDERS OF BONE DENSITY AND STRUCTURE, MULTIPLE SITES: ICD-10-CM

## 2023-08-17 PROCEDURE — 3078F DIAST BP <80 MM HG: CPT | Performed by: FAMILY MEDICINE

## 2023-08-17 PROCEDURE — 99214 OFFICE O/P EST MOD 30 MIN: CPT | Performed by: FAMILY MEDICINE

## 2023-08-17 PROCEDURE — 3074F SYST BP LT 130 MM HG: CPT | Performed by: FAMILY MEDICINE

## 2023-08-17 NOTE — PROGRESS NOTES
Subjective:   Yoselin Jorge is a 78 y.o. female here today for   Chief Complaint   Patient presents with    Follow-Up     X Peripheral vascular disease, unspecified (HCC)  Patient has evidence today on quantaFlo testing of abnormal (thirty-one to fifty percent) blood flow restriction in their (left; right) (bilateral) lower (extremity) (extremities).       #Abnormal QuantaFlo:  -Patient follow-up after being seen [facility] that showed an abnormal Quanta flow score.  Patient has history of hyperlipidemia.  Is currently on Lipitor.  Recent labs show cholesterol is well controlled.  Is have history of prediabetes.  No other significant cardiovascular disease.  -Patient states that she is feeling well.  Denies any increased fatigue, exercise intolerance, claudication, lower extremity swelling.    #Osteoporosis/osteopenia:  -Patient has a history of osteopenia with recent next scan completed in 2022 showing the following:  The lumbar spine has a mean bone mineral density of 1.099 g/cm2, with a T score of -0.6 and a Z score of 0.8.     The proximal left femur has a mean bone mineral density of 0.867 g/cm2, with a T score of -1.1 and a Z score of 0.4.     When compared with the most recent study dated 8/27/2020, there has been a 7.3% decrease in the bone mineral density of the lumbar spine and a 1.1% decrease in the bone mineral density of the proximal left femur.     IMPRESSION:     According to the World Health Organization classification, bone mineral density of this patient is normal in the spine and osteopenic in the proximal left femur.     10-year Probability of Fracture:  Major Osteoporotic     17.2%  Hip     4.9%    Patient does have history of Reclast x2 injections, last injection given 2014.  Is working on appropriate calcium, vitamin D intake.  Is working on appropriate exercise regimen.  No significant symptoms at this time.    No Known Allergies      Current medicines (including changes  "today)  Current Outpatient Medications   Medication Sig Dispense Refill    amitriptyline (ELAVIL) 25 MG Tab TAKE ONE TABLET BY MOUTH AT BEDTIME AS NEEDED 100 Tablet 0    atorvastatin (LIPITOR) 10 MG Tab TAKE ONE TABLET BY MOUTH ONE TIME DAILY 100 Tablet 3    levothyroxine (SYNTHROID) 112 MCG Tab TAKE ONE TABLET BY MOUTH EVERY MORNING on an empty stomach 100 Tablet 3    ciprofloxacin/dexamethasone (CIPRODEX) 0.3-0.1 % Suspension  (Patient not taking: Reported on 8/7/2023)      TIZANIDINE HCL PO Take  by mouth. (Patient not taking: Reported on 8/7/2023)      meloxicam (MOBIC) 15 MG tablet Take 1 Tablet by mouth every day. (Patient not taking: Reported on 8/7/2023)      Calcium Carb-Cholecalciferol (CALCIUM 500 + D PO) Take  by mouth. 2 chewable tablets daily      Multiple Vitamins-Minerals (WOMENS BONE HEALTH PO) Take 1 Tablet by mouth every day.       No current facility-administered medications for this visit.     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    ROS   -See HPI     Objective:     Physical Exam:  /56   Pulse 63   Temp 36.6 °C (97.8 °F) (Temporal)   Resp 16   Ht 1.6 m (5' 2.99\")   Wt 74.8 kg (165 lb)   SpO2 95%  Body mass index is 29.24 kg/m².   Constitutional: Alert, no " distress.  Skin: Warm, dry, good turgor, no rashes in visible areas.  Eye: Equal, round and reactive, conjunctiva clear, lids normal.  Respiratory: Unlabored respiratory effort, lungs clear to auscultation, no wheezes, no rhonchi.  Cardiovascular: Normal S1, S2, no murmur, no edema.  Abdomen: Soft, non-tender, no masses, no hepatosplenomegaly.  Psych: Alert and oriented x3, normal affect and mood.    Assessment and Plan:     1. Osteopenia of hip, unspecified laterality  -Given decreased intensity on DEXA scan from [id number] as well as increased FRAX score of 4.9% at hip, patient does meet the potential criteria needed to begin treatment.  At this time patient will continue work on appropriate weightbearing exercise, vitamin/mineral supplementation.  We will repeat bone density scan at the end of this year.  If continue to show decline in bone density then we will go ahead and begin treatment at that time.  We will follow-up with results.  - DS-BONE DENSITY STUDY (DEXA); Future    2. Other specified disorders of bone density and structure, multiple sites  -See #1.  - DS-BONE DENSITY STUDY (DEXA); Future    3. Peripheral vascular disease, unspecified (HCC)  -Reviewed results from recent appointment with geriatric specialty care.  Reviewed QuantaFlo results.  -Patient does not meet any criteria for peripheral vascular disease or peripheral arterial disease. Patient's cholesterol is optimized with statin which we will continue at this time.  Discussed continuation of appropriate diet and exercise regimen and will resolve this new diagnosis at this time.  Strict return precautions were given, patient will follow-up as needed.    My total time spent caring for the patient on the day of the encounter was 30 minutes.   This does not include time spent on separately billable procedures/tests.      Followup: No follow-ups on file.         PLEASE NOTE: This dictation was created using voice recognition software. I have made  every reasonable attempt to correct obvious errors, but I expect that there are errors of grammar and possibly content that I did not discover before finalizing the note.

## 2023-09-22 ENCOUNTER — HOSPITAL ENCOUNTER (OUTPATIENT)
Dept: RADIOLOGY | Facility: MEDICAL CENTER | Age: 78
End: 2023-09-22
Attending: FAMILY MEDICINE
Payer: MEDICARE

## 2023-09-22 DIAGNOSIS — M85.859 OSTEOPENIA OF HIP, UNSPECIFIED LATERALITY: ICD-10-CM

## 2023-09-22 DIAGNOSIS — M85.89 OTHER SPECIFIED DISORDERS OF BONE DENSITY AND STRUCTURE, MULTIPLE SITES: ICD-10-CM

## 2023-09-22 PROCEDURE — 77080 DXA BONE DENSITY AXIAL: CPT

## 2023-09-28 ENCOUNTER — TELEPHONE (OUTPATIENT)
Dept: MEDICAL GROUP | Facility: LAB | Age: 78
End: 2023-09-28
Payer: MEDICARE

## 2023-09-28 NOTE — TELEPHONE ENCOUNTER
ESTABLISHED PATIENT PRE-VISIT PLANNING     Patient was NOT contacted to complete PVP.     Note: Patient will not be contacted if there is no indication to call.     1.  Reviewed notes from the last few office visits within the medical group: Yes    2.  If any orders were placed at last visit or intended to be done for this visit (i.e. 6 mos follow-up), do we have Results/Consult Notes?           Labs - Labs were not ordered at last office visit.  Note: If patient appointment is for lab review and patient did not complete labs, check with provider if OK to reschedule patient until labs completed.         Imaging - Imaging ordered, completed and results are in chart.         Referrals - No referrals were ordered at last office visit.    3. Is this appointment scheduled as a Hospital Follow-Up? No    4.  Immunizations were updated in Epic using Reconcile Outside Information activity? Yes    5.  Patient is due for the following Health Maintenance Topics:   Health Maintenance Due   Topic Date Due    COVID-19 Vaccine (6 - Pfizer series) 01/14/2023    Influenza Vaccine (1) 09/01/2023       6.  AHA (Pulse8) form printed for Provider? N/A

## 2023-10-02 ENCOUNTER — OFFICE VISIT (OUTPATIENT)
Dept: MEDICAL GROUP | Facility: LAB | Age: 78
End: 2023-10-02
Payer: MEDICARE

## 2023-10-02 VITALS
TEMPERATURE: 98 F | OXYGEN SATURATION: 97 % | DIASTOLIC BLOOD PRESSURE: 62 MMHG | SYSTOLIC BLOOD PRESSURE: 150 MMHG | HEIGHT: 63 IN | HEART RATE: 69 BPM | WEIGHT: 163 LBS | BODY MASS INDEX: 28.88 KG/M2 | RESPIRATION RATE: 14 BRPM

## 2023-10-02 DIAGNOSIS — Z91.89 FRACTURE RISK ASSESSMENT SCORE (FRAX) INDICATING GREATER THAN 3% RISK FOR HIP FRACTURE: ICD-10-CM

## 2023-10-02 DIAGNOSIS — M85.89 OSTEOPENIA OF MULTIPLE SITES: ICD-10-CM

## 2023-10-02 DIAGNOSIS — Z91.89 FRACTURE RISK ASSESSMENT SCORE (FRAX) INDICATING GREATER THAN 20% RISK FOR MAJOR OSTEOPOROSIS-RELATED FRACTURE: ICD-10-CM

## 2023-10-02 PROCEDURE — 99214 OFFICE O/P EST MOD 30 MIN: CPT | Performed by: FAMILY MEDICINE

## 2023-10-02 PROCEDURE — 3078F DIAST BP <80 MM HG: CPT | Performed by: FAMILY MEDICINE

## 2023-10-02 PROCEDURE — 3077F SYST BP >= 140 MM HG: CPT | Performed by: FAMILY MEDICINE

## 2023-10-02 RX ORDER — DIPHENHYDRAMINE HYDROCHLORIDE 50 MG/ML
50 INJECTION INTRAMUSCULAR; INTRAVENOUS PRN
OUTPATIENT
Start: 2023-10-09

## 2023-10-02 RX ORDER — SODIUM CHLORIDE 9 MG/ML
INJECTION, SOLUTION INTRAVENOUS CONTINUOUS
OUTPATIENT
Start: 2023-10-09

## 2023-10-02 RX ORDER — ZOLEDRONIC ACID 5 MG/100ML
5 INJECTION, SOLUTION INTRAVENOUS
Qty: 100 ML | Refills: 6
Start: 2023-10-02

## 2023-10-02 RX ORDER — ZOLEDRONIC ACID 5 MG/100ML
5 INJECTION, SOLUTION INTRAVENOUS ONCE
OUTPATIENT
Start: 2023-10-09 | End: 2023-10-09

## 2023-10-02 RX ORDER — 0.9 % SODIUM CHLORIDE 0.9 %
VIAL (ML) INJECTION PRN
OUTPATIENT
Start: 2023-10-09

## 2023-10-02 RX ORDER — EPINEPHRINE 1 MG/ML(1)
0.5 AMPUL (ML) INJECTION PRN
OUTPATIENT
Start: 2023-10-09

## 2023-10-02 RX ORDER — METHYLPREDNISOLONE SODIUM SUCCINATE 125 MG/2ML
125 INJECTION, POWDER, LYOPHILIZED, FOR SOLUTION INTRAMUSCULAR; INTRAVENOUS PRN
OUTPATIENT
Start: 2023-10-09

## 2023-10-02 RX ORDER — 0.9 % SODIUM CHLORIDE 0.9 %
3 VIAL (ML) INJECTION PRN
OUTPATIENT
Start: 2023-10-09

## 2023-10-02 RX ORDER — 0.9 % SODIUM CHLORIDE 0.9 %
10 VIAL (ML) INJECTION PRN
OUTPATIENT
Start: 2023-10-09

## 2023-10-02 RX ORDER — SODIUM FLUORIDE1.1%, POTASSIUM NITRATE 5% 5.8; 57.5 MG/ML; MG/ML
GEL, DENTIFRICE DENTAL
COMMUNITY
Start: 2023-09-19

## 2023-10-02 ASSESSMENT — ENCOUNTER SYMPTOMS
VOMITING: 0
SHORTNESS OF BREATH: 0
PALPITATIONS: 0
FEVER: 0
CHILLS: 0
CONSTIPATION: 0
WHEEZING: 0
ABDOMINAL PAIN: 0
NAUSEA: 0
DIARRHEA: 0

## 2023-10-02 NOTE — PROGRESS NOTES
Subjective:   Yoselin Jorge is a 78 y.o. female here today for   Chief Complaint   Patient presents with    Dexa (Dxa) Consult     Reclast      #Osteopenia:  -Patient here to review recent DEXA scan which shows worsening osteopenia with an elevated FRAX score risk score.  -Has no significant history of of fracture; however, recently had a fall on hip.  Recent MRI completed and followed up with [facility].  -Does have a history of treatment of osteopenia/osteoporosis with Reclast.  Has completed 2 infusions remotely.  -No other signs or symptoms at this time.    No Known Allergies      Current medicines (including changes today)  Current Outpatient Medications   Medication Sig Dispense Refill    SODIUM FLUORIDE 5000 SENSITIVE 1.1-5 % Gel       amitriptyline (ELAVIL) 25 MG Tab TAKE ONE TABLET BY MOUTH AT BEDTIME AS NEEDED 100 Tablet 0    atorvastatin (LIPITOR) 10 MG Tab TAKE ONE TABLET BY MOUTH ONE TIME DAILY 100 Tablet 3    levothyroxine (SYNTHROID) 112 MCG Tab TAKE ONE TABLET BY MOUTH EVERY MORNING on an empty stomach 100 Tablet 3    Calcium Carb-Cholecalciferol (CALCIUM 500 + D PO) Take  by mouth. 2 chewable tablets daily      Multiple Vitamins-Minerals (WOMENS BONE HEALTH PO) Take 1 Tablet by mouth every day.       No current facility-administered medications for this visit.     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    ROS   Review of Systems   Constitutional:  Negative for chills and fever.   Respiratory:  Negative for shortness of breath and wheezing.    Cardiovascular:  Negative for chest pain and palpitations.   Gastrointestinal:  Negative for abdominal pain, constipation, diarrhea, nausea and vomiting.      Objective:     Physical Exam:  BP (!) 150/62   Pulse 69   Temp 36.7 °C (98 °F) (Temporal)   Resp 14   Ht 1.6 m (5' 2.99\")   Wt 73.9 kg (163 lb)   SpO2 97%  Body mass index is 28.88 kg/m².   Constitutional: Alert, no distress, well-groomed.  Skin: No rashes in visible areas.  Eye: Round. Conjunctiva clear, lids normal. No icterus.   ENMT: Lips pink without lesions, good dentition, moist mucous membranes. Phonation normal.  Neck: No masses, no thyromegaly. Moves freely without pain.  Respiratory: Unlabored respiratory effort, no cough or audible wheeze  Psych: Alert and oriented x3, normal affect and mood.     Assessment and Plan:     1. Fracture Risk Assessment Score (FRAX) indicating greater than 20% risk for major osteoporosis-related fracture  -Given increased FRAX score with worsening osteopenia we will need to restart treatment with Reclast at this time.  Discussed risk, benefits, alternatives.  Patient would like to restart Reclast.  We will need to check labs for any secondary causes for worsening osteopenia/osteoporosis.  Once these are completed she will follow-up with patient center on a yearly basis to get this completed.  We will follow-up with repeat DEXA every 1 to 2 years.  - CBC WITHOUT DIFFERENTIAL; Future  - Comp Metabolic Panel; Future  - TSH WITH REFLEX TO FT4; Future  - PTH INTACT (PTH ONLY); Future  - zoledronic Acid (RECLAST) 5 MG/100ML Solution IVPB premix; Infuse 100 mL into a venous catheter Once Every 12 Months.  Dispense: 100 mL; Refill: " 6    2. Fracture Risk Assessment Score (FRAX) indicating greater than 3% risk for hip fracture  -See #1  - CBC WITHOUT DIFFERENTIAL; Future  - Comp Metabolic Panel; Future  - TSH WITH REFLEX TO FT4; Future  - PTH INTACT (PTH ONLY); Future  - zoledronic Acid (RECLAST) 5 MG/100ML Solution IVPB premix; Infuse 100 mL into a venous catheter Once Every 12 Months.  Dispense: 100 mL; Refill: 6    3. Osteopenia of multiple sites  -See #1  - CBC WITHOUT DIFFERENTIAL; Future  - Comp Metabolic Panel; Future  - TSH WITH REFLEX TO FT4; Future  - PTH INTACT (PTH ONLY); Future  - zoledronic Acid (RECLAST) 5 MG/100ML Solution IVPB premix; Infuse 100 mL into a venous catheter Once Every 12 Months.  Dispense: 100 mL; Refill: 6      Followup: No follow-ups on file.         PLEASE NOTE: This dictation was created using voice recognition software. I have made every reasonable attempt to correct obvious errors, but I expect that there are errors of grammar and possibly content that I did not discover before finalizing the note.

## 2023-10-03 ENCOUNTER — HOSPITAL ENCOUNTER (OUTPATIENT)
Dept: LAB | Facility: MEDICAL CENTER | Age: 78
End: 2023-10-03
Attending: FAMILY MEDICINE
Payer: MEDICARE

## 2023-10-03 DIAGNOSIS — Z91.89 FRACTURE RISK ASSESSMENT SCORE (FRAX) INDICATING GREATER THAN 3% RISK FOR HIP FRACTURE: ICD-10-CM

## 2023-10-03 DIAGNOSIS — Z91.89 FRACTURE RISK ASSESSMENT SCORE (FRAX) INDICATING GREATER THAN 20% RISK FOR MAJOR OSTEOPOROSIS-RELATED FRACTURE: ICD-10-CM

## 2023-10-03 DIAGNOSIS — M85.89 OSTEOPENIA OF MULTIPLE SITES: ICD-10-CM

## 2023-10-03 LAB
ALBUMIN SERPL BCP-MCNC: 4.2 G/DL (ref 3.2–4.9)
ALBUMIN/GLOB SERPL: 1.5 G/DL
ALP SERPL-CCNC: 154 U/L (ref 30–99)
ALT SERPL-CCNC: 13 U/L (ref 2–50)
ANION GAP SERPL CALC-SCNC: 10 MMOL/L (ref 7–16)
AST SERPL-CCNC: 16 U/L (ref 12–45)
BILIRUB SERPL-MCNC: 0.4 MG/DL (ref 0.1–1.5)
BUN SERPL-MCNC: 12 MG/DL (ref 8–22)
CALCIUM ALBUM COR SERPL-MCNC: 9.3 MG/DL (ref 8.5–10.5)
CALCIUM SERPL-MCNC: 9.5 MG/DL (ref 8.5–10.5)
CHLORIDE SERPL-SCNC: 107 MMOL/L (ref 96–112)
CO2 SERPL-SCNC: 25 MMOL/L (ref 20–33)
CREAT SERPL-MCNC: 0.68 MG/DL (ref 0.5–1.4)
ERYTHROCYTE [DISTWIDTH] IN BLOOD BY AUTOMATED COUNT: 50.1 FL (ref 35.9–50)
GFR SERPLBLD CREATININE-BSD FMLA CKD-EPI: 89 ML/MIN/1.73 M 2
GLOBULIN SER CALC-MCNC: 2.8 G/DL (ref 1.9–3.5)
GLUCOSE SERPL-MCNC: 81 MG/DL (ref 65–99)
HCT VFR BLD AUTO: 45.5 % (ref 37–47)
HGB BLD-MCNC: 14.3 G/DL (ref 12–16)
MCH RBC QN AUTO: 28.2 PG (ref 27–33)
MCHC RBC AUTO-ENTMCNC: 31.4 G/DL (ref 32.2–35.5)
MCV RBC AUTO: 89.7 FL (ref 81.4–97.8)
PLATELET # BLD AUTO: 378 K/UL (ref 164–446)
PMV BLD AUTO: 10.7 FL (ref 9–12.9)
POTASSIUM SERPL-SCNC: 4.4 MMOL/L (ref 3.6–5.5)
PROT SERPL-MCNC: 7 G/DL (ref 6–8.2)
PTH-INTACT SERPL-MCNC: 40.4 PG/ML (ref 14–72)
RBC # BLD AUTO: 5.07 M/UL (ref 4.2–5.4)
SODIUM SERPL-SCNC: 142 MMOL/L (ref 135–145)
T4 FREE SERPL-MCNC: 1.54 NG/DL (ref 0.93–1.7)
TSH SERPL DL<=0.005 MIU/L-ACNC: 0.08 UIU/ML (ref 0.38–5.33)
WBC # BLD AUTO: 5.2 K/UL (ref 4.8–10.8)

## 2023-10-03 PROCEDURE — 80053 COMPREHEN METABOLIC PANEL: CPT

## 2023-10-03 PROCEDURE — 36415 COLL VENOUS BLD VENIPUNCTURE: CPT

## 2023-10-03 PROCEDURE — 84439 ASSAY OF FREE THYROXINE: CPT

## 2023-10-03 PROCEDURE — 85027 COMPLETE CBC AUTOMATED: CPT

## 2023-10-03 PROCEDURE — 84443 ASSAY THYROID STIM HORMONE: CPT

## 2023-10-03 PROCEDURE — 83970 ASSAY OF PARATHORMONE: CPT

## 2023-11-07 NOTE — TELEPHONE ENCOUNTER
Received request via: Pharmacy    Was the patient seen in the last year in this department? Yes  10/2/23  Does the patient have an active prescription (recently filled or refills available) for medication(s) requested? No    Does the patient have longterm Plus and need 100 day supply (blood pressure, diabetes and cholesterol meds only)? Medication is not for cholesterol, blood pressure or diabetes

## 2023-11-10 RX ORDER — AMITRIPTYLINE HYDROCHLORIDE 25 MG/1
TABLET, FILM COATED ORAL
Qty: 100 TABLET | Refills: 0 | Status: SHIPPED | OUTPATIENT
Start: 2023-11-10 | End: 2024-03-26

## 2023-12-20 RX ORDER — ATORVASTATIN CALCIUM 10 MG/1
TABLET, FILM COATED ORAL
Qty: 100 TABLET | Refills: 3 | Status: SHIPPED | OUTPATIENT
Start: 2023-12-20

## 2024-02-05 ENCOUNTER — PATIENT MESSAGE (OUTPATIENT)
Dept: MEDICAL GROUP | Facility: LAB | Age: 79
End: 2024-02-05
Payer: MEDICARE

## 2024-02-07 RX ORDER — NORTRIPTYLINE HYDROCHLORIDE 25 MG/1
25 CAPSULE ORAL NIGHTLY
Qty: 90 CAPSULE | Refills: 3 | Status: SHIPPED | OUTPATIENT
Start: 2024-02-07

## 2024-02-09 ENCOUNTER — PATIENT MESSAGE (OUTPATIENT)
Dept: MEDICAL GROUP | Facility: LAB | Age: 79
End: 2024-02-09
Payer: MEDICARE

## 2024-02-09 DIAGNOSIS — Z91.89 FRACTURE RISK ASSESSMENT SCORE (FRAX) INDICATING GREATER THAN 20% RISK FOR MAJOR OSTEOPOROSIS-RELATED FRACTURE: ICD-10-CM

## 2024-02-09 DIAGNOSIS — Z91.89 FRACTURE RISK ASSESSMENT SCORE (FRAX) INDICATING GREATER THAN 3% RISK FOR HIP FRACTURE: ICD-10-CM

## 2024-02-13 ENCOUNTER — HOSPITAL ENCOUNTER (OUTPATIENT)
Dept: LAB | Facility: MEDICAL CENTER | Age: 79
End: 2024-02-13
Attending: FAMILY MEDICINE
Payer: MEDICARE

## 2024-02-13 DIAGNOSIS — Z91.89 FRACTURE RISK ASSESSMENT SCORE (FRAX) INDICATING GREATER THAN 3% RISK FOR HIP FRACTURE: ICD-10-CM

## 2024-02-13 DIAGNOSIS — Z91.89 FRACTURE RISK ASSESSMENT SCORE (FRAX) INDICATING GREATER THAN 20% RISK FOR MAJOR OSTEOPOROSIS-RELATED FRACTURE: ICD-10-CM

## 2024-02-13 LAB
ALBUMIN SERPL BCP-MCNC: 3.9 G/DL (ref 3.2–4.9)
ALBUMIN/GLOB SERPL: 1.3 G/DL
ALP SERPL-CCNC: 86 U/L (ref 30–99)
ALT SERPL-CCNC: 18 U/L (ref 2–50)
ANION GAP SERPL CALC-SCNC: 10 MMOL/L (ref 7–16)
AST SERPL-CCNC: 20 U/L (ref 12–45)
BASOPHILS # BLD AUTO: 0.4 % (ref 0–1.8)
BASOPHILS # BLD: 0.02 K/UL (ref 0–0.12)
BILIRUB SERPL-MCNC: 0.4 MG/DL (ref 0.1–1.5)
BUN SERPL-MCNC: 10 MG/DL (ref 8–22)
CALCIUM ALBUM COR SERPL-MCNC: 9.1 MG/DL (ref 8.5–10.5)
CALCIUM SERPL-MCNC: 9 MG/DL (ref 8.5–10.5)
CHLORIDE SERPL-SCNC: 106 MMOL/L (ref 96–112)
CO2 SERPL-SCNC: 25 MMOL/L (ref 20–33)
CREAT SERPL-MCNC: 0.61 MG/DL (ref 0.5–1.4)
EOSINOPHIL # BLD AUTO: 0.14 K/UL (ref 0–0.51)
EOSINOPHIL NFR BLD: 2.7 % (ref 0–6.9)
ERYTHROCYTE [DISTWIDTH] IN BLOOD BY AUTOMATED COUNT: 51.3 FL (ref 35.9–50)
GFR SERPLBLD CREATININE-BSD FMLA CKD-EPI: 92 ML/MIN/1.73 M 2
GLOBULIN SER CALC-MCNC: 2.9 G/DL (ref 1.9–3.5)
GLUCOSE SERPL-MCNC: 96 MG/DL (ref 65–99)
HCT VFR BLD AUTO: 41.6 % (ref 37–47)
HGB BLD-MCNC: 13.4 G/DL (ref 12–16)
IMM GRANULOCYTES # BLD AUTO: 0.01 K/UL (ref 0–0.11)
IMM GRANULOCYTES NFR BLD AUTO: 0.2 % (ref 0–0.9)
LYMPHOCYTES # BLD AUTO: 1.65 K/UL (ref 1–4.8)
LYMPHOCYTES NFR BLD: 32.2 % (ref 22–41)
MCH RBC QN AUTO: 28.5 PG (ref 27–33)
MCHC RBC AUTO-ENTMCNC: 32.2 G/DL (ref 32.2–35.5)
MCV RBC AUTO: 88.3 FL (ref 81.4–97.8)
MONOCYTES # BLD AUTO: 0.35 K/UL (ref 0–0.85)
MONOCYTES NFR BLD AUTO: 6.8 % (ref 0–13.4)
NEUTROPHILS # BLD AUTO: 2.96 K/UL (ref 1.82–7.42)
NEUTROPHILS NFR BLD: 57.7 % (ref 44–72)
NRBC # BLD AUTO: 0 K/UL
NRBC BLD-RTO: 0 /100 WBC (ref 0–0.2)
PLATELET # BLD AUTO: 314 K/UL (ref 164–446)
PMV BLD AUTO: 10.8 FL (ref 9–12.9)
POTASSIUM SERPL-SCNC: 3.9 MMOL/L (ref 3.6–5.5)
PROT SERPL-MCNC: 6.8 G/DL (ref 6–8.2)
RBC # BLD AUTO: 4.71 M/UL (ref 4.2–5.4)
SODIUM SERPL-SCNC: 141 MMOL/L (ref 135–145)
WBC # BLD AUTO: 5.1 K/UL (ref 4.8–10.8)

## 2024-02-13 PROCEDURE — 85025 COMPLETE CBC W/AUTO DIFF WBC: CPT

## 2024-02-13 PROCEDURE — 36415 COLL VENOUS BLD VENIPUNCTURE: CPT

## 2024-02-13 PROCEDURE — 80053 COMPREHEN METABOLIC PANEL: CPT

## 2024-02-15 ENCOUNTER — APPOINTMENT (OUTPATIENT)
Dept: MEDICAL GROUP | Facility: LAB | Age: 79
End: 2024-02-15
Payer: MEDICARE

## 2024-03-26 RX ORDER — AMITRIPTYLINE HYDROCHLORIDE 25 MG/1
TABLET, FILM COATED ORAL
Qty: 100 TABLET | Refills: 0 | Status: SHIPPED | OUTPATIENT
Start: 2024-03-26

## 2024-03-26 NOTE — TELEPHONE ENCOUNTER
Received request via: Pharmacy    Was the patient seen in the last year in this department? Yes  10/2/23  Does the patient have an active prescription (recently filled or refills available) for medication(s) requested? No    Pharmacy Name: [facility]    Does the patient have assisted Plus and need 100 day supply (blood pressure, diabetes and cholesterol meds only)? Medication is not for cholesterol, blood pressure or diabetes

## 2024-03-28 ENCOUNTER — HOSPITAL ENCOUNTER (OUTPATIENT)
Dept: RADIOLOGY | Facility: MEDICAL CENTER | Age: 79
End: 2024-03-28
Attending: OTOLARYNGOLOGY
Payer: MEDICARE

## 2024-03-28 DIAGNOSIS — H61.22 IMPACTED CERUMEN OF LEFT EAR: ICD-10-CM

## 2024-03-28 PROCEDURE — 70480 CT ORBIT/EAR/FOSSA W/O DYE: CPT

## 2024-04-05 RX ORDER — AMITRIPTYLINE HYDROCHLORIDE 25 MG/1
TABLET, FILM COATED ORAL
Qty: 100 TABLET | Refills: 0 | Status: SHIPPED | OUTPATIENT
Start: 2024-04-05

## 2024-04-05 RX ORDER — ATORVASTATIN CALCIUM 10 MG/1
10 TABLET, FILM COATED ORAL DAILY
Qty: 100 TABLET | Refills: 3 | Status: SHIPPED | OUTPATIENT
Start: 2024-04-05

## 2024-04-05 RX ORDER — LEVOTHYROXINE SODIUM 112 UG/1
112 TABLET ORAL
Qty: 100 TABLET | Refills: 3 | Status: SHIPPED | OUTPATIENT
Start: 2024-04-05

## 2024-04-05 NOTE — TELEPHONE ENCOUNTER
Received request via: Pharmacy    Was the patient seen in the last year in this department? Yes    Does the patient have an active prescription (recently filled or refills available) for medication(s) requested? No    [facility]    Does the patient have [facility] and need 100 day supply (blood pressure, diabetes and cholesterol meds only)? Yes, quantity updated to 100 days

## 2024-07-05 ENCOUNTER — HOSPITAL ENCOUNTER (OUTPATIENT)
Dept: LAB | Facility: MEDICAL CENTER | Age: 79
End: 2024-07-05
Attending: FAMILY MEDICINE
Payer: MEDICARE

## 2024-07-05 ENCOUNTER — OFFICE VISIT (OUTPATIENT)
Dept: MEDICAL GROUP | Facility: LAB | Age: 79
End: 2024-07-05
Payer: MEDICARE

## 2024-07-05 VITALS
TEMPERATURE: 98 F | SYSTOLIC BLOOD PRESSURE: 120 MMHG | WEIGHT: 155.7 LBS | DIASTOLIC BLOOD PRESSURE: 62 MMHG | HEIGHT: 62 IN | OXYGEN SATURATION: 96 % | BODY MASS INDEX: 28.65 KG/M2 | HEART RATE: 65 BPM | RESPIRATION RATE: 16 BRPM

## 2024-07-05 DIAGNOSIS — E78.5 DYSLIPIDEMIA: ICD-10-CM

## 2024-07-05 DIAGNOSIS — R25.2 MUSCLE CRAMPING: ICD-10-CM

## 2024-07-05 DIAGNOSIS — R22.1 MASS IN NECK: ICD-10-CM

## 2024-07-05 DIAGNOSIS — E03.9 HYPOTHYROIDISM (ACQUIRED): ICD-10-CM

## 2024-07-05 LAB
ALBUMIN SERPL BCP-MCNC: 4 G/DL (ref 3.2–4.9)
ALBUMIN/GLOB SERPL: 1.3 G/DL
ALP SERPL-CCNC: 101 U/L (ref 30–99)
ALT SERPL-CCNC: 16 U/L (ref 2–50)
ANION GAP SERPL CALC-SCNC: 11 MMOL/L (ref 7–16)
AST SERPL-CCNC: 16 U/L (ref 12–45)
BASOPHILS # BLD AUTO: 0.4 % (ref 0–1.8)
BASOPHILS # BLD: 0.02 K/UL (ref 0–0.12)
BILIRUB SERPL-MCNC: 0.4 MG/DL (ref 0.1–1.5)
BUN SERPL-MCNC: 11 MG/DL (ref 8–22)
CALCIUM ALBUM COR SERPL-MCNC: 9.9 MG/DL (ref 8.5–10.5)
CALCIUM SERPL-MCNC: 9.9 MG/DL (ref 8.5–10.5)
CHLORIDE SERPL-SCNC: 103 MMOL/L (ref 96–112)
CO2 SERPL-SCNC: 25 MMOL/L (ref 20–33)
CREAT SERPL-MCNC: 0.53 MG/DL (ref 0.5–1.4)
EOSINOPHIL # BLD AUTO: 0.08 K/UL (ref 0–0.51)
EOSINOPHIL NFR BLD: 1.5 % (ref 0–6.9)
ERYTHROCYTE [DISTWIDTH] IN BLOOD BY AUTOMATED COUNT: 49.6 FL (ref 35.9–50)
ERYTHROCYTE [SEDIMENTATION RATE] IN BLOOD BY WESTERGREN METHOD: 35 MM/HOUR (ref 0–25)
GFR SERPLBLD CREATININE-BSD FMLA CKD-EPI: 95 ML/MIN/1.73 M 2
GLOBULIN SER CALC-MCNC: 3.1 G/DL (ref 1.9–3.5)
GLUCOSE SERPL-MCNC: 92 MG/DL (ref 65–99)
HCT VFR BLD AUTO: 42.9 % (ref 37–47)
HGB BLD-MCNC: 13.8 G/DL (ref 12–16)
IMM GRANULOCYTES # BLD AUTO: 0.01 K/UL (ref 0–0.11)
IMM GRANULOCYTES NFR BLD AUTO: 0.2 % (ref 0–0.9)
LYMPHOCYTES # BLD AUTO: 1.56 K/UL (ref 1–4.8)
LYMPHOCYTES NFR BLD: 29.3 % (ref 22–41)
MCH RBC QN AUTO: 28.9 PG (ref 27–33)
MCHC RBC AUTO-ENTMCNC: 32.2 G/DL (ref 32.2–35.5)
MCV RBC AUTO: 89.9 FL (ref 81.4–97.8)
MONOCYTES # BLD AUTO: 0.42 K/UL (ref 0–0.85)
MONOCYTES NFR BLD AUTO: 7.9 % (ref 0–13.4)
NEUTROPHILS # BLD AUTO: 3.23 K/UL (ref 1.82–7.42)
NEUTROPHILS NFR BLD: 60.7 % (ref 44–72)
NRBC # BLD AUTO: 0 K/UL
NRBC BLD-RTO: 0 /100 WBC (ref 0–0.2)
PLATELET # BLD AUTO: 408 K/UL (ref 164–446)
PMV BLD AUTO: 10.3 FL (ref 9–12.9)
POTASSIUM SERPL-SCNC: 4.7 MMOL/L (ref 3.6–5.5)
PROT SERPL-MCNC: 7.1 G/DL (ref 6–8.2)
RBC # BLD AUTO: 4.77 M/UL (ref 4.2–5.4)
SODIUM SERPL-SCNC: 139 MMOL/L (ref 135–145)
T4 FREE SERPL-MCNC: 1.47 NG/DL (ref 0.93–1.7)
TSH SERPL DL<=0.005 MIU/L-ACNC: 0.26 UIU/ML (ref 0.38–5.33)
WBC # BLD AUTO: 5.3 K/UL (ref 4.8–10.8)

## 2024-07-05 PROCEDURE — 99214 OFFICE O/P EST MOD 30 MIN: CPT | Performed by: FAMILY MEDICINE

## 2024-07-05 PROCEDURE — 84443 ASSAY THYROID STIM HORMONE: CPT

## 2024-07-05 PROCEDURE — 80053 COMPREHEN METABOLIC PANEL: CPT

## 2024-07-05 PROCEDURE — 3074F SYST BP LT 130 MM HG: CPT | Performed by: FAMILY MEDICINE

## 2024-07-05 PROCEDURE — 85652 RBC SED RATE AUTOMATED: CPT

## 2024-07-05 PROCEDURE — 3078F DIAST BP <80 MM HG: CPT | Performed by: FAMILY MEDICINE

## 2024-07-05 PROCEDURE — 36415 COLL VENOUS BLD VENIPUNCTURE: CPT

## 2024-07-05 PROCEDURE — 85025 COMPLETE CBC W/AUTO DIFF WBC: CPT

## 2024-07-05 PROCEDURE — 84439 ASSAY OF FREE THYROXINE: CPT

## 2024-07-05 RX ORDER — ROSUVASTATIN CALCIUM 5 MG/1
5 TABLET, COATED ORAL EVERY EVENING
Qty: 30 TABLET | Refills: 11 | Status: SHIPPED | OUTPATIENT
Start: 2024-07-05 | End: 2024-07-10 | Stop reason: SDUPTHER

## 2024-07-05 RX ORDER — NYSTATIN 500000 [USP'U]/1
TABLET, COATED ORAL
COMMUNITY
Start: 2024-07-02

## 2024-07-05 RX ORDER — LEVOTHYROXINE SODIUM 0.1 MG/1
100 TABLET ORAL
Qty: 30 TABLET | Refills: 3 | Status: SHIPPED | OUTPATIENT
Start: 2024-07-05

## 2024-07-05 ASSESSMENT — PATIENT HEALTH QUESTIONNAIRE - PHQ9: CLINICAL INTERPRETATION OF PHQ2 SCORE: 0

## 2024-07-05 ASSESSMENT — FIBROSIS 4 INDEX: FIB4 SCORE: 1.17

## 2024-07-06 ENCOUNTER — HOSPITAL ENCOUNTER (OUTPATIENT)
Dept: RADIOLOGY | Facility: MEDICAL CENTER | Age: 79
End: 2024-07-06
Attending: FAMILY MEDICINE
Payer: MEDICARE

## 2024-07-06 DIAGNOSIS — R22.1 MASS IN NECK: ICD-10-CM

## 2024-07-06 PROCEDURE — 76536 US EXAM OF HEAD AND NECK: CPT

## 2024-07-10 ENCOUNTER — OFFICE VISIT (OUTPATIENT)
Dept: MEDICAL GROUP | Facility: LAB | Age: 79
End: 2024-07-10
Payer: MEDICARE

## 2024-07-10 VITALS
OXYGEN SATURATION: 96 % | WEIGHT: 155 LBS | SYSTOLIC BLOOD PRESSURE: 114 MMHG | TEMPERATURE: 97.7 F | HEART RATE: 75 BPM | DIASTOLIC BLOOD PRESSURE: 58 MMHG | BODY MASS INDEX: 28.35 KG/M2 | RESPIRATION RATE: 18 BRPM

## 2024-07-10 DIAGNOSIS — R70.0 ELEVATED SED RATE: ICD-10-CM

## 2024-07-10 DIAGNOSIS — E78.5 DYSLIPIDEMIA: ICD-10-CM

## 2024-07-10 DIAGNOSIS — E03.9 HYPOTHYROIDISM (ACQUIRED): ICD-10-CM

## 2024-07-10 PROCEDURE — 99214 OFFICE O/P EST MOD 30 MIN: CPT | Performed by: FAMILY MEDICINE

## 2024-07-10 PROCEDURE — 3078F DIAST BP <80 MM HG: CPT | Performed by: FAMILY MEDICINE

## 2024-07-10 PROCEDURE — 3074F SYST BP LT 130 MM HG: CPT | Performed by: FAMILY MEDICINE

## 2024-07-10 RX ORDER — ROSUVASTATIN CALCIUM 5 MG/1
5 TABLET, COATED ORAL EVERY EVENING
Qty: 100 TABLET | Refills: 3 | Status: SHIPPED | OUTPATIENT
Start: 2024-07-10

## 2024-07-10 RX ORDER — ROSUVASTATIN CALCIUM 5 MG/1
5 TABLET, COATED ORAL EVERY EVENING
Qty: 30 TABLET | Refills: 11 | Status: SHIPPED
Start: 2024-07-10 | End: 2024-07-10

## 2024-07-10 ASSESSMENT — FIBROSIS 4 INDEX: FIB4 SCORE: .7647058823529411765

## 2024-07-12 RX ORDER — AMITRIPTYLINE HYDROCHLORIDE 25 MG/1
TABLET, FILM COATED ORAL
Qty: 100 TABLET | Refills: 3 | Status: SHIPPED | OUTPATIENT
Start: 2024-07-12

## 2024-08-07 ENCOUNTER — APPOINTMENT (OUTPATIENT)
Dept: MEDICAL GROUP | Facility: LAB | Age: 79
End: 2024-08-07
Payer: MEDICARE

## 2024-08-09 RX ORDER — ATORVASTATIN CALCIUM 10 MG/1
10 TABLET, FILM COATED ORAL DAILY
Qty: 100 TABLET | Refills: 3 | Status: SHIPPED | OUTPATIENT
Start: 2024-08-09

## 2024-08-09 NOTE — TELEPHONE ENCOUNTER
Received request via: Pharmacy    Was the patient seen in the last year in this department? Yes  7/10/24  Does the patient have an active prescription (recently filled or refills available) for medication(s) requested? No    Pharmacy Name: [facility]    Does the patient have custodial Plus and need 100-day supply? (This applies to ALL medications) Yes, quantity updated to 100 days

## 2024-08-11 ASSESSMENT — PATIENT HEALTH QUESTIONNAIRE - PHQ9
1. LITTLE INTEREST OR PLEASURE IN DOING THINGS: NOT AT ALL
2. FEELING DOWN, DEPRESSED, IRRITABLE, OR HOPELESS: NOT AT ALL

## 2024-08-11 ASSESSMENT — ACTIVITIES OF DAILY LIVING (ADL): BATHING_REQUIRES_ASSISTANCE: 0

## 2024-08-11 ASSESSMENT — ENCOUNTER SYMPTOMS: GENERAL WELL-BEING: GOOD

## 2024-08-13 NOTE — ASSESSMENT & PLAN NOTE
Chronic, stable. She takes amitriptyline 25 mg qhs to help with sleep. Reports this has worked well for her. Follow up with PCP at least annually for continued monitoring and management.

## 2024-08-13 NOTE — ASSESSMENT & PLAN NOTE
Stable. Currently taking levothyroxine 100 mcg daily as prescribed. Denies complications. TSH low but free T4 was WNL.

## 2024-08-13 NOTE — ASSESSMENT & PLAN NOTE
Chronic, stable. She sees ophthalmology but is not receiving intravitreal injections at this time. Follow up as scheduled.

## 2024-08-13 NOTE — ASSESSMENT & PLAN NOTE
Chronic, stable. Last lipid panel in 2022 was WNL. She currently takes atorvastatin 10 mg daily. We discussed her dietary/lifestyle regimen. Follow up with PCP for continued monitoring and management.

## 2024-08-13 NOTE — ASSESSMENT & PLAN NOTE
Chronic, stable. Last DEXA 2023. She does take vitamin D supplementation and calcium. Also gets Reclast infusions annually. Does engage in weightbearing exercise. No hx of fragility fractures. Continue to follow up with PCP as previously scheduled.

## 2024-08-14 ENCOUNTER — APPOINTMENT (OUTPATIENT)
Dept: FAMILY PLANNING/WOMEN'S HEALTH CLINIC | Facility: PHYSICIAN GROUP | Age: 79
End: 2024-08-14
Attending: FAMILY MEDICINE
Payer: MEDICARE

## 2024-08-14 VITALS
WEIGHT: 156 LBS | BODY MASS INDEX: 27.64 KG/M2 | DIASTOLIC BLOOD PRESSURE: 68 MMHG | HEIGHT: 63 IN | SYSTOLIC BLOOD PRESSURE: 124 MMHG

## 2024-08-14 DIAGNOSIS — M85.89 OSTEOPENIA OF MULTIPLE SITES: ICD-10-CM

## 2024-08-14 DIAGNOSIS — E03.9 HYPOTHYROIDISM (ACQUIRED): ICD-10-CM

## 2024-08-14 DIAGNOSIS — E78.5 HYPERLIPIDEMIA, UNSPECIFIED HYPERLIPIDEMIA TYPE: ICD-10-CM

## 2024-08-14 DIAGNOSIS — F51.01 PRIMARY INSOMNIA: ICD-10-CM

## 2024-08-14 DIAGNOSIS — H35.3221 EXUDATIVE AGE-RELATED MACULAR DEGENERATION OF LEFT EYE WITH ACTIVE CHOROIDAL NEOVASCULARIZATION (HCC): ICD-10-CM

## 2024-08-14 PROCEDURE — 3074F SYST BP LT 130 MM HG: CPT

## 2024-08-14 PROCEDURE — 1126F AMNT PAIN NOTED NONE PRSNT: CPT

## 2024-08-14 PROCEDURE — G0439 PPPS, SUBSEQ VISIT: HCPCS

## 2024-08-14 PROCEDURE — 3078F DIAST BP <80 MM HG: CPT

## 2024-08-14 SDOH — ECONOMIC STABILITY: FOOD INSECURITY: WITHIN THE PAST 12 MONTHS, YOU WORRIED THAT YOUR FOOD WOULD RUN OUT BEFORE YOU GOT MONEY TO BUY MORE.: NEVER TRUE

## 2024-08-14 SDOH — ECONOMIC STABILITY: FOOD INSECURITY: WITHIN THE PAST 12 MONTHS, THE FOOD YOU BOUGHT JUST DIDN'T LAST AND YOU DIDN'T HAVE MONEY TO GET MORE.: NEVER TRUE

## 2024-08-14 SDOH — ECONOMIC STABILITY: INCOME INSECURITY: IN THE LAST 12 MONTHS, WAS THERE A TIME WHEN YOU WERE NOT ABLE TO PAY THE MORTGAGE OR RENT ON TIME?: NO

## 2024-08-14 SDOH — ECONOMIC STABILITY: HOUSING INSECURITY: IN THE PAST 12 MONTHS, HOW MANY TIMES HAVE YOU MOVED WHERE YOU WERE LIVING?: 1

## 2024-08-14 SDOH — ECONOMIC STABILITY: HOUSING INSECURITY: AT ANY TIME IN THE PAST 12 MONTHS, WERE YOU HOMELESS OR LIVING IN A SHELTER (INCLUDING NOW)?: NO

## 2024-08-14 SDOH — ECONOMIC STABILITY: TRANSPORTATION INSECURITY
IN THE PAST 12 MONTHS, HAS LACK OF TRANSPORTATION KEPT YOU FROM MEETINGS, WORK, OR FROM GETTING THINGS NEEDED FOR DAILY LIVING?: NO

## 2024-08-14 SDOH — ECONOMIC STABILITY: INCOME INSECURITY: HOW HARD IS IT FOR YOU TO PAY FOR THE VERY BASICS LIKE FOOD, HOUSING, MEDICAL CARE, AND HEATING?: NOT HARD AT ALL

## 2024-08-14 SDOH — ECONOMIC STABILITY: TRANSPORTATION INSECURITY
IN THE PAST 12 MONTHS, HAS THE LACK OF TRANSPORTATION KEPT YOU FROM MEDICAL APPOINTMENTS OR FROM GETTING MEDICATIONS?: NO

## 2024-08-14 ASSESSMENT — FIBROSIS 4 INDEX: FIB4 SCORE: .7647058823529411765

## 2024-08-14 ASSESSMENT — PAIN SCALES - GENERAL: PAINLEVEL: NO PAIN

## 2024-08-14 ASSESSMENT — PATIENT HEALTH QUESTIONNAIRE - PHQ9: CLINICAL INTERPRETATION OF PHQ2 SCORE: 0

## 2024-08-14 NOTE — PROGRESS NOTES
Comprehensive Health Assessment Program     Yoselin Jorge is a 79 y.o. here for her comprehensive health assessment.    Patient Active Problem List    Diagnosis Date Noted    Fracture Risk Assessment Score (FRAX) indicating greater than 3% risk for hip fracture 10/02/2023    Fracture Risk Assessment Score (FRAX) indicating greater than 20% risk for major osteoporosis-related fracture 10/02/2023    Overweight with body mass index (BMI) of 29 to 29.9 in adult 08/07/2023    BMI 29.0-29.9,adult 09/15/2022    Insomnia 11/09/2020    Exudative age-related macular degeneration of left eye with active choroidal neovascularization (HCC) 07/27/2020    Menopause 07/27/2020    Prediabetes 05/11/2017    S/P thyroidectomy 05/22/2015    S/P dilatation of esophageal stricture 05/16/2015    Swallowing difficulty 01/13/2015    Hypothyroidism (acquired) 01/13/2015    Osteopenia of multiple sites 03/30/2013    Hearing loss 03/17/2013    Hyperlipidemia 03/11/2013    History of nasopharyngeal cancer 03/11/2013    Family history of melanoma 03/11/2013       Current Outpatient Medications   Medication Sig Dispense Refill    Multiple Vitamins-Minerals (PRESERVISION AREDS 2 PO) Take  by mouth.      calcium-vitamin D 250-3.125 MG-MCG Tab tablet Take 1 Tablet by mouth every day.      atorvastatin (LIPITOR) 10 MG Tab Take 1 Tablet by mouth every day. 100 Tablet 3    amitriptyline (ELAVIL) 25 MG Tab TAKE ONE TABLET BY MOUTH AT BEDTIME AS NEEDED 100 Tablet 3    levothyroxine (SYNTHROID) 100 MCG Tab Take 1 Tablet by mouth every morning on an empty stomach. 30 Tablet 3    zoledronic Acid (RECLAST) 5 MG/100ML Solution IVPB premix Infuse 100 mL into a venous catheter Once Every 12 Months. 100 mL 6     No current facility-administered medications for this visit.          Current supplements as per medication list.     Allergies:   Patient has no known allergies.  Social History     Tobacco Use    Smoking status: Never    Smokeless tobacco:  Never   Vaping Use    Vaping status: Never Used   Substance Use Topics    Alcohol use: Yes     Alcohol/week: 0.6 oz     Types: 1 Glasses of wine per week     Comment: on rare ocassion    Drug use: No     Family History   Problem Relation Age of Onset    Diabetes Father     Hypertension Father     Hyperlipidemia Father     Stroke Father     Lung Disease Mother 68        Emphazema    Heart Disease Mother         CHF    Cancer Brother 55        liver cancer    Other Brother         Melanoma    Hypertension Brother     Cancer Brother 55        Prostate cancer    Heart Disease Maternal Grandmother 63     Yoselin Ackerman  has a past medical history of Cancer (HCC) (2007), Epigastric abdominal pain of unknown etiology (7/12/2019), Exudative age-related macular degeneration of left eye with active choroidal neovascularization (HCC) (7/27/2020), Family history of melanoma (3/11/2013), Flat foot(734) (3/17/2013), Heart burn, Herniated cervical disc, 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   Past Surgical History:   Procedure Laterality Date    VITRECTOMY POSTERIOR Left 12/17/2019    Procedure: REMOVAL, VITREOUS BODY, POSTERIOR PORTION-MEMBRANE PEEL POSSIBLE LASER GAS OR OIL;  Surgeon: Arlyn Dawson M.D.;  Location: SURGERY SAME DAY [facility];  Service: Ophthalmology    THYROIDECTOMY TOTAL  11/19/2014    Performed by Lukas De Santiago M.D. at SURGERY SAME DAY [facility] ORS    BICEPS TENODESIS  5/22/2014    Performed by Cain Gerardo M.D. at SURGERY [facility]    SHOULDER ARTHROSCOPY W/ ROTATOR CUFF REPAIR  5/22/2014     Performed by Cain Gerardo M.D. at SURGERY [facility] ORS    SHOULDER DECOMPRESSION ARTHROSCOPIC  5/22/2014    Performed by Cain Gerardo M.D. at SURGERY [facility] ORS    CLAVICLE DISTAL EXCISION  5/22/2014    Performed by Cain Gerardo M.D. at SURGERY [facility]    TENDON RELEASE FOOT  August 2013    jocelyn     OTHER ORTHOPEDIC SURGERY  2006    reconstruction  left foot    BUNIONECTOMY  1988    CHOLECYSTECTOMY  1988    TUBAL LIGATION  1982    CATARACT PHACO WITH IOL      Ry    GYN SURGERY      tubal    TONSILLECTOMY      US-NEEDLE CORE BX-BREAST PANEL         Screening:  In the last six months have you experienced any leakage of urine? No    Depression Screening  Little interest or pleasure in doing things?  0 - not at all  Feeling down, depressed , or hopeless? 0 - not at all  Patient Health Questionnaire Score: 0     If depressive symptoms identified deferred to follow up visit unless specifically addressed in assessment and plan.    Interpretation of PHQ-9 Total Score   Score Severity   1-4 No Depression   5-9 Mild Depression   10-14 Moderate Depression   15-19 Moderately Severe Depression   20-27 Severe Depression    Screening for Cognitive Impairment  Do you or any of your friends or family members have any concern about your memory? No  Three Minute Recall (Leader, Season, Table) 3/3    Zach clock face with all 12 numbers and set the hands to show 10 minutes after 11.  Yes    Cognitive concerns identified deferred for follow up unless specifically addressed in assessment and plan.    Fall Risk Assessment  Has the patient had two or more falls in the last year or any fall with injury in the last year?  Yes, she tripped over something. Denies balance issue.    Safety Assessment  Do you always wear your seatbelt?  Yes  Any changes to home needed to function safely? No  Difficulty hearing.  Yes has hearing aids.   Patient counseled about all safety risks that were identified.    Functional  Assessment ADLs  Are there any barriers preventing you from cooking for yourself or meeting nutritional needs?  No.    Are there any barriers preventing you from driving safely or obtaining transportation?  No.    Are there any barriers preventing you from using a telephone or calling for help?  No    Are there any barriers preventing you from shopping?  No.    Are there any barriers preventing you from taking care of your own finances?  No    Are there any barriers preventing you from managing your medications?  No    Are there any barriers preventing you from showering, bathing or dressing yourself? No    Are there any barriers preventing you from doing housework or laundry? No  Are there any barriers preventing you from using the toilet?No  Are you currently engaging in any exercise or physical activity?  Yes.  She still works part time. She is a .     Self-Assessment of Health  What is your perception of your health? Good    Do you sleep more than six hours a night? Yes    In the past 7 days, how much did pain keep you from doing your normal work? None    Do you spend quality time with family or friends (virtually or in person)? Yes    Do you usually eat a heart healthy diet that constists of a variety of fruits, vegetables, whole grains and fiber? Yes    Do you eat foods high in fat and/or Fast Food more than three times per week? No    How concerned are you that your medical conditions are not being well managed? Not at all    Are you worried that in the next 2 months, you may not have stable housing that you own, rent, or stay in as part of a household? No      Advance Care Planning  Do you have an Advance Directive, Living Will, Durable Power of , or POLST? Yes    Living Will Durable Power of    is not on file - instructed patient to bring in a copy to scan into their chart      Health Maintenance Summary            Influenza Vaccine (1) Next due on 9/1/2024       09/29/2023  Outside Immunization: Influenza Quad Adjuvanted    09/26/2022  Imm Admin: Influenza, Unspecified - HISTORICAL DATA    09/17/2021  Imm Admin: Influenza, Unspecified - HISTORICAL DATA    09/14/2021  Imm Admin: Influenza Vaccine Quad Inj (Pf)    10/13/2020  Imm Admin: Influenza Vaccine Adult HD    Only the first 5 history entries have been loaded, but more history exists.              Annual Wellness Visit (Yearly) Next due on 8/14/2025 08/14/2024  Level of Service: OR ANNUAL WELLNESS VISIT-INCLUDES PPPS SUBSEQUE*    08/07/2023  Level of Service: OR ANNUAL WELLNESS VISIT-INCLUDES PPPS SUBSEQUE*    09/15/2022  Level of Service: OR ANNUAL WELLNESS VISIT-INCLUDES PPPS SUBSEQUE*    11/01/2021  Subsequent Annual Wellness Visit - Includes PPPS ()    07/27/2020  Subsequent Annual Wellness Visit - Includes PPPS ()    Only the first 5 history entries have been loaded, but more history exists.              Bone Density Scan (Every 2 Years) Next due on 9/22/2025 09/22/2023  DS-BONE DENSITY STUDY (DEXA)    11/09/2022  DS-BONE DENSITY STUDY (DEXA)    08/27/2020  DS-BONE DENSITY STUDY (DEXA)    11/14/2017  DS-BONE DENSITY STUDY (DEXA)    04/28/2015  DS-BONE DENSITY STUDY (DEXA)    Only the first 5 history entries have been loaded, but more history exists.              IMM DTaP/Tdap/Td Vaccine (3 - Td or Tdap) Next due on 11/9/2030 11/09/2020  Imm Admin: Tdap Vaccine    10/27/2009  Imm Admin: Tdap Vaccine              Hepatitis C Screening  Tentatively Complete      05/04/2015  Hepatitis C Antibody component of HEP C VIRUS ANTIBODY              Pneumococcal Vaccine: 65+ Years (Series Information) Completed      10/24/2017  Imm Admin: Pneumococcal polysaccharide vaccine (PPSV-23)    10/16/2014  Imm Admin: Pneumococcal Conjugate Vaccine (Prevnar/PCV-13)              Zoster (Shingles) Vaccines (Series Information) Completed      07/27/2020  Imm Admin: Zoster Vaccine Recombinant (RZV) (SHINGRIX)     02/19/2020  Imm Admin: Zoster Vaccine Recombinant (RZV) (SHINGRIX)    10/24/2011  Imm Admin: Zoster Vaccine Live (ZVL) (Zostavax) - HISTORICAL DATA              COVID-19 Vaccine (Series Information) Completed      04/13/2024  Imm Admin: Comirnaty (Covid-19 Vaccine, Mrna, 8871-1508 Formula)    09/29/2023  Imm Admin: Comirnaty (Covid-19 Vaccine, Mrna, [id number] Formula)    09/14/2022  Imm Admin: PFIZER BIVALENT SARS-COV-2 VACCINE (12+)    04/15/2022  Imm Admin: PFIZER SANTOS CAP SARS-COV-2 VACCINATION (12+)    10/04/2021  Imm Admin: PFIZER PURPLE CAP SARS-COV-2 VACCINATION (12+)    Only the first 5 history entries have been loaded, but more history exists.              Hepatitis A Vaccine (Hep A) (Series Information) Aged Out      No completion history exists for this topic.              Hepatitis B Vaccine (Hep B) (Series Information) Aged Out      No completion history exists for this topic.              HPV Vaccines (Series Information) Aged Out      No completion history exists for this topic.              Polio Vaccine (Inactivated Polio) (Series Information) Aged Out      No completion history exists for this topic.              Meningococcal Immunization (Series Information) Aged Out      No completion history exists for this topic.              Discontinued - Mammogram  Discontinued        Frequency changed to Never automatically (Topic No Longer Applies)    01/18/2023  MA-SCREENING MAMMO BILAT W/TOMOSYNTHESIS W/CAD    10/01/2021  MA-SCREENING MAMMO BILAT W/TOMOSYNTHESIS W/CAD    08/27/2020  MA-SCREENING MAMMO BILAT W/TOMOSYNTHESIS W/CAD    05/20/2019  MA-SCREENING MAMMO BILAT W/TOMOSYNTHESIS W/CAD    Only the first 5 history entries have been loaded, but more history exists.              Discontinued - Colorectal Cancer Screening  Discontinued        Frequency changed to Never automatically (Topic No Longer Applies)    01/05/2016  AMB REFERRAL TO GI FOR COLONOSCOPY                    Patient Care Team:  Brian  "NEVIN Cutler M.D. as PCP - General (Family Medicine)  Slick Pcikett O.D. as Consulting Physician (Optometry)  Raimundo Love DDS as Consulting Physician (Dentistry)  Jorge L Weinstein as Consulting Physician (Orthopedic Surgery)  Mitchel Manuel M.D. as Consulting Physician (Otolaryngology)  Arlyn Dawson M.D. as Consulting Physician (Ophthalmology)  Osiel Pizarro M.D. (Gastroenterology)  Ana Mobley C.N.A. as [facility]       Financial Resource Strain: Low Risk  (8/14/2024)    Overall Financial Resource Strain (CARDIA)     Difficulty of Paying Living Expenses: Not hard at all      Transportation Needs: No Transportation Needs (8/14/2024)    PRAPARE - Transportation     Lack of Transportation (Medical): No     Lack of Transportation (Non-Medical): No      Food Insecurity: No Food Insecurity (8/14/2024)    Hunger Vital Sign     Worried About Running Out of Food in the Last Year: Never true     Ran Out of Food in the Last Year: Never true        Encounter Vitals  Blood Pressure : 124/68  Weight: 70.8 kg (156 lb)  Height: 160 cm (5' 3\")  BMI (Calculated): 27.63  Pain Score: No pain     Alert, oriented in no acute distress.  Eye contact is good, speech goal directed, affect calm.    Assessment and Plan. The following treatment and monitoring plan is recommended:  Exudative age-related macular degeneration of left eye with active choroidal neovascularization (HCC)  Chronic, stable. She sees ophthalmology but is not receiving intravitreal injections at this time. Follow up as scheduled.     Hyperlipidemia  Chronic, stable. Last lipid panel in 2022 was WNL. She currently takes atorvastatin 10 mg daily. We discussed her dietary/lifestyle regimen. Follow up with PCP for continued monitoring and management.      Hypothyroidism (acquired)  Stable. Currently taking levothyroxine 100 mcg daily as prescribed. Denies complications. TSH low but free T4 was WNL.      Insomnia  Chronic, stable. " She takes amitriptyline 25 mg qhs to help with sleep. Reports this has worked well for her. Follow up with PCP at least annually for continued monitoring and management.      Osteopenia of multiple sites  Chronic, stable. Last DEXA 2023. She does take vitamin D supplementation and calcium. Also gets Reclast infusions annually. Does engage in weightbearing exercise. No hx of fragility fractures. Continue to follow up with PCP as previously scheduled.      Services suggested: No services needed at this time  Health Care Screening: Age-appropriate preventive services recommended by USPTF and ACIP covered by Medicare were discussed today. Services ordered if indicated and agreed upon by the patient.  Referrals offered: Community-based lifestyle interventions to reduce health risks and promote self-management and wellness, fall prevention, nutrition, physical activity, tobacco-use cessation, weight loss, and mental health services as per orders if indicated.    Discussion today about general wellness and lifestyle habits:    Prevent falls and reduce trip hazards; Cautioned about securing or removing rugs.  Have a working fire alarm and carbon monoxide detector.  Engage in regular physical activity and social activities.    Follow-up: Return for appointment with Primary Care Provider as needed..

## 2024-08-21 DIAGNOSIS — E03.9 HYPOTHYROIDISM (ACQUIRED): ICD-10-CM

## 2024-08-21 RX ORDER — LEVOTHYROXINE SODIUM 100 UG/1
100 TABLET ORAL
Qty: 100 TABLET | Refills: 3 | Status: SHIPPED
Start: 2024-08-21 | End: 2024-08-22

## 2024-08-21 NOTE — TELEPHONE ENCOUNTER
Received request via: Pharmacy    Was the patient seen in the last year in this department? Yes    Does the patient have an active prescription (recently filled or refills available) for medication(s) requested? No    Pharmacy Name: [facility]     Does the patient have FPC Plus and need 100-day supply? (This applies to ALL medications) Yes, quantity updated to 100 days

## 2024-11-24 ENCOUNTER — APPOINTMENT (OUTPATIENT)
Dept: RADIOLOGY | Facility: MEDICAL CENTER | Age: 79
End: 2024-11-24
Attending: EMERGENCY MEDICINE
Payer: MEDICARE

## 2024-11-24 ENCOUNTER — HOSPITAL ENCOUNTER (INPATIENT)
Facility: MEDICAL CENTER | Age: 79
LOS: 2 days | End: 2024-11-26
Attending: EMERGENCY MEDICINE | Admitting: STUDENT IN AN ORGANIZED HEALTH CARE EDUCATION/TRAINING PROGRAM
Payer: MEDICARE

## 2024-11-24 DIAGNOSIS — R56.9 SEIZURE (HCC): ICD-10-CM

## 2024-11-24 DIAGNOSIS — R55 SYNCOPE, UNSPECIFIED SYNCOPE TYPE: ICD-10-CM

## 2024-11-24 PROBLEM — R29.6 UNWITNESSED FALL: Status: ACTIVE | Noted: 2024-11-24

## 2024-11-24 LAB
ALBUMIN SERPL BCP-MCNC: 4.5 G/DL (ref 3.2–4.9)
ALBUMIN/GLOB SERPL: 1.3 G/DL
ALP SERPL-CCNC: 107 U/L (ref 30–99)
ALT SERPL-CCNC: 16 U/L (ref 2–50)
ANION GAP SERPL CALC-SCNC: 15 MMOL/L (ref 7–16)
AST SERPL-CCNC: 29 U/L (ref 12–45)
BASOPHILS # BLD AUTO: 0.4 % (ref 0–1.8)
BASOPHILS # BLD: 0.04 K/UL (ref 0–0.12)
BILIRUB SERPL-MCNC: 0.5 MG/DL (ref 0.1–1.5)
BUN SERPL-MCNC: 8 MG/DL (ref 8–22)
CALCIUM ALBUM COR SERPL-MCNC: 9.4 MG/DL (ref 8.5–10.5)
CALCIUM SERPL-MCNC: 9.8 MG/DL (ref 8.4–10.2)
CHLORIDE SERPL-SCNC: 103 MMOL/L (ref 96–112)
CK SERPL-CCNC: 520 U/L (ref 0–154)
CO2 SERPL-SCNC: 23 MMOL/L (ref 20–33)
CREAT SERPL-MCNC: 0.73 MG/DL (ref 0.5–1.4)
EKG IMPRESSION: NORMAL
EOSINOPHIL # BLD AUTO: 0.03 K/UL (ref 0–0.51)
EOSINOPHIL NFR BLD: 0.3 % (ref 0–6.9)
ERYTHROCYTE [DISTWIDTH] IN BLOOD BY AUTOMATED COUNT: 49.1 FL (ref 35.9–50)
ETHANOL BLD-MCNC: <10.1 MG/DL
GFR SERPLBLD CREATININE-BSD FMLA CKD-EPI: 84 ML/MIN/1.73 M 2
GLOBULIN SER CALC-MCNC: 3.6 G/DL (ref 1.9–3.5)
GLUCOSE BLD STRIP.AUTO-MCNC: 77 MG/DL (ref 65–99)
GLUCOSE BLD STRIP.AUTO-MCNC: 92 MG/DL (ref 65–99)
GLUCOSE SERPL-MCNC: 105 MG/DL (ref 65–99)
HCT VFR BLD AUTO: 46.3 % (ref 37–47)
HGB BLD-MCNC: 15.4 G/DL (ref 12–16)
IMM GRANULOCYTES # BLD AUTO: 0.03 K/UL (ref 0–0.11)
IMM GRANULOCYTES NFR BLD AUTO: 0.3 % (ref 0–0.9)
LYMPHOCYTES # BLD AUTO: 1.11 K/UL (ref 1–4.8)
LYMPHOCYTES NFR BLD: 10.5 % (ref 22–41)
MCH RBC QN AUTO: 29 PG (ref 27–33)
MCHC RBC AUTO-ENTMCNC: 33.3 G/DL (ref 32.2–35.5)
MCV RBC AUTO: 87.2 FL (ref 81.4–97.8)
MONOCYTES # BLD AUTO: 0.41 K/UL (ref 0–0.85)
MONOCYTES NFR BLD AUTO: 3.9 % (ref 0–13.4)
NEUTROPHILS # BLD AUTO: 8.94 K/UL (ref 1.82–7.42)
NEUTROPHILS NFR BLD: 84.6 % (ref 44–72)
NRBC # BLD AUTO: 0 K/UL
NRBC BLD-RTO: 0 /100 WBC (ref 0–0.2)
PLATELET # BLD AUTO: 400 K/UL (ref 164–446)
PMV BLD AUTO: 10.1 FL (ref 9–12.9)
POTASSIUM SERPL-SCNC: 4.1 MMOL/L (ref 3.6–5.5)
PROT SERPL-MCNC: 8.1 G/DL (ref 6–8.2)
RBC # BLD AUTO: 5.31 M/UL (ref 4.2–5.4)
SODIUM SERPL-SCNC: 141 MMOL/L (ref 135–145)
T4 FREE SERPL-MCNC: 1.48 NG/DL (ref 0.93–1.7)
TSH SERPL DL<=0.005 MIU/L-ACNC: 4.55 UIU/ML (ref 0.38–5.33)
WBC # BLD AUTO: 10.6 K/UL (ref 4.8–10.8)

## 2024-11-24 PROCEDURE — 72170 X-RAY EXAM OF PELVIS: CPT

## 2024-11-24 PROCEDURE — 82962 GLUCOSE BLOOD TEST: CPT | Mod: 91

## 2024-11-24 PROCEDURE — 73552 X-RAY EXAM OF FEMUR 2/>: CPT | Mod: LT

## 2024-11-24 PROCEDURE — 700111 HCHG RX REV CODE 636 W/ 250 OVERRIDE (IP): Performed by: EMERGENCY MEDICINE

## 2024-11-24 PROCEDURE — 73030 X-RAY EXAM OF SHOULDER: CPT | Mod: LT

## 2024-11-24 PROCEDURE — 80053 COMPREHEN METABOLIC PANEL: CPT

## 2024-11-24 PROCEDURE — 93005 ELECTROCARDIOGRAM TRACING: CPT | Performed by: EMERGENCY MEDICINE

## 2024-11-24 PROCEDURE — 36415 COLL VENOUS BLD VENIPUNCTURE: CPT

## 2024-11-24 PROCEDURE — 85025 COMPLETE CBC W/AUTO DIFF WBC: CPT

## 2024-11-24 PROCEDURE — 84443 ASSAY THYROID STIM HORMONE: CPT

## 2024-11-24 PROCEDURE — 72125 CT NECK SPINE W/O DYE: CPT

## 2024-11-24 PROCEDURE — 96374 THER/PROPH/DIAG INJ IV PUSH: CPT

## 2024-11-24 PROCEDURE — 82077 ASSAY SPEC XCP UR&BREATH IA: CPT

## 2024-11-24 PROCEDURE — 99285 EMERGENCY DEPT VISIT HI MDM: CPT

## 2024-11-24 PROCEDURE — 99223 1ST HOSP IP/OBS HIGH 75: CPT | Performed by: STUDENT IN AN ORGANIZED HEALTH CARE EDUCATION/TRAINING PROGRAM

## 2024-11-24 PROCEDURE — 71045 X-RAY EXAM CHEST 1 VIEW: CPT

## 2024-11-24 PROCEDURE — 84439 ASSAY OF FREE THYROXINE: CPT

## 2024-11-24 PROCEDURE — 70450 CT HEAD/BRAIN W/O DYE: CPT

## 2024-11-24 PROCEDURE — 94760 N-INVAS EAR/PLS OXIMETRY 1: CPT

## 2024-11-24 PROCEDURE — 82550 ASSAY OF CK (CPK): CPT

## 2024-11-24 PROCEDURE — 93005 ELECTROCARDIOGRAM TRACING: CPT

## 2024-11-24 PROCEDURE — 770020 HCHG ROOM/CARE - TELE (206)

## 2024-11-24 RX ORDER — LORAZEPAM 2 MG/ML
1 INJECTION INTRAMUSCULAR
Status: DISCONTINUED | OUTPATIENT
Start: 2024-11-24 | End: 2024-11-26 | Stop reason: HOSPADM

## 2024-11-24 RX ORDER — ACETAMINOPHEN 325 MG/1
650 TABLET ORAL EVERY 6 HOURS PRN
Status: DISCONTINUED | OUTPATIENT
Start: 2024-11-24 | End: 2024-11-26 | Stop reason: HOSPADM

## 2024-11-24 RX ORDER — ENOXAPARIN SODIUM 100 MG/ML
40 INJECTION SUBCUTANEOUS DAILY
Status: DISCONTINUED | OUTPATIENT
Start: 2024-11-25 | End: 2024-11-26 | Stop reason: HOSPADM

## 2024-11-24 RX ORDER — KETOROLAC TROMETHAMINE 15 MG/ML
15 INJECTION, SOLUTION INTRAMUSCULAR; INTRAVENOUS EVERY 6 HOURS PRN
Status: DISCONTINUED | OUTPATIENT
Start: 2024-11-24 | End: 2024-11-26 | Stop reason: HOSPADM

## 2024-11-24 RX ORDER — LABETALOL HYDROCHLORIDE 5 MG/ML
10 INJECTION, SOLUTION INTRAVENOUS EVERY 4 HOURS PRN
Status: DISCONTINUED | OUTPATIENT
Start: 2024-11-24 | End: 2024-11-26 | Stop reason: HOSPADM

## 2024-11-24 RX ORDER — LORAZEPAM 2 MG/ML
1 INJECTION INTRAMUSCULAR ONCE
Status: COMPLETED | OUTPATIENT
Start: 2024-11-24 | End: 2024-11-24

## 2024-11-24 RX ORDER — LEVOTHYROXINE SODIUM 100 UG/1
100 TABLET ORAL
Status: DISCONTINUED | OUTPATIENT
Start: 2024-11-25 | End: 2024-11-26 | Stop reason: HOSPADM

## 2024-11-24 RX ORDER — ATORVASTATIN CALCIUM 10 MG/1
10 TABLET, FILM COATED ORAL DAILY
Status: DISCONTINUED | OUTPATIENT
Start: 2024-11-25 | End: 2024-11-25

## 2024-11-24 RX ADMIN — LORAZEPAM 1 MG: 2 INJECTION INTRAMUSCULAR; INTRAVENOUS at 20:27

## 2024-11-24 SDOH — ECONOMIC STABILITY: TRANSPORTATION INSECURITY
IN THE PAST 12 MONTHS, HAS LACK OF RELIABLE TRANSPORTATION KEPT YOU FROM MEDICAL APPOINTMENTS, MEETINGS, WORK OR FROM GETTING THINGS NEEDED FOR DAILY LIVING?: NO

## 2024-11-24 ASSESSMENT — COGNITIVE AND FUNCTIONAL STATUS - GENERAL
SUGGESTED CMS G CODE MODIFIER MOBILITY: CK
WALKING IN HOSPITAL ROOM: A LITTLE
DRESSING REGULAR LOWER BODY CLOTHING: A LITTLE
CLIMB 3 TO 5 STEPS WITH RAILING: A LITTLE
SUGGESTED CMS G CODE MODIFIER DAILY ACTIVITY: CI
TURNING FROM BACK TO SIDE WHILE IN FLAT BAD: A LITTLE
MOVING FROM LYING ON BACK TO SITTING ON SIDE OF FLAT BED: A LITTLE
DAILY ACTIVITIY SCORE: 23
MOVING TO AND FROM BED TO CHAIR: A LITTLE
STANDING UP FROM CHAIR USING ARMS: A LITTLE
MOBILITY SCORE: 18

## 2024-11-24 ASSESSMENT — FIBROSIS 4 INDEX
FIB4 SCORE: 1.41
FIB4 SCORE: .7647058823529411765
FIB4 SCORE: 1.41

## 2024-11-24 ASSESSMENT — LIFESTYLE VARIABLES
TOTAL SCORE: 0
TOTAL SCORE: 0
ON A TYPICAL DAY WHEN YOU DRINK ALCOHOL HOW MANY DRINKS DO YOU HAVE: 0
HAVE YOU EVER FELT YOU SHOULD CUT DOWN ON YOUR DRINKING: NO
AVERAGE NUMBER OF DAYS PER WEEK YOU HAVE A DRINK CONTAINING ALCOHOL: 0
HOW MANY TIMES IN THE PAST YEAR HAVE YOU HAD 5 OR MORE DRINKS IN A DAY: 0
ALCOHOL_USE: NO
EVER HAD A DRINK FIRST THING IN THE MORNING TO STEADY YOUR NERVES TO GET RID OF A HANGOVER: NO
EVER FELT BAD OR GUILTY ABOUT YOUR DRINKING: NO
DOES PATIENT WANT TO STOP DRINKING: NO
CONSUMPTION TOTAL: NEGATIVE
HAVE PEOPLE ANNOYED YOU BY CRITICIZING YOUR DRINKING: NO
TOTAL SCORE: 0

## 2024-11-24 ASSESSMENT — SOCIAL DETERMINANTS OF HEALTH (SDOH)

## 2024-11-24 ASSESSMENT — ENCOUNTER SYMPTOMS
ABDOMINAL PAIN: 0
SHORTNESS OF BREATH: 0
FEVER: 0
DIARRHEA: 0
NAUSEA: 0
COUGH: 0
CHILLS: 0
VOMITING: 0
SEIZURES: 1
LOSS OF CONSCIOUSNESS: 1

## 2024-11-24 ASSESSMENT — PATIENT HEALTH QUESTIONNAIRE - PHQ9
2. FEELING DOWN, DEPRESSED, IRRITABLE, OR HOPELESS: NOT AT ALL
1. LITTLE INTEREST OR PLEASURE IN DOING THINGS: NOT AT ALL
SUM OF ALL RESPONSES TO PHQ9 QUESTIONS 1 AND 2: 0

## 2024-11-24 ASSESSMENT — PAIN DESCRIPTION - PAIN TYPE
TYPE: ACUTE PAIN
TYPE: ACUTE PAIN

## 2024-11-25 ENCOUNTER — APPOINTMENT (OUTPATIENT)
Dept: RADIOLOGY | Facility: MEDICAL CENTER | Age: 79
End: 2024-11-25
Attending: HOSPITALIST
Payer: MEDICARE

## 2024-11-25 ENCOUNTER — APPOINTMENT (OUTPATIENT)
Dept: RADIOLOGY | Facility: MEDICAL CENTER | Age: 79
End: 2024-11-25
Attending: STUDENT IN AN ORGANIZED HEALTH CARE EDUCATION/TRAINING PROGRAM
Payer: MEDICARE

## 2024-11-25 PROBLEM — I95.9 HYPOTENSION: Status: ACTIVE | Noted: 2024-11-25

## 2024-11-25 LAB
ANION GAP SERPL CALC-SCNC: 13 MMOL/L (ref 7–16)
APPEARANCE UR: CLEAR
BACTERIA #/AREA URNS HPF: NORMAL /HPF
BILIRUB UR QL STRIP.AUTO: NEGATIVE
BUN SERPL-MCNC: 9 MG/DL (ref 8–22)
CALCIUM SERPL-MCNC: 8.7 MG/DL (ref 8.4–10.2)
CHLORIDE SERPL-SCNC: 106 MMOL/L (ref 96–112)
CO2 SERPL-SCNC: 21 MMOL/L (ref 20–33)
COLOR UR: YELLOW
CREAT SERPL-MCNC: 0.67 MG/DL (ref 0.5–1.4)
EPI CELLS #/AREA URNS HPF: NORMAL /HPF
ERYTHROCYTE [DISTWIDTH] IN BLOOD BY AUTOMATED COUNT: 48.5 FL (ref 35.9–50)
GFR SERPLBLD CREATININE-BSD FMLA CKD-EPI: 89 ML/MIN/1.73 M 2
GLUCOSE SERPL-MCNC: 114 MG/DL (ref 65–99)
GLUCOSE UR STRIP.AUTO-MCNC: NEGATIVE MG/DL
HCT VFR BLD AUTO: 39 % (ref 37–47)
HGB BLD-MCNC: 12.8 G/DL (ref 12–16)
KETONES UR STRIP.AUTO-MCNC: ABNORMAL MG/DL
LEUKOCYTE ESTERASE UR QL STRIP.AUTO: ABNORMAL
MCH RBC QN AUTO: 28.8 PG (ref 27–33)
MCHC RBC AUTO-ENTMCNC: 32.8 G/DL (ref 32.2–35.5)
MCV RBC AUTO: 87.8 FL (ref 81.4–97.8)
MICRO URNS: ABNORMAL
NITRITE UR QL STRIP.AUTO: NEGATIVE
PH UR STRIP.AUTO: 6 [PH] (ref 5–8)
PLATELET # BLD AUTO: 341 K/UL (ref 164–446)
PMV BLD AUTO: 9.9 FL (ref 9–12.9)
POTASSIUM SERPL-SCNC: 3.9 MMOL/L (ref 3.6–5.5)
PROT UR QL STRIP: NEGATIVE MG/DL
RBC # BLD AUTO: 4.44 M/UL (ref 4.2–5.4)
RBC # URNS HPF: NORMAL /HPF
RBC UR QL AUTO: NEGATIVE
SODIUM SERPL-SCNC: 140 MMOL/L (ref 135–145)
SP GR UR STRIP.AUTO: 1.02
WBC # BLD AUTO: 10.8 K/UL (ref 4.8–10.8)
WBC #/AREA URNS HPF: NORMAL /HPF

## 2024-11-25 PROCEDURE — A9270 NON-COVERED ITEM OR SERVICE: HCPCS | Performed by: HOSPITALIST

## 2024-11-25 PROCEDURE — 94760 N-INVAS EAR/PLS OXIMETRY 1: CPT

## 2024-11-25 PROCEDURE — 95816 EEG AWAKE AND DROWSY: CPT | Performed by: STUDENT IN AN ORGANIZED HEALTH CARE EDUCATION/TRAINING PROGRAM

## 2024-11-25 PROCEDURE — 92610 EVALUATE SWALLOWING FUNCTION: CPT

## 2024-11-25 PROCEDURE — 80048 BASIC METABOLIC PNL TOTAL CA: CPT

## 2024-11-25 PROCEDURE — A9579 GAD-BASE MR CONTRAST NOS,1ML: HCPCS | Mod: JZ | Performed by: STUDENT IN AN ORGANIZED HEALTH CARE EDUCATION/TRAINING PROGRAM

## 2024-11-25 PROCEDURE — 700105 HCHG RX REV CODE 258: Performed by: STUDENT IN AN ORGANIZED HEALTH CARE EDUCATION/TRAINING PROGRAM

## 2024-11-25 PROCEDURE — 770020 HCHG ROOM/CARE - TELE (206)

## 2024-11-25 PROCEDURE — 99232 SBSQ HOSP IP/OBS MODERATE 35: CPT | Performed by: HOSPITALIST

## 2024-11-25 PROCEDURE — 81001 URINALYSIS AUTO W/SCOPE: CPT

## 2024-11-25 PROCEDURE — 700102 HCHG RX REV CODE 250 W/ 637 OVERRIDE(OP): Performed by: HOSPITALIST

## 2024-11-25 PROCEDURE — 95816 EEG AWAKE AND DROWSY: CPT | Mod: 26 | Performed by: STUDENT IN AN ORGANIZED HEALTH CARE EDUCATION/TRAINING PROGRAM

## 2024-11-25 PROCEDURE — 700111 HCHG RX REV CODE 636 W/ 250 OVERRIDE (IP): Mod: JZ | Performed by: STUDENT IN AN ORGANIZED HEALTH CARE EDUCATION/TRAINING PROGRAM

## 2024-11-25 PROCEDURE — 36415 COLL VENOUS BLD VENIPUNCTURE: CPT

## 2024-11-25 PROCEDURE — 700102 HCHG RX REV CODE 250 W/ 637 OVERRIDE(OP): Performed by: STUDENT IN AN ORGANIZED HEALTH CARE EDUCATION/TRAINING PROGRAM

## 2024-11-25 PROCEDURE — 4A10X4Z MONITORING OF CENTRAL NERVOUS ELECTRICAL ACTIVITY, EXTERNAL APPROACH: ICD-10-PCS | Performed by: STUDENT IN AN ORGANIZED HEALTH CARE EDUCATION/TRAINING PROGRAM

## 2024-11-25 PROCEDURE — A9270 NON-COVERED ITEM OR SERVICE: HCPCS | Performed by: STUDENT IN AN ORGANIZED HEALTH CARE EDUCATION/TRAINING PROGRAM

## 2024-11-25 PROCEDURE — 700117 HCHG RX CONTRAST REV CODE 255: Mod: JZ | Performed by: STUDENT IN AN ORGANIZED HEALTH CARE EDUCATION/TRAINING PROGRAM

## 2024-11-25 PROCEDURE — 85027 COMPLETE CBC AUTOMATED: CPT

## 2024-11-25 PROCEDURE — 51798 US URINE CAPACITY MEASURE: CPT

## 2024-11-25 PROCEDURE — 70553 MRI BRAIN STEM W/O & W/DYE: CPT

## 2024-11-25 RX ORDER — ATORVASTATIN CALCIUM 10 MG/1
5 TABLET, FILM COATED ORAL DAILY
Status: DISCONTINUED | OUTPATIENT
Start: 2024-11-26 | End: 2024-11-26 | Stop reason: HOSPADM

## 2024-11-25 RX ORDER — ROSUVASTATIN CALCIUM 5 MG/1
5 TABLET, COATED ORAL EVERY EVENING
COMMUNITY
End: 2024-12-02

## 2024-11-25 RX ORDER — LEVETIRACETAM 100 MG/ML
500 SOLUTION ORAL EVERY 12 HOURS
Status: DISCONTINUED | OUTPATIENT
Start: 2024-11-25 | End: 2024-11-25

## 2024-11-25 RX ORDER — SODIUM CHLORIDE 9 MG/ML
1000 INJECTION, SOLUTION INTRAVENOUS ONCE
Status: COMPLETED | OUTPATIENT
Start: 2024-11-25 | End: 2024-11-25

## 2024-11-25 RX ORDER — SODIUM CHLORIDE 9 MG/ML
INJECTION, SOLUTION INTRAVENOUS CONTINUOUS
Status: DISCONTINUED | OUTPATIENT
Start: 2024-11-25 | End: 2024-11-26 | Stop reason: HOSPADM

## 2024-11-25 RX ORDER — LEVETIRACETAM 500 MG/1
500 TABLET ORAL EVERY 12 HOURS
Status: DISCONTINUED | OUTPATIENT
Start: 2024-11-25 | End: 2024-11-25

## 2024-11-25 RX ORDER — LEVETIRACETAM 100 MG/ML
500 SOLUTION ORAL EVERY 12 HOURS
Status: DISCONTINUED | OUTPATIENT
Start: 2024-11-25 | End: 2024-11-26 | Stop reason: HOSPADM

## 2024-11-25 RX ORDER — IBUPROFEN 200 MG
200 TABLET ORAL EVERY 6 HOURS PRN
COMMUNITY
End: 2024-12-02

## 2024-11-25 RX ADMIN — SODIUM CHLORIDE: 9 INJECTION, SOLUTION INTRAVENOUS at 17:32

## 2024-11-25 RX ADMIN — GADOTERIDOL 15 ML: 279.3 INJECTION, SOLUTION INTRAVENOUS at 12:36

## 2024-11-25 RX ADMIN — LEVOTHYROXINE SODIUM 100 MCG: 0.1 TABLET ORAL at 05:02

## 2024-11-25 RX ADMIN — ATORVASTATIN CALCIUM 10 MG: 10 TABLET, FILM COATED ORAL at 05:02

## 2024-11-25 RX ADMIN — SODIUM CHLORIDE: 9 INJECTION, SOLUTION INTRAVENOUS at 06:20

## 2024-11-25 RX ADMIN — SODIUM CHLORIDE 1000 ML: 9 INJECTION, SOLUTION INTRAVENOUS at 04:59

## 2024-11-25 RX ADMIN — KETOROLAC TROMETHAMINE 15 MG: 15 INJECTION, SOLUTION INTRAMUSCULAR; INTRAVENOUS at 00:12

## 2024-11-25 RX ADMIN — ENOXAPARIN SODIUM 40 MG: 100 INJECTION SUBCUTANEOUS at 17:33

## 2024-11-25 RX ADMIN — LEVETIRACETAM 500 MG: 500 SOLUTION ORAL at 17:32

## 2024-11-25 ASSESSMENT — ENCOUNTER SYMPTOMS
CHILLS: 0
SPUTUM PRODUCTION: 0
HEMOPTYSIS: 0
DIZZINESS: 0
ORTHOPNEA: 0
VOMITING: 0
PALPITATIONS: 0
COUGH: 0
NAUSEA: 0

## 2024-11-25 ASSESSMENT — PAIN DESCRIPTION - PAIN TYPE
TYPE: ACUTE PAIN

## 2024-11-25 NOTE — PROGRESS NOTES
Patient states that she had stage 4 nasopharyngeal cancer in 2006 - she underwent chemotherapy and radiation treatment and was PEG tube feed dependent for a year. She is cancer free, but gets her esophagus dilated about every 2 years because of her cancer due to her difficulty swallowing and speaking. This is not noted in her PMHx note.    I have consulted speech therapy to evaluate patient.

## 2024-11-25 NOTE — PROGRESS NOTES
MD Lavon aware of patient's blood pressure of 81/39 and drop in hgb from 15.4 to 12.8 this am. No signs of bleeding at this time. Patient states she is asymptomatic. Vitals are otherwise stable. 1 Liter bolus and Mivf ordered.    Patient also bladder scanned at this time for 98 - MD aware that patient was not able to provide urine sample through out the shift and that she does not have much in her bladder which may be attributing to her low BP    Will recheck BP when bolus is done.

## 2024-11-25 NOTE — PROCEDURES
INPATIENT ROUTINE VIDEO ELECTROENCEPHALOGRAM REPORT    REFERRING PROVIDER: Miko Jimenez M.d.  DOS: 11/25/2024  STUDY DURATION: 0 hours and 25 minutes of total recording time.     INDICATION:  Yoselin Jorge 79 y.o. female presenting with unspecified convulsions    RELEVANT MEDICATIONS/TREATMENTS:   Scheduled Medications   Medication Dose Frequency    levETIRAcetam  500 mg Q12HRS    enoxaparin (LOVENOX) injection  40 mg DAILY AT 1800    atorvastatin  10 mg DAILY    levothyroxine  100 mcg AM ES       TECHNIQUE:   Routine VEEG was set up by a Neurodiagnostic technologist who performed education to the patient and staff. A minimum of 23 electrodes and 23 channel recording was setup and performed by Neurodiagnostic technologist, in accordance with the international 10-20 system. The study was reviewed in bipolar and referential montages. The recording examined the patient in the  awake state(s).     DESCRIPTION OF THE RECORD:  During wakefulness, the background was continuous and showed a 10.5 Hz posterior dominant rhythm.  There was reactivity to eye closure/opening.  An anterior-posterior gradient was noted with faster beta frequencies seen anteriorly.  During drowsiness, theta/delta frequencies were seen.    EEG Sleep: N2 sleep architecture was not seen.    ICTAL AND INTERICTAL FINDINGS:   No focal or generalized epileptiform activity noted.     No regional slowing or persistent focal asymmetries were seen.    No seizures.     ACTIVATION PROCEDURES:   Intermittent Photic stimulation was performed in a stepwise fashion from 1 to 30 Hz and elicited a normal response (photic driving), most noticeable in the posterior leads.    EKG: Sampling of the EKG recording showed sinus rhythm    EVENTS:  None    INTERPRETATION:   Normal video EEG recording in the awake state(s):  - No regional slowing or persistent focal asymmetries were seen.  - No epileptiform discharges or other epileptiform phenomena seen.  - No seizures.  Clinical correlation is recommended.      Note: A normal EEG does not rule out the possibility of seizures or exclude a diagnosis of epilepsy.  If the clinical suspicion remains high for seizures, a prolonged recording to capture clinical or subclinical events may be helpful.    Debra León MD  Department of Neurology at [facility]  General Neurologist and Epileptologist   of Clinical Neurology, [facility].

## 2024-11-25 NOTE — ED NOTES
Rounded on pt. Pt 93% on RA. Pt is A+O 2, not oriented to time or situation at this time. POC explained and  at bedside. ERP aware. Pt given warm blankets. Call light in reach

## 2024-11-25 NOTE — PROGRESS NOTES
Telemetry Shift Summary     Rhythm: SR  HR: 71-79  Ectopy: rPAC    Measurements: .16/.08/.40    Per monitor tech

## 2024-11-25 NOTE — PROGRESS NOTES
4 Eyes Skin Assessment Completed by ELISSA Paiz and ELISSA Schwarz.    Head WDL  Ears WDL  Nose WDL  Mouth Redness and Discoloration  From possible bite from fall  Neck WDL  Breast/Chest WDL  Shoulder Blades WDL  Spine WDL  (R) Arm/Elbow/Hand WDL  (L) Arm/Elbow/Hand WDL  Abdomen WDL  Groin WDL  Scrotum/Coccyx/Buttocks WDL  (R) Leg WDL  (L) Leg WDL  (R) Heel/Foot/Toe WDL  (L) Heel/Foot/Toe WDL          Devices In Places Tele Box, Blood Pressure Cuff, and Pulse Ox      Interventions In Place Gray Ear Foams    Possible Skin Injury No    Pictures Uploaded Into Epic N/A  Wound Consult Placed N/A  RN Wound Prevention Protocol Ordered No

## 2024-11-25 NOTE — CARE PLAN
The patient is Stable - Low risk of patient condition declining or worsening    Shift Goals  Clinical Goals: monitor for seizures  Patient Goals: pain control  Family Goals: update poc    Progress made toward(s) clinical / shift goals:    Problem: Knowledge Deficit - Standard  Goal: Patient and family/care givers will demonstrate understanding of plan of care, disease process/condition, diagnostic tests and medications  Outcome: Progressing       Patient is not progressing towards the following goals:

## 2024-11-25 NOTE — ED TRIAGE NOTES
"Chief Complaint   Patient presents with    Syncope     Presents with spouse who reports he \"found her passed out in the bathroom\". Pt. Reports she fell on L side and has L shoulder, L hip, L lower mouth pain, and L elbow pain. Denies blood thinner use. PT. States she is unsure what happened. States she feels \"foggy\" and besides this and the aforementioned pain, she states she feels at baseline. Denies CP, SOB, dizziness, h/a.     Physical Exam  Pulmonary:      Effort: Pulmonary effort is normal.   Skin:     General: Skin is warm and dry.   Neurological:      Mental Status: She is alert.       BP (!) 196/86   Pulse 70   Temp 36.4 °C (97.5 °F) (Temporal)   Resp 18   Ht 1.6 m (5' 3\")   Wt 72.8 kg (160 lb 7.9 oz)   LMP 08/01/1995   SpO2 95%   BMI 28.43 kg/m²     "

## 2024-11-25 NOTE — THERAPY
Speech Language Pathology   Clinical Swallow Evaluation     Patient Name: Yoselin Jorge  AGE:  79 y.o., SEX:  female  Medical Record #: [id number]  Date of Service: 11/25/2024      History of Present Illness  Pt is a 78 y/o female admitted 11/24 with syncope and being found down by . Had witnessed seizure in the ED; suspected possible seizure at home too.     PMHx: hypothyroidism, hearing loss, DLD, osteoporosis, insomnia. 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 She now gets esophagus dilated ~2 years d/t difficulty swallowing and speaking s/p cancer tx.   No previous SLP notes found in EMR.     Brain MRI: pending  EEG results: pending     CXR:   1.  Bilateral basilar atelectasis, no focal infiltrate.     Esophagram 8/23/21 with findings of:   1. Mild focal narrowing at GE junction. The 13 mm barium pill retained in the GE junction for 2 minutes but able to pass eventually.  2. Small hiatal hernia. Mild spontaneous gastroesophageal reflux to the midesophagus.    General Information:  Vitals  O2 Delivery Device: None - Room Air  Level of Consciousness: Alert, Awake  Patient Behaviors:  (Pleasant and cooperative)  Orientation: Oriented x 4  Follows Directives: Yes    Prior Living Situation & Level of Function:  Prior Services: None  Lives with - Patient's Self Care Capacity: Spouse  Communication: WFL  Swallowing: Impaired; hx of dysphagia d/t previous nasopharyngeal CA in 2006 and s/p chemo and radiation. Reports she is very particular about knowing which foods she can tolerate and which she can't; independently strict use of swallow precautions d/t same     Oral Mechanism Evaluation:  Dentition: Good, Natural dentition   Facial Symmetry: Equal  Facial Sensation: Equal     Labial Observations: WFL   Lingual Observations: Midline  Motor Speech: WFL       Laryngeal Function:  Secretion Management: Adequate  Voice  "Quality: WFL, Other (see comments) (Hypernasal)  Max Phonation Time (seconds): 8  Cough: Perceptually WNL    Subjective  RN cleared patient for CSE. Patient awake, alert, pleasant, cooperative, and with supportive  present at bedside. Patient just finished EEG and RN reported Brain MRI is scheduled soon. Patient denied any difficulty with current diet of regular textures and reports no hx of pneumonia.    Assessment  Current Method of Nutrition: Oral diet (Regular/thins)  Positioning: Ballard's (60-90 degrees)  Bolus Administration: Patient  O2 Delivery Device: None - Room Air  Factor(s) Affecting Performance: None  Tracheostomy : No     Swallowing Trials:  Swallowing Trials  Ice: WFL  Thin Liquid (TN0): WFL  Liquidised (LQ3): WFL  Soft & Bite Sized (SB6): WFL  Easy to Chew (EC7): WFL  Regular (RG7): WFL    Comments: Patient reported hx of dysphagia w/ PEG tube for one year in 2006 after chemo and radiation for nasopharyngeal cancer. Patient reports since then, she has not had pneumonia and rarely has s/sx of aspiration, but does endorse vocal quality changes (hypernasal to this listener), intermittent nasal regurgitation w/ liquids, inability to secure straw sips and puff cheeks up with air, and some difficulty w/ pills whole w/ thin liquid wash. 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 Patient also reports esophageal dysphagia w/ recent dilation about two weeks ago, though reports \"they only work for about a week.\"     Patient reports preferring softer, more moist foods, but is still able to consume dry, tough, higher level solids \"as long as I'm careful.\" Patient presented with hypernasal vocal quality and reduced intraoral pressure, but all other aspects of OME were WFL. Patient consumed PO trials of ice, thins via cup sip and straw, liquidized purees, soft solids, mixed consistencies, and dry solids. Patient self-delivered all PO trials with " appropriate bolus acceptance and sufficient containment. Mastication was mildly prolonged (appeared d/t an overabundance of caution), but functional with complete oral clearance achieved. No s/sx of aspiration noted on any textures trialed.     Clinical Impressions  Patient presents with suspected baseline oropharyngeal and esophageal swallow function. Patient does report hx of oropharyngeal and esophageal dysphagia, but appears very knowledgeable about what foods/textures she can tolerate and is cautious regarding same. Patient appears appropriate to resume regular textures w/ thin liquids with use of swallow precautions. Patient and  educated on s/sx of aspiration, dysphagia, risks for pneumonia, SLP role, and SLP recs; all questions answered. Patient does not appear to require any further acute or post-acute SLP services for same. Please re-consult w/ any difficulty.     Recommendations  Diet Consistency: Regular/thins  Instrumentation: None indicated at this time  Medication: Whole with puree, Whole with liquid, As tolerated, Cut large pills, One pill at a time  Supervision: Independent  Positioning: Fully upright and midline during oral intake, Meals sitting upright in a chair, as tolerated  Risk Management : Small bites/sips, Alternate bites and sips, Slow rate of intake, Reduce environmental distractions, Physical mobility, as tolerated  Oral Care: Q4h    SLP Treatment Plan  Treatment Plan: None Indicated  SLP Frequency: N/A - Evaluation Only  Estimated Duration: N/A - Evaluation Only    Anticipated Discharge Needs  Discharge Recommendations: Anticipate that the patient will have no further speech therapy needs after discharge from the hospital   Therapy Recommendations Upon DC: Not Indicated      Angelita Adam, SLP

## 2024-11-25 NOTE — PROGRESS NOTES
[facility] Medicine Daily Progress Note    Date of Service  11/25/2024    Chief Complaint  Yoselin Jorge is a 79 y.o. female admitted 11/24/2024 with loss of consciousness    Hospital Course  Yoselin Jorge has past medical history of hypothyroidism, hearing loss, dyslipidemia, and osteoporosis.  She also has a remote history of head neck cancer.  She does not drink alcohol or take benzodiazepines.  She has never had a seizure in the past.  The patient was found by her  passed out on the ground on 11/24/2024. She came to the emergency room.  While in the emergency room as she had a witnessed seizure.  Patient was hospitalized for further care and workup    Interval Problem Update  Patient seen and examined  Blood pressure has been labile, she was initially quite hypertensive.  Since admission she has had blood pressures as low as the 80s  Responded to IV fluids  She does not currently feel dizzy or lightheaded  Reports intermittent mild headaches, none right now  She does have some mild pain in left shoulder, and left hip    I have discussed this patient's plan of care and discharge plan at IDT rounds today with Case Management, Nursing, Nursing leadership, and other members of the IDT team.    Consultants/Specialty  None    Code Status  Full Code    Disposition  The patient is not medically cleared for discharge to home or a post-acute facility.  Anticipate discharge to: home with close outpatient follow-up    I have placed the appropriate orders for post-discharge needs.    Review of Systems  Review of Systems   Constitutional:  Negative for chills and malaise/fatigue.   HENT:  Positive for hearing loss.    Respiratory:  Negative for cough, hemoptysis and sputum production.    Cardiovascular:  Negative for chest pain, palpitations and orthopnea.   Gastrointestinal:  Negative for nausea and vomiting.   Skin:  Negative for itching and rash.   Neurological:  Negative for dizziness.   All other systems  reviewed and are negative.       Physical Exam  Temp:  [36.4 °C (97.5 °F)-37 °C (98.6 °F)] 36.5 °C (97.7 °F)  Pulse:  [58-86] 58  Resp:  [15-23] 18  BP: ()/(39-86) 120/54  SpO2:  [94 %-98 %] 94 %    Physical Exam  Constitutional:       General: She is not in acute distress.     Appearance: Normal appearance. She is normal weight.   HENT:      Head: Normocephalic and atraumatic.      Nose: Nose normal.      Mouth/Throat:      Comments: Injury to lower lip, consistent with biting her lip during seizure  Eyes:      Extraocular Movements: Extraocular movements intact.   Cardiovascular:      Rate and Rhythm: Normal rate and regular rhythm.      Pulses: Normal pulses.   Pulmonary:      Effort: Pulmonary effort is normal.      Breath sounds: Normal breath sounds.   Abdominal:      General: Abdomen is flat. Bowel sounds are normal.      Palpations: Abdomen is soft.   Musculoskeletal:         General: Normal range of motion.      Cervical back: Normal range of motion and neck supple.   Skin:     General: Skin is warm and dry.   Neurological:      General: No focal deficit present.      Mental Status: She is alert and oriented to person, place, and time.      Cranial Nerves: No cranial nerve deficit.   Psychiatric:         Mood and Affect: Mood normal.         Behavior: Behavior normal.         Fluids    Intake/Output Summary (Last 24 hours) at 11/25/2024 1341  Last data filed at 11/25/2024 0900  Gross per 24 hour   Intake 200 ml   Output --   Net 200 ml        Laboratory  Recent Labs     11/24/24 1953 11/25/24  0303   WBC 10.6 10.8   RBC 5.31 4.44   HEMOGLOBIN 15.4 12.8   HEMATOCRIT 46.3 39.0   MCV 87.2 87.8   MCH 29.0 28.8   MCHC 33.3 32.8   RDW 49.1 48.5   PLATELETCT 400 341   MPV 10.1 9.9     Recent Labs     11/24/24 1953 11/25/24  0303   SODIUM 141 140   POTASSIUM 4.1 3.9   CHLORIDE 103 106   CO2 23 21   GLUCOSE 105* 114*   BUN 8 9   CREATININE 0.73 0.67   CALCIUM 9.8 8.7                   Imaging  MR-BRAIN-WITH  & W/O   Final Result         1.  Hyperostosis frontalis interna.      2.  Age-related cerebral atrophy.      3.  Mild periventricular white matter changes, most prominently centrally in the right cerebellar hemisphere, consistent with chronic microvascular ischemic gliosis.      CT-CSPINE WITHOUT PLUS RECONS   Final Result         1.  No acute traumatic bony injury of the cervical spine is apparent.   2.  Scattered patchy bilateral groundglass opacities suggesting edema or atypical infiltrates      CT-HEAD W/O   Final Result         1.  No acute intracranial abnormality.               DX-FEMUR-2+ LEFT   Final Result         1.  No radiographic evidence of acute traumatic injury.      DX-PELVIS-1 OR 2 VIEWS   Final Result         1.  No acute traumatic bony injury.      DX-SHOULDER 2+ LEFT   Final Result         1.  No acute traumatic bony injury.         DX-CHEST-PORTABLE (1 VIEW)   Final Result         1.  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, I therefore ordered Keppra    Hypotension- (present on admission)  Assessment & Plan  11/25:  Patient developed hypotension after admission  Has responded to IV fluids  Does not appear to be septic  Continue to monitor    Unwitnessed fall- (present on admission)  Assessment & Plan  11/25:  X-ray of the shoulder, hip, and pelvis are unremarkable with no fractures  CPK slightly elevated at 520    Hypothyroidism (acquired)- (present on admission)  Assessment & Plan  Synthroid    Mixed hyperlipidemia- (present on admission)  Assessment & Plan  Continue statin          VTE prophylaxis:    enoxaparin ppx      I have performed a physical exam and reviewed and updated ROS and Plan today (11/25/2024). In review of yesterday's note (11/24/2024), there are no changes except as documented above.

## 2024-11-25 NOTE — ASSESSMENT & PLAN NOTE
11/25:  It seems likely that the patient has had a seizure at home and then the ER  She does not have history of seizure disorder, she does not drink alcohol or take benzodiazepines, she does have a history of malignancy  EEG and MRI pending  Appears to have new onset seizure disorder/epilepsy with 2 consecutive seizures, I therefore ordered Keppra

## 2024-11-25 NOTE — ASSESSMENT & PLAN NOTE
11/25:  X-ray of the shoulder, hip, and pelvis are unremarkable with no fractures  CPK slightly elevated at 520

## 2024-11-25 NOTE — CARE PLAN
The patient is Stable - Low risk of patient condition declining or worsening    Shift Goals  Clinical Goals: EEG, MRI, SZR precautions.  Patient Goals: Go home  Family Goals: update poc    Progress made toward(s) clinical / shift goals:    Problem: Knowledge Deficit - Standard  Goal: Patient and family/care givers will demonstrate understanding of plan of care, disease process/condition, diagnostic tests and medications  Outcome: Progressing  Note: POC discussed. All questions answered to patient satisfaction.        Problem: Fall Risk  Goal: Patient will remain free from falls  Outcome: Progressing  Note: Bed alarm and strip alarm in place. Pt calls appropriately.      Problem: Pain - Standard  Goal: Alleviation of pain or a reduction in pain to the patient’s comfort goal  Outcome: Progressing  Note: Pt declines pharmacological pain intervention.       Patient is not progressing towards the following goals:

## 2024-11-25 NOTE — ED NOTES
Medication history reviewed with patient at bedside. Med rec is partially completed via a medication list provided by spouse at bedside. Unable to call [facility] pharmacy as they are closed at this time      Medication list included: amitriptyline, levothyroxine and rosuvastatin. Per electronic prescription records last filled atorvastatin on 11/8/2024.    Allergies reviewed    Patient denied taking any anticoagulation in the last 30 days.    Ok per patient to discuss medications with visitor present             Yolande Newalla, Pharm.D., [facility]  ER Clinical Pharmacist

## 2024-11-25 NOTE — PROGRESS NOTES
Telemetry Shift Summary    Rhythm SB/SR  HR Range 55-61  Ectopy none reported  Measurements 0.14/0.08/0.46        Normal Values  Rhythm SR  HR Range    Measurements 0.12-0.20 / 0.06-0.10  / 0.30-0.52

## 2024-11-25 NOTE — ED PROVIDER NOTES
"ED Provider Note    Scribed for Justus Alvarado M.D. by Wang Ceron. 11/24/2024  8:28 PM    Primary care provider: Brian Cutler M.D.    CHIEF COMPLAINT  Chief Complaint   Patient presents with    Syncope     Presents with spouse who reports he \"found her passed out in the bathroom\". Pt. Reports she fell on L side and has L shoulder, L hip, L lower mouth pain, and L elbow pain. Denies blood thinner use. PT. States she is unsure what happened. States she feels \"foggy\" and besides this and the aforementioned pain, she states she feels at baseline. Denies CP, SOB, dizziness, h/a.     EXTERNAL RECORDS REVIEWED  Visit from August 14 reviewed.  Has hyperlipidemia, hypothyroidism, insomnia, osteopenia    HPI    Yoselin Jorge is a 79 y.o. female who presents to the Emergency Department for evaluation of syncope onset prior to arrival. Per , the patient was found on the ground unconscious, which prompted him to bring her the ED. found her in the bedroom and does not know how long she was unconscious.  The patient is amnestic to the event.  The patient was aware of her  immediately awakening and the  noticed no seizure activity.. Patient is not on blood thinners. He is unsure how long the patient has been unconscious on the ground.  He reports associated hip and back pain after her fall. He denies having any cough, nausea, emesis, or flu-like symptoms.He notes no history of seizure, diabetes, syncope or heart failure.  is unsure if the patient struck her head. 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      OUTSIDE HISTORIAN(S):   is present and provided history.     PAST MEDICAL HISTORY  Past Medical History:   Diagnosis Date    Cancer (HCC) 2007    nasopharangeal     Epigastric abdominal pain of unknown etiology 7/12/2019    Exudative age-related macular degeneration of left eye with active choroidal " neovascularization (HCC) 7/27/2020    Family history of melanoma 3/11/2013    Flat foot(734) 3/17/2013    Heart burn     Herniated cervical disc     L4- L5 right side-Dr. Weinstein    History of nasopharyngeal cancer 3/11/2013    Hyperlipidemia 3/11/2013    Impaired fasting glucose 3/17/2013    Indigestion     Menopause 7/27/2020    Other lymphedema 3/11/2013    Prediabetes 5/11/2017    Rib pain on right side 7/31/2018    S/P dilatation of esophageal stricture 5/16/2015    EGD 2015.    S/P thyroidectomy 5/22/2015    SENSORINEURAL HEARING LOSS 3/17/2013    Shoulder pain 2014    Unspecified cataract     bilater. IOL    Unspecified urinary incontinence        FAMILY HISTORY  Family History   Problem Relation Age of Onset    Diabetes Father     Hypertension Father     Hyperlipidemia Father     Stroke Father     Lung Disease Mother 68        Emphazema    Heart Disease Mother         CHF    Cancer Brother 55        liver cancer    Other Brother         Melanoma    Hypertension Brother     Cancer Brother 55        Prostate cancer    Heart Disease Maternal Grandmother 63       SOCIAL HISTORY  Social History     Tobacco Use    Smoking status: Never    Smokeless tobacco: Never   Vaping Use    Vaping status: Never Used   Substance Use Topics    Alcohol use: Yes     Alcohol/week: 0.6 oz     Types: 1 Glasses of wine per week     Comment: on rare ocassion    Drug use: No     Social History     Substance and Sexual Activity   Drug Use No       SURGICAL HISTORY  Past Surgical History:   Procedure Laterality Date    VITRECTOMY POSTERIOR Left 12/17/2019    Procedure: REMOVAL, VITREOUS BODY, POSTERIOR PORTION-MEMBRANE PEEL POSSIBLE LASER GAS OR OIL;  Surgeon: Arlyn Dawson M.D.;  Location: SURGERY SAME DAY [facility];  Service: Ophthalmology    THYROIDECTOMY TOTAL  11/19/2014    Performed by Lukas De Santiago M.D. at SURGERY SAME DAY [facility]    BICEPS TENODESIS  5/22/2014    Performed by Cain Gerardo M.D. at [facility]    SHOULDER ARTHROSCOPY W/ ROTATOR CUFF REPAIR  5/22/2014    Performed by Cain Gerardo M.D. at SURGERY [facility]    SHOULDER DECOMPRESSION ARTHROSCOPIC  5/22/2014    Performed by Cain Gerardo M.D. at SURGERY [facility]    CLAVICLE DISTAL EXCISION  5/22/2014    Performed by Cain Gerardo M.D. at SURGERY [facility]    [facility]     OTHER ORTHOPEDIC SURGERY  2006    reconstruction  left foot    BUNIONECTOMY  1988    CHOLECYSTECTOMY  1988    TUBAL LIGATION  1982    CATARACT PHACO WITH IOL      Ry    GYN SURGERY      tubal    TONSILLECTOMY      US-NEEDLE CORE BX-BREAST PANEL         CURRENT MEDICATIONS  Current Outpatient Medications:     levothyroxine (SYNTHROID) 100 MCG Tab, Take 1 Tablet by mouth every morning on an empty stomach., Disp: 100 Tablet, Rfl: 3    Multiple Vitamins-Minerals (PRESERVISION AREDS 2 PO), Take  by mouth., Disp: , Rfl:     calcium-vitamin D 250-3.125 MG-MCG Tab tablet, Take 1 Tablet by mouth every day., Disp: , Rfl:     atorvastatin (LIPITOR) 10 MG Tab, Take 1 Tablet by mouth every day., Disp: 100 Tablet, Rfl: 3    amitriptyline (ELAVIL) 25 MG Tab, TAKE ONE TABLET BY MOUTH AT BEDTIME AS NEEDED, Disp: 100 Tablet, Rfl: 3    zoledronic Acid (RECLAST) 5 MG/100ML Solution IVPB premix, Infuse 100 mL into a venous catheter Once Every 12 Months., Disp: 100 mL, Rfl: 6    ALLERGIES  No Known Allergies    PHYSICAL EXAM  VITAL SIGNS: BP (!) 196/86   Pulse 70   Temp 36.4 °C (97.5 °F) (Temporal)   Resp 18   Ht 1.6 m (5' 3\")   Wt 72.8 kg (160 lb 7.9 oz)   LMP 08/01/1995   SpO2 95%   BMI 28.43 kg/m²   Reviewed and per tensive  Constitutional: Well developed, Well nourished.  Actively seizing when I first evaluated the patient  HENT: Two red marks on right cheek, Normocephalic, bilateral external ears normal, No intraoral erythema, edema, exudate.  Lower lip abrasion.  No tongue laceration  Eyes: Pupils 3 mm equal and reactive to " light, conjunctiva pink, no scleral icterus.   Cardiovascular: Regular rate and rhythm. No murmurs, rubs or gallops.  No dependent edema or calf tenderness  Respiratory: Lungs clear to auscultation bilaterally. No wheezes, rales, or rhonchi.  Abdominal:  Abdomen soft, non-tender, non distended. No rebound, or guarding.    Skin: No erythema, no rash. No wounds or bruising.  Genitourinary: No costovertebral angle tenderness.   Musculoskeletal: Moves all extremities, no deformities.   Neurologic: Once postictal phase resolved she is oriented to person place and time but not events.  Alert & oriented x 3, cranial nerves 2-12 intact by passive exam.  Moves all extremities with symmetric strength.  She responded appropriately to pain in all 4 limbs when she was postictal..  Psychiatric: Affect normal, Judgment normal, Mood normal.     LABS Ordered and Reviewed by Me:  Results for orders placed or performed during the hospital encounter of 11/24/24   POCT glucose device results    Collection Time: 11/24/24  7:35 PM   Result Value Ref Range    POC Glucose, Blood 77 65 - 99 mg/dL   CBC WITH DIFFERENTIAL    Collection Time: 11/24/24  7:53 PM   Result Value Ref Range    WBC 10.6 4.8 - 10.8 K/uL    RBC 5.31 4.20 - 5.40 M/uL    Hemoglobin 15.4 12.0 - 16.0 g/dL    Hematocrit 46.3 37.0 - 47.0 %    MCV 87.2 81.4 - 97.8 fL    MCH 29.0 27.0 - 33.0 pg    MCHC 33.3 32.2 - 35.5 g/dL    RDW 49.1 35.9 - 50.0 fL    Platelet Count 400 164 - 446 K/uL    MPV 10.1 9.0 - 12.9 fL    Neutrophils-Polys 84.60 (H) 44.00 - 72.00 %    Lymphocytes 10.50 (L) 22.00 - 41.00 %    Monocytes 3.90 0.00 - 13.40 %    Eosinophils 0.30 0.00 - 6.90 %    Basophils 0.40 0.00 - 1.80 %    Immature Granulocytes 0.30 0.00 - 0.90 %    Nucleated RBC 0.00 0.00 - 0.20 /100 WBC    Neutrophils (Absolute) 8.94 (H) 1.82 - 7.42 K/uL    Lymphs (Absolute) 1.11 1.00 - 4.80 K/uL    Monos (Absolute) 0.41 0.00 - 0.85 K/uL    Eos (Absolute) 0.03 0.00 - 0.51 K/uL    Baso (Absolute) 0.04  0.00 - 0.12 K/uL    Immature Granulocytes (abs) 0.03 0.00 - 0.11 K/uL    NRBC (Absolute) 0.00 K/uL   Comp Metabolic Panel    Collection Time: 11/24/24  7:53 PM   Result Value Ref Range    Sodium 141 135 - 145 mmol/L    Potassium 4.1 3.6 - 5.5 mmol/L    Chloride 103 96 - 112 mmol/L    Co2 23 20 - 33 mmol/L    Anion Gap 15.0 7.0 - 16.0    Glucose 105 (H) 65 - 99 mg/dL    Bun 8 8 - 22 mg/dL    Creatinine 0.73 0.50 - 1.40 mg/dL    Calcium 9.8 8.4 - 10.2 mg/dL    Correct Calcium 9.4 8.5 - 10.5 mg/dL    AST(SGOT) 29 12 - 45 U/L    ALT(SGPT) 16 2 - 50 U/L    Alkaline Phosphatase 107 (H) 30 - 99 U/L    Total Bilirubin 0.5 0.1 - 1.5 mg/dL    Albumin 4.5 3.2 - 4.9 g/dL    Total Protein 8.1 6.0 - 8.2 g/dL    Globulin 3.6 (H) 1.9 - 3.5 g/dL    A-G Ratio 1.3 g/dL   ETHYL ALCOHOL (BLOOD)    Collection Time: 11/24/24  7:53 PM   Result Value Ref Range    Diagnostic Alcohol <10.1 <10.1 mg/dL   ESTIMATED GFR    Collection Time: 11/24/24  7:53 PM   Result Value Ref Range    GFR (CKD-EPI) 84 >60 mL/min/1.73 m 2   TSH    Collection Time: 11/24/24  7:53 PM   Result Value Ref Range    TSH 4.550 0.380 - 5.330 uIU/mL   FREE THYROXINE    Collection Time: 11/24/24  7:53 PM   Result Value Ref Range    Free T-4 1.48 0.93 - 1.70 ng/dL   POCT glucose device results    Collection Time: 11/24/24  8:36 PM   Result Value Ref Range    POC Glucose, Blood 92 65 - 99 mg/dL       Interpretations:    Pulse Oximetry: 95% on room air which I interpret is normal    EKG Interpretation by me    Indication: Loss of consciousness    Rhythm: normal sinus   Rate: Normal at 68  Axis: normal  Ectopy: none  Conduction: normal  ST/T Waves: no acute change  Q Waves: none  R Wave progression: normal  Hypertrophy changes: Absent    Comparison: None    Clinical Impression: Normal sinus rhythm    RADIOLOGY  I have independently interpreted the head CT associated with this visit demonstrating no intracranial hemorrhage or mass.  I am awaiting the final reading from the  radiologist.     Final Radiology Report  CT-CSPINE WITHOUT PLUS RECONS   Final Result         1.  No acute traumatic bony injury of the cervical spine is apparent.   2.  Scattered patchy bilateral groundglass opacities suggesting edema or atypical infiltrates      CT-HEAD W/O   Final Result         1.  No acute intracranial abnormality.               DX-FEMUR-2+ LEFT   Final Result         1.  No radiographic evidence of acute traumatic injury.      DX-PELVIS-1 OR 2 VIEWS   Final Result         1.  No acute traumatic bony injury.      DX-SHOULDER 2+ LEFT   Final Result         1.  No acute traumatic bony injury.         DX-CHEST-PORTABLE (1 VIEW)   Final Result         1.  Bilateral basilar atelectasis, no focal infiltrate      MR-BRAIN-WITH & W/O    (Results Pending)     Radiologist interpretation have been reviewed by me.     COURSE & MEDICAL DECISION MAKING  8:24 PM - Patient seen and examined acutely at bedside while experiencing a seizure. I explained plan of care, including labs and imaging. He agrees to plan of care.    INTERVENTIONS BY ME:  Medications   LORazepam (Ativan) injection 1 mg (1 mg Intravenous Given 11/24/24 2027)       - On reassessment response to intervention patient had stopped seizing before I administered lorazepam.  During the seizure she was placed on oxygen and I kept her airway open.  Her first O2 sats on the sat monitor 198%.  During the seizure patient had right upward eye gaze deviation.  Her seizure was generalized and lasted approximately 2 minutes    ASSESSMENT, COURSE AND PLAN:  PROBLEMS EVALUATED THIS VISIT:    This patient was brought in by her  for loss of consciousness thought to be syncope.  The patient was amnestic and likely struck her head.  According to the  she had no prodromal symptoms of headache chest pain or palpitations.  She had no vomiting diarrhea or GI bleed.  She had no fever or urinary symptoms.  After she had a first witnessed seizure here in  the ER it made it less likely her event today was syncope.  She may have had new onset recurrent seizures today.  Fortunately on head CT there is no intracranial hemorrhage from trauma, no subarachnoid hemorrhage, no mass and no evidence of old stroke.  She does appear to have periventricular white matter change.      DISPOSITION AND DISCUSSIONS    I have discussed management of the patient with the following physicians and sources:    Hospitalist Dr. Doll who will admit the patient for neurology consultation as needed, MRI and possibly EEG, antiepileptics    RISK:  High given need for escalation of care to include admission    PLAN:  As above    CONDITION: Guarded.     FINAL IMPRESSION  1. Seizure (HCC)    2. Syncope, unspecified syncope type          I, Wang Ceron (Bijanibsu), am scribing for, and in the presence of, Justus Alvarado M.D..    Electronically signed by: Wang Ceron (Kevyn), 11/24/2024    IJustus M.D. personally performed the services described in this documentation, as scribed by Wang Ceron in my presence, and it is both accurate and complete.    The note accurately reflects work and decisions made by me.  Justus Alvarado M.D.  11/24/2024  10:24 PM

## 2024-11-25 NOTE — HOSPITAL COURSE
Yoselin Jorge has past medical history of hypothyroidism, hearing loss, dyslipidemia, and osteoporosis.  She also has a remote history of head neck cancer.  She does not drink alcohol or take benzodiazepines.  She has never had a seizure in the past.  The patient was found by her  passed out on the ground on 11/24/2024. She came to the emergency room.  While in the emergency room as she had a witnessed seizure.  Patient was hospitalized for further care and workup

## 2024-11-25 NOTE — H&P
"Hospital Medicine History & Physical Note    Date of Service  11/24/2024    Primary Care Physician  Brian Cutler M.D.  Code Status  Full Code    Chief Complaint  Chief Complaint   Patient presents with    Syncope     Presents with spouse who reports he \"found her passed out in the bathroom\". Pt. Reports she fell on L side and has L shoulder, L hip, L lower mouth pain, and L elbow pain. Denies blood thinner use. PT. States she is unsure what happened. States she feels \"foggy\" and besides this and the aforementioned pain, she states she feels at baseline. Denies CP, SOB, dizziness, h/a.       History of Presenting Illness  Yoselin Jorge is a 79 y.o. female who presented 11/24/2024 after being found down by .  PMH of hypothyroidism, hearing loss, dyslipidemia, osteoporosis, insomnia.  Her  found her passed out on the ground but she woke up as soon as he called her name, unknown downtime as he was sleeping in another room.  She was confused afterwards, no previous episodes of syncope in the past.  She does not remember how she fell, prior to the fall she was in her normal state of health.  Denies chest pain, palpitations, dizziness, shortness of breath.    While in the ED she had a witnessed seizure.  Denies any history of seizures, no family history of seizures, no recent head trauma, no recent change in medications, denies any alcohol use or drug.  Initial labs and imaging in the ED unremarkable.    In the ED afebrile, blood pressure significant elevated to systolic of 196.    I discussed the plan of care with patient, bedside RN, and EDP .    Review of Systems  Review of Systems   Constitutional:  Negative for chills and fever.   Respiratory:  Negative for cough and shortness of breath.    Cardiovascular:  Negative for chest pain.   Gastrointestinal:  Negative for abdominal pain, diarrhea, nausea and vomiting.   Genitourinary:  Negative for dysuria and urgency.   Neurological:  Positive " for seizures and loss of consciousness.       Past Medical History   has a past medical history of Cancer (HCC) (2007), Epigastric abdominal pain of unknown etiology (7/12/2019), Exudative age-related macular degeneration of left eye with active choroidal neovascularization (HCC) (7/27/2020), Family history of melanoma (3/11/2013), Flat foot(734) (3/17/2013), Heart burn, Herniated cervical disc, 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    Surgical History   has a past surgical history that includes tonsillectomy; tendon release foot (August 2013); cataract phaco with iol; bunionectomy (1988); tubal ligation (1982); biceps tenodesis (5/22/2014); thyroidectomy total (11/19/2014); us-needle core bx-breast panel; shoulder arthroscopy w/ rotator cuff repair (5/22/2014); shoulder decompression arthroscopic (5/22/2014); clavicle distal excision (5/22/2014); other orthopedic surgery (2006); gyn surgery; cholecystectomy (1988); and vitrectomy posterior (Left, 12/17/2019).     Family History  family history includes Cancer (age of onset: 55) in her brother and brother; Diabetes in her father; Heart Disease in her mother; Heart Disease (age of onset: 63) in her maternal grandmother; Hyperlipidemia in her father; Hypertension in her brother and father; Lung Disease (age of onset: 68) in her mother; Other in her brother; Stroke in her father.   Family history reviewed with patient. There is no family history that is pertinent to the chief complaint.     Social History   reports that she has never smoked. She has never used smokeless  tobacco. She reports current alcohol use of about 0.6 oz of alcohol per week. She reports that she does not use drugs.    Allergies  No Known Allergies    Medications  Prior to Admission Medications   Prescriptions Last Dose Informant Patient Reported? Taking?   Multiple Vitamins-Minerals (PRESERVISION AREDS 2 PO)   Yes No   Sig: Take  by mouth.   amitriptyline (ELAVIL) 25 MG Tab Unknown  No No   Sig: TAKE ONE TABLET BY MOUTH AT BEDTIME AS NEEDED   atorvastatin (LIPITOR) 10 MG Tab   No No   Sig: Take 1 Tablet by mouth every day.   calcium-vitamin D 250-3.125 MG-MCG Tab tablet   Yes No   Sig: Take 1 Tablet by mouth every day.   levothyroxine (SYNTHROID) 100 MCG Tab Unknown  No No   Sig: Take 1 Tablet by mouth every morning on an empty stomach.   zoledronic Acid (RECLAST) 5 MG/100ML Solution IVPB premix   No No   Sig: Infuse 100 mL into a venous catheter Once Every 12 Months.      Facility-Administered Medications: None       Physical Exam  Temp:  [36.4 °C (97.5 °F)] 36.4 °C (97.5 °F)  Pulse:  [70] 70  Resp:  [18] 18  BP: (196)/(86) 196/86  SpO2:  [95 %] 95 %  Blood Pressure : (!) 196/86   Temperature: 36.4 °C (97.5 °F)   Pulse: 70   Respiration: 18   Pulse Oximetry: 95 %       Physical Exam  Constitutional:       Appearance: Normal appearance.   HENT:      Head: Normocephalic and atraumatic.      Mouth/Throat:      Mouth: Mucous membranes are moist.      Pharynx: No oropharyngeal exudate or posterior oropharyngeal erythema.   Eyes:      Comments: Exophthalmos   Cardiovascular:      Rate and Rhythm: Normal rate and regular rhythm.      Pulses: Normal pulses.      Heart sounds: Normal heart sounds. No murmur heard.  Pulmonary:      Effort: Pulmonary effort is normal. No respiratory distress.      Breath sounds: Normal breath sounds.   Musculoskeletal:         General: No swelling or tenderness. Normal range of motion.   Skin:     General: Skin is warm and dry.   Neurological:      General: No focal deficit present.      " Mental Status: She is alert.      Comments: Oriented to person place and situation, not to time   Psychiatric:         Mood and Affect: Mood normal.         Laboratory:  Recent Labs     11/24/24 1953   WBC 10.6   RBC 5.31   HEMOGLOBIN 15.4   HEMATOCRIT 46.3   MCV 87.2   MCH 29.0   MCHC 33.3   RDW 49.1   PLATELETCT 400   MPV 10.1     Recent Labs     11/24/24 1953   SODIUM 141   POTASSIUM 4.1   CHLORIDE 103   CO2 23   GLUCOSE 105*   BUN 8   CREATININE 0.73   CALCIUM 9.8     Recent Labs     11/24/24 1953   ALTSGPT 16   ASTSGOT 29   ALKPHOSPHAT 107*   TBILIRUBIN 0.5   GLUCOSE 105*         No results for input(s): \"NTPROBNP\" in the last 72 hours.      No results for input(s): \"TROPONINT\" in the last 72 hours.    Imaging:  CT-CSPINE WITHOUT PLUS RECONS   Final Result         1.  No acute traumatic bony injury of the cervical spine is apparent.   2.  Scattered patchy bilateral groundglass opacities suggesting edema or atypical infiltrates      CT-HEAD W/O   Final Result         1.  No acute intracranial abnormality.               DX-FEMUR-2+ LEFT   Final Result         1.  No radiographic evidence of acute traumatic injury.      DX-PELVIS-1 OR 2 VIEWS   Final Result         1.  No acute traumatic bony injury.      DX-SHOULDER 2+ LEFT   Final Result         1.  No acute traumatic bony injury.         DX-CHEST-PORTABLE (1 VIEW)   Final Result         1.  Bilateral basilar atelectasis, no focal infiltrate      MR-BRAIN-WITH & W/O    (Results Pending)       X-Ray:  I have personally reviewed the images and compared with prior images. and My impression is: No acute process  EKG:  I have personally reviewed the images and compared with prior images. and My impression is: NSR    Assessment/Plan:  Justification for Admission Status  I anticipate this patient will require at least two midnights for appropriate medical management, necessitating inpatient admission because new seizure    * Seizure (HCC)- (present on " admission)  Assessment & Plan  Witnessed seizure in the ED, she was brought in after an unwitnessed fall and being found down, possible seizure at home as well  No history of seizures.  CT head reviewed and unremarkable  No changes in medications, denies alcohol or drug use  History of insomnia but no reported issues recently  Admit for continued monitoring, MRI, EEG  Neurology consult in the morning    Unwitnessed fall- (present on admission)  Assessment & Plan  Possible seizure, see above  Imaging in the ED following fall shows no acute abnormality.  She does not have any pain at this time  CPK ordered due to unknown downtime and likely seizures    Hypothyroidism (acquired)- (present on admission)  Assessment & Plan  On levothyroxine.  Labs 07/2024 showed she was hyperthyroid.  She and her  said there were no changes made to her medications,  she does have exophthalmos on exam  Labs today are normal  Says she is compliant with her meds. Continue levothyroxine    Mixed hyperlipidemia- (present on admission)  Assessment & Plan  Continue statin        VTE prophylaxis: enoxaparin ppx

## 2024-11-25 NOTE — ASSESSMENT & PLAN NOTE
11/25:  Patient developed hypotension after admission  Has responded to IV fluids  Does not appear to be septic  Continue to monitor

## 2024-11-25 NOTE — PROGRESS NOTES
Medication history reviewed with pt and pts pharmacy ([email or web address]). Med rec is complete.  Allergies reviewed, per pt  Interviewed pt with  at bedside with   permission from pt.    Called [facility] at [phone] to verify all medications. Pt filled ATORVASTATIN 10MG and ROSUVASTATIN 5MG on 11/14/2024.  Pt is not sure which one she I taking.    Pt reports that she has not taken her RECLAST 5MG/100ML in the last 3 years.    Patient has not had any outpatient antibiotics in the last 30 days.    Pt is not on any anticoagulants

## 2024-11-25 NOTE — ED NOTES
Report taken and assumed care from [facility] paramedic. Pt placed on NRB at 10L and on all monitors. Seizure pads in place. FSBS 92.

## 2024-11-26 ENCOUNTER — APPOINTMENT (OUTPATIENT)
Dept: RADIOLOGY | Facility: MEDICAL CENTER | Age: 79
End: 2024-11-26
Attending: HOSPITALIST
Payer: MEDICARE

## 2024-11-26 ENCOUNTER — PHARMACY VISIT (OUTPATIENT)
Dept: PHARMACY | Facility: MEDICAL CENTER | Age: 79
End: 2024-11-26
Payer: COMMERCIAL

## 2024-11-26 VITALS
OXYGEN SATURATION: 92 % | TEMPERATURE: 98.1 F | WEIGHT: 165.34 LBS | BODY MASS INDEX: 29.3 KG/M2 | HEIGHT: 63 IN | DIASTOLIC BLOOD PRESSURE: 60 MMHG | SYSTOLIC BLOOD PRESSURE: 130 MMHG | HEART RATE: 57 BPM | RESPIRATION RATE: 17 BRPM

## 2024-11-26 PROCEDURE — 700102 HCHG RX REV CODE 250 W/ 637 OVERRIDE(OP): Performed by: HOSPITALIST

## 2024-11-26 PROCEDURE — 73700 CT LOWER EXTREMITY W/O DYE: CPT | Mod: LT

## 2024-11-26 PROCEDURE — 94760 N-INVAS EAR/PLS OXIMETRY 1: CPT

## 2024-11-26 PROCEDURE — A9270 NON-COVERED ITEM OR SERVICE: HCPCS | Performed by: STUDENT IN AN ORGANIZED HEALTH CARE EDUCATION/TRAINING PROGRAM

## 2024-11-26 PROCEDURE — 700102 HCHG RX REV CODE 250 W/ 637 OVERRIDE(OP): Performed by: STUDENT IN AN ORGANIZED HEALTH CARE EDUCATION/TRAINING PROGRAM

## 2024-11-26 PROCEDURE — A9270 NON-COVERED ITEM OR SERVICE: HCPCS | Performed by: HOSPITALIST

## 2024-11-26 PROCEDURE — RXMED WILLOW AMBULATORY MEDICATION CHARGE: Performed by: INTERNAL MEDICINE

## 2024-11-26 PROCEDURE — 99239 HOSP IP/OBS DSCHRG MGMT >30: CPT | Performed by: INTERNAL MEDICINE

## 2024-11-26 RX ORDER — LEVETIRACETAM 100 MG/ML
500 SOLUTION ORAL EVERY 12 HOURS
Qty: 300 ML | Refills: 3 | Status: SHIPPED | OUTPATIENT
Start: 2024-11-26 | End: 2024-12-10 | Stop reason: SDUPTHER

## 2024-11-26 RX ORDER — LEVETIRACETAM 500 MG/1
500 TABLET ORAL 2 TIMES DAILY
Qty: 60 TABLET | Refills: 2 | Status: SHIPPED | OUTPATIENT
Start: 2024-11-26 | End: 2024-11-26

## 2024-11-26 RX ADMIN — LEVETIRACETAM 500 MG: 500 SOLUTION ORAL at 06:41

## 2024-11-26 RX ADMIN — ATORVASTATIN CALCIUM 5 MG: 10 TABLET, FILM COATED ORAL at 06:41

## 2024-11-26 RX ADMIN — LEVOTHYROXINE SODIUM 100 MCG: 0.1 TABLET ORAL at 06:41

## 2024-11-26 ASSESSMENT — FIBROSIS 4 INDEX: FIB4 SCORE: 1.66

## 2024-11-26 ASSESSMENT — PAIN DESCRIPTION - PAIN TYPE: TYPE: ACUTE PAIN

## 2024-11-26 NOTE — CARE PLAN
The patient is Stable - Low risk of patient condition declining or worsening    Shift Goals  Clinical Goals: seizure precautions, monitor labs  Patient Goals: comfort  Family Goals: comfort    Progress made toward(s) clinical / shift goals:    Problem: Knowledge Deficit - Standard  Goal: Patient and family/care givers will demonstrate understanding of plan of care, disease process/condition, diagnostic tests and medications  Description: Target End Date:  1-3 days or as soon as patient condition allows    Document in Patient Education    1.  Patient and family/caregiver oriented to unit, equipment, visitation policy and means for communicating concern  2.  Complete/review Learning Assessment  3.  Assess knowledge level of disease process/condition, treatment plan, diagnostic tests and medications  4.  Explain disease process/condition, treatment plan, diagnostic tests and medications  Outcome: Met     Problem: Fall Risk  Goal: Patient will remain free from falls  Description: Target End Date:  Prior to discharge or change in level of care    Document interventions on the [place] Fall Risk Assessment    1.  Assess for fall risk factors  2.  Implement fall precautions  Outcome: Met  Note: Pt with steady ambulation     Problem: Pain - Standard  Goal: Alleviation of pain or a reduction in pain to the patient’s comfort goal  Description: Target End Date:  Prior to discharge or change in level of care    Document on Vitals flowsheet    1.  Document pain using the appropriate pain scale per order or unit policy  2.  Educate and implement non-pharmacologic comfort measures (i.e. relaxation, distraction, massage, cold/heat therapy, etc.)  3.  Pain management medications as ordered  4.  Reassess pain after pain med administration per policy  5.  If opiods administered assess patient's response to pain medication is appropriate per POSS sedation scale  6.  Follow pain management plan developed in collaboration with  patient and interdisciplinary team (including palliative care or pain specialists if applicable)  Outcome: Met  Note: Declines pain at this time       Patient is not progressing towards the following goals:

## 2024-11-26 NOTE — PROGRESS NOTES
Telemetry Shift Summary     Rhythm: SB/SR  HR: 52-72  Ectopy: rPAC/rPVC    Measurements: .16/.08/.48

## 2024-11-26 NOTE — PROGRESS NOTES
Tele box and IV removed. Discharge paperwork reviewed with patient and , copy provided. Pt with meds 2 beds, pt given several medicine cups with details of amount per dose. Pt left with family and patient transport.

## 2024-11-26 NOTE — CARE PLAN
The patient is Stable - Low risk of patient condition declining or worsening    Shift Goals  Clinical Goals: monitor labs and vitals  Patient Goals: rest and comfort  Family Goals: update poc    Progress made toward(s) clinical / shift goals:    Problem: Knowledge Deficit - Standard  Goal: Patient and family/care givers will demonstrate understanding of plan of care, disease process/condition, diagnostic tests and medications  Outcome: Progressing     Problem: Fall Risk  Goal: Patient will remain free from falls  Outcome: Progressing     Problem: Pain - Standard  Goal: Alleviation of pain or a reduction in pain to the patient’s comfort goal  Outcome: Progressing       Patient is not progressing towards the following goals:

## 2024-11-27 ENCOUNTER — PATIENT OUTREACH (OUTPATIENT)
Dept: MEDICAL GROUP | Facility: LAB | Age: 79
End: 2024-11-27
Payer: MEDICARE

## 2024-11-27 ENCOUNTER — DOCUMENTATION (OUTPATIENT)
Dept: HEALTH INFORMATION MANAGEMENT | Facility: OTHER | Age: 79
End: 2024-11-27
Payer: MEDICARE

## 2024-11-27 ENCOUNTER — PATIENT MESSAGE (OUTPATIENT)
Dept: HEALTH INFORMATION MANAGEMENT | Facility: OTHER | Age: 79
End: 2024-11-27

## 2024-11-27 NOTE — PROGRESS NOTES
"Transitional Care Management  TCM Outreach Date and Time: Filed (11/27/2024  8:40 AM)    Discharge Questions  Actual Discharge Date: 11/26/24  Now that you are home, how are you feeling?: Good  Did you receive any new prescriptions?: Yes (Keppra)  Were you able to get them filled?: Yes  Meds to Bed or Pharmacy filled?: Meds to Bed  Do you have any questions about your current medications or new medications (Review Med Rec)?: No  Did you have any durable medical equipment ordered?: No  Do you have a follow up appointment scheduled with your PCP?: Yes  Appointment Date: 12/02/24  Appointment Time: 1020 (pt planning to go to [place] for winter, or at least for the next month)  Any issues or paperwork you wish to discuss with your PCP?: No  Are you (patient) able to get to the appointment?: Yes  If Home Health was ordered, have they contacted you (Patient): Not Applicable  Did you have enough support after your last discharge?: Yes  Does this patient qualify for the CCM program?: Yes (Routed to ccm rn)    Transitional Care  Number of attempts made to contact patient: 1  Current or previous attempts completed within two business days of discharge? : Yes  Provided education regarding treatment plan, medications, self-management, ADLs?: Yes  Has patient completed an Advanced Directive?: Yes  Is the patient's advanced directive on file?: Yes  Has the Care Manager's phone number provided?: No  Is there anything else I can help you with?: No    Discharge Summary  Chief Complaint: Syncope - Presents with spouse who reports he \"found her passed out in the bathroom\". Pt. Reports she fell on L side and has L shoulder, L hip, L lower mouth pain, and L elbow pain. Denies blood thinner use. PT. States she is unsure what happened. States she feels \"foggy\" and besides this and the aforementioned pain, she states she feels at baseline. Denies CP, SOB, dizziness, h/a.  Admitting Diagnosis: seizure - witnessed in ED  Discharge " Diagnosis: seizure

## 2024-11-28 NOTE — DISCHARGE SUMMARY
"Discharge Summary    CHIEF COMPLAINT ON ADMISSION  Chief Complaint   Patient presents with    Syncope     Presents with spouse who reports he \"found her passed out in the bathroom\". Pt. Reports she fell on L side and has L shoulder, L hip, L lower mouth pain, and L elbow pain. Denies blood thinner use. PT. States she is unsure what happened. States she feels \"foggy\" and besides this and the aforementioned pain, she states she feels at baseline. Denies CP, SOB, dizziness, h/a.       Reason for Admission  Syncope     Admission Date  11/24/2024    CODE STATUS  Prior    HPI & HOSPITAL COURSE  Yoselin Jorge has past medical history of hypothyroidism, hearing loss, dyslipidemia, and osteoporosis.  She also has a remote history of head neck cancer.  She does not drink alcohol or take benzodiazepines.  She has never had a seizure in the past.  The patient was found by her  passed out on the ground on 11/24/2024. She came to the emergency room.  While in the emergency room as she had a witnessed seizure.  Patient was hospitalized for further care and workup.  Patient had EEG that did not reveal any epileptiform activity, additionally MRI did not reveal any stroke or significant abnormality.  Clearly the patient had a witnessed seizure as well as probable seizure prior to presentation.  She did bite her inner lower lip during 1 of these episodes.  She has been started on Keppra, I did advise her about seizure precautions for safety as well as the fact that she cannot drive until she has been cleared by neurology.  I explained to them how a negative EEG does not rule out seizures and given that she had a witnessed event I feel pretty confident that she needs to be on medications as no reversible cause of the seizure could be found.  Patient and spouse verbalized understanding, form was completed for DMV and faxed by charge nurse.    Therefore, she is discharged in good and stable condition to home with close outpatient " follow-up.    The patient recovered much more quickly than anticipated on admission.    Discharge Date  11/26/2024    FOLLOW UP ITEMS POST DISCHARGE  Neurology seizure clinic  Primary care    DISCHARGE DIAGNOSES  Principal Problem:    Seizure (HCC) (POA: Yes)  Active Problems:    Mixed hyperlipidemia (POA: Yes)    Hypothyroidism (acquired) (POA: Yes)    Unwitnessed fall (POA: Yes)    Hypotension (POA: Yes)  Resolved Problems:    * No resolved hospital problems. *      FOLLOW UP  Future Appointments   Date Time Provider Department Center   12/2/2024 10:20 AM [facility]   12/10/2024  9:20 AM Debra León M.D. [facility] None     Brian Cutler M.D.  [address] [address]  [phone]            MEDICATIONS ON DISCHARGE     Medication List        START taking these medications        Instructions   levETIRAcetam 100 MG/ML Soln  Commonly known as: Keppra   Take 5 mL by mouth every 12 hours.  Dose: 500 mg            CHANGE how you take these medications        Instructions   amitriptyline 25 MG Tabs  What changed: when to take this  Commonly known as: Elavil   TAKE ONE TABLET BY MOUTH AT BEDTIME AS NEEDED            CONTINUE taking these medications        Instructions   atorvastatin 10 MG Tabs  Commonly known as: Lipitor   Take 1 Tablet by mouth every day.  Dose: 10 mg     ibuprofen 200 MG Tabs  Commonly known as: Motrin   Take 200 mg by mouth every 6 hours as needed. Indications: Pain  Dose: 200 mg     levothyroxine 100 MCG Tabs  Commonly known as: Synthroid   Take 1 Tablet by mouth every morning on an empty stomach.  Dose: 100 mcg     rosuvastatin 5 MG Tabs  Commonly known as: Crestor   Take 5 mg by mouth every evening.  Dose: 5 mg            STOP taking these medications      zoledronic Acid 5 MG/100ML Soln IVPB premix  Commonly known as: Reclast              Allergies  No Known Allergies    DIET  No orders of the defined types were placed in this  encounter.      ACTIVITY  Seizure precautions discussed in detail  No driving    CONSULTATIONS  none    PROCEDURES  none    LABORATORY  Lab Results   Component Value Date    SODIUM 140 11/25/2024    POTASSIUM 3.9 11/25/2024    CHLORIDE 106 11/25/2024    CO2 21 11/25/2024    GLUCOSE 114 (H) 11/25/2024    BUN 9 11/25/2024    CREATININE 0.67 11/25/2024        Lab Results   Component Value Date    WBC 10.8 11/25/2024    HEMOGLOBIN 12.8 11/25/2024    HEMATOCRIT 39.0 11/25/2024    PLATELETCT 341 11/25/2024        Total time of the discharge process exceeds 44 minutes.

## 2024-12-02 ENCOUNTER — APPOINTMENT (OUTPATIENT)
Dept: MEDICAL GROUP | Facility: LAB | Age: 79
End: 2024-12-02
Payer: MEDICARE

## 2024-12-02 VITALS
DIASTOLIC BLOOD PRESSURE: 72 MMHG | HEIGHT: 63 IN | HEART RATE: 58 BPM | OXYGEN SATURATION: 97 % | SYSTOLIC BLOOD PRESSURE: 132 MMHG | BODY MASS INDEX: 28 KG/M2 | RESPIRATION RATE: 16 BRPM | WEIGHT: 158 LBS | TEMPERATURE: 98 F

## 2024-12-02 DIAGNOSIS — Z09 HOSPITAL DISCHARGE FOLLOW-UP: Primary | ICD-10-CM

## 2024-12-02 DIAGNOSIS — R56.9 SEIZURE (HCC): ICD-10-CM

## 2024-12-02 DIAGNOSIS — M85.89 OSTEOPENIA OF MULTIPLE SITES: ICD-10-CM

## 2024-12-02 DIAGNOSIS — K21.9 GASTROESOPHAGEAL REFLUX DISEASE, UNSPECIFIED WHETHER ESOPHAGITIS PRESENT: ICD-10-CM

## 2024-12-02 DIAGNOSIS — Z91.89 FRACTURE RISK ASSESSMENT SCORE (FRAX) INDICATING GREATER THAN 3% RISK FOR HIP FRACTURE: ICD-10-CM

## 2024-12-02 DIAGNOSIS — Z91.89 FRACTURE RISK ASSESSMENT SCORE (FRAX) INDICATING GREATER THAN 20% RISK FOR MAJOR OSTEOPOROSIS-RELATED FRACTURE: ICD-10-CM

## 2024-12-02 RX ORDER — EPINEPHRINE 1 MG/ML(1)
0.5 AMPUL (ML) INJECTION PRN
OUTPATIENT
Start: 2024-12-09

## 2024-12-02 RX ORDER — LIDOCAINE HYDROCHLORIDE 20 MG/ML
SOLUTION OROPHARYNGEAL
COMMUNITY
Start: 2024-10-23 | End: 2024-12-10

## 2024-12-02 RX ORDER — DIPHENHYDRAMINE HYDROCHLORIDE 50 MG/ML
50 INJECTION INTRAMUSCULAR; INTRAVENOUS PRN
OUTPATIENT
Start: 2024-12-09

## 2024-12-02 RX ORDER — PANTOPRAZOLE SODIUM 20 MG/1
20 TABLET, DELAYED RELEASE ORAL DAILY
Qty: 90 TABLET | Refills: 3 | Status: SHIPPED | OUTPATIENT
Start: 2024-12-02

## 2024-12-02 RX ORDER — METHYLPREDNISOLONE SODIUM SUCCINATE 125 MG/2ML
125 INJECTION, POWDER, LYOPHILIZED, FOR SOLUTION INTRAMUSCULAR; INTRAVENOUS PRN
OUTPATIENT
Start: 2024-12-09

## 2024-12-02 RX ORDER — PANTOPRAZOLE SODIUM 20 MG/1
TABLET, DELAYED RELEASE ORAL
COMMUNITY
Start: 2024-10-11 | End: 2024-12-02 | Stop reason: SDUPTHER

## 2024-12-02 ASSESSMENT — FIBROSIS 4 INDEX: FIB4 SCORE: 1.66

## 2024-12-02 NOTE — PROGRESS NOTES
"Subjective:     Yoselin Jorge is a 79 y.o. female who presents for Hospital Follow-up.    Transitional Care Management  TCM Outreach Date and Time: Filed (11/27/2024  8:40 AM)    Discharge Questions  Actual Discharge Date: 11/26/24  Now that you are home, how are you feeling?: Good  Did you receive any new prescriptions?: Yes (Keppra)  Were you able to get them filled?: Yes  Meds to Bed or Pharmacy filled?: Meds to Bed  Do you have any questions about your current medications or new medications (Review Med Rec)?: No  Did you have any durable medical equipment ordered?: No  Do you have a follow up appointment scheduled with your PCP?: Yes  Appointment Date: 12/02/24  Appointment Time: 1020 (pt planning to go to [place] for winter, or at least for the next month)  Any issues or paperwork you wish to discuss with your PCP?: No  Are you (patient) able to get to the appointment?: Yes  If Home Health was ordered, have they contacted you (Patient): Not Applicable  Did you have enough support after your last discharge?: Yes  Does this patient qualify for the CCM program?: Yes (Routed to ccm rn)    Transitional Care  Number of attempts made to contact patient: 1  Current or previous attempts completed within two business days of discharge? : Yes  Provided education regarding treatment plan, medications, self-management, ADLs?: Yes  Has patient completed an Advanced Directive?: Yes  Is the patient's advanced directive on file?: Yes  Has the Care Manager's phone number provided?: No  Is there anything else I can help you with?: No    Discharge Summary  Chief Complaint: Syncope - Presents with spouse who reports he \"found her passed out in the bathroom\". Pt. Reports she fell on L side and has L shoulder, L hip, L lower mouth pain, and L elbow pain. Denies blood thinner use. PT. States she is unsure what happened. States she feels \"foggy\" and besides this and the aforementioned pain, she states she feels at baseline. " "Denies CP, SOB, dizziness, h/a.  Admitting Diagnosis: seizure - witnessed in ED  Discharge Diagnosis: seizure      HPI:   Recently hospitalized for new onset seizures with witnessed episode in the emergency department.  Discharge summary as below:    \"Yoselin Jorge has past medical history of hypothyroidism, hearing loss, dyslipidemia, and osteoporosis.  She also has a remote history of head neck cancer.  She does not drink alcohol or take benzodiazepines.  She has never had a seizure in the past.  The patient was found by her  passed out on the ground on 11/24/2024. She came to the emergency room.  While in the emergency room as she had a witnessed seizure.  Patient was hospitalized for further care and workup.  Patient had EEG that did not reveal any epileptiform activity, additionally MRI did not reveal any stroke or significant abnormality.  Clearly the patient had a witnessed seizure as well as probable seizure prior to presentation.  She did bite her inner lower lip during 1 of these episodes.  She has been started on Keppra, I did advise her about seizure precautions for safety as well as the fact that she cannot drive until she has been cleared by neurology.  I explained to them how a negative EEG does not rule out seizures and given that she had a witnessed event I feel pretty confident that she needs to be on medications as no reversible cause of the seizure could be found.  Patient and spouse verbalized understanding, form was completed for DMV and faxed by charge nurse.     Therefore, she is discharged in good and stable condition to home with close outpatient follow-up.\"    Today patient states she is doing well.  Is compliant with Keppra, taking daily.  Denies any side effects of lethargy, somnolence.  No prior seizure history, no seizure history since being discharged.  Does have follow-up with neurology on 12/10/2024.    Patient continues to have some leg cramps.  Upon medication review she " "has been taking both rosuvastatin and atorvastatin together.    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  She has been having ongoing symptoms including metallic taste in mouth.  Has not been taking pantoprazole daily.    Patient also has history of osteoporosis, was previously treated with Reclast injections.  Last injections approximately 2 years ago.  Has discontinued due to needed and ongoing dental work over the last year.    Current medicines (including reconciliation performed today)  Current Outpatient Medications   Medication Sig Dispense Refill    pantoprazole (PROTONIX) 20 MG tablet       lidocaine (XYLOCAINE) 2 % Solution       levETIRAcetam (KEPPRA) 100 MG/ML Solution Take 5 mL by mouth every 12 hours. 300 mL 3    rosuvastatin (CRESTOR) 5 MG Tab Take 5 mg by mouth every evening.      levothyroxine (SYNTHROID) 100 MCG Tab Take 1 Tablet by mouth every morning on an empty stomach. 100 Tablet 3    atorvastatin (LIPITOR) 10 MG Tab Take 1 Tablet by mouth every day. 100 Tablet 3    amitriptyline (ELAVIL) 25 MG Tab TAKE ONE TABLET BY MOUTH AT BEDTIME AS NEEDED (Patient not taking: Reported on 12/2/2024) 100 Tablet 3     No current facility-administered medications for this visit.       Allergies:   Patient has no known allergies.    Social History     Tobacco Use    Smoking status: Never    Smokeless tobacco: Never   Vaping Use    Vaping status: Never Used   Substance Use Topics    Alcohol use: Yes     Alcohol/week: 0.6 oz     Types: 1 Glasses of wine per week     Comment: on rare ocassion    Drug use: No       ROS:  Denies any fevers, chills, dizziness, seizure activity, urinary incontinence.    Objective:     Vitals:    12/02/24 1014   BP: 132/72   Pulse: (!) 58   Resp: 16   Temp: 36.7 °C (98 °F)   TempSrc: Temporal   SpO2: 97%   Weight: 71.7 kg (158 lb)   Height: 1.6 m (5' 2.99\")     Body mass index is 28 kg/m².    Physical " Exam:  Constitutional: Alert, no distress, well-groomed.  Skin: No rashes in visible areas.  Eye: Round. Conjunctiva clear, lids normal. No icterus.   ENMT: Lips pink without lesions, good dentition, moist mucous membranes. Phonation normal.  Neck: No masses, no thyromegaly. Moves freely without pain.  Respiratory: Unlabored respiratory effort, no cough or audible wheeze  Psych: Alert and oriented x3, normal affect and mood.    Assessment and Plan:     1. Hospital discharge follow-up (Primary)  - Chart and discharge summary were reviewed.   - Hospitalization and results reviewed with patient.   - Medications reviewed including instructions regarding high risk medications, dosing and side effects.  - Recommended Services: No services needed at this time  - Advance directive/POLST on file?  Yes    2. Seizure (HCC)  -Stable, no concerns at this time.  Will continue with Keppra.  Patient is no longer driving vehicle.  Will follow-up with neurology next week for further monitoring and evaluation.    3. Gastroesophageal reflux disease, unspecified whether esophagitis present  Chronic condition, unstable.  Will need to restart patient on Protonix.  Medication has been called in for refill.  Patient will follow-up with gastroenterology later this week.  - pantoprazole (PROTONIX) 20 MG tablet; Take 1 Tablet by mouth every day.  Dispense: 90 Tablet; Refill: 3    4. Osteopenia of multiple sites  -Chronic condition, unstable.  Given that she has not been able to be on any bisphosphonate due to dental work we are going to have to switch to Prolia at this time.  Therapy plan has been placed, patient will follow-up with infusion center for further treatment every 6 months.    5. Fracture Risk Assessment Score (FRAX) indicating greater than 20% risk for major osteoporosis-related fracture  -Chronic condition, unstable.  Given that she has not been able to be on any bisphosphonate due to dental work we are going to have to switch to  Prolia at this time.  Therapy plan has been placed, patient will follow-up with infusion center for further treatment every 6 months.    6. Fracture Risk Assessment Score (FRAX) indicating greater than 3% risk for hip fracture  -Chronic condition, unstable.  Given that she has not been able to be on any bisphosphonate due to dental work we are going to have to switch to Prolia at this time.  Therapy plan has been placed, patient will follow-up with infusion center for further treatment every 6 months.      Follow-up:No follow-ups on file.    Face-to-face transitional care management services with [facility] (today's visit is within days post discharge & LACE+ score 59+) medical decision complexity were provided.

## 2024-12-10 ENCOUNTER — OFFICE VISIT (OUTPATIENT)
Dept: NEUROLOGY | Facility: MEDICAL CENTER | Age: 79
End: 2024-12-10
Attending: STUDENT IN AN ORGANIZED HEALTH CARE EDUCATION/TRAINING PROGRAM
Payer: MEDICARE

## 2024-12-10 ENCOUNTER — TELEPHONE (OUTPATIENT)
Dept: PHARMACY | Facility: MEDICAL CENTER | Age: 79
End: 2024-12-10

## 2024-12-10 VITALS
RESPIRATION RATE: 14 BRPM | HEIGHT: 63 IN | BODY MASS INDEX: 28.09 KG/M2 | OXYGEN SATURATION: 96 % | DIASTOLIC BLOOD PRESSURE: 60 MMHG | WEIGHT: 158.51 LBS | SYSTOLIC BLOOD PRESSURE: 130 MMHG | HEART RATE: 57 BPM

## 2024-12-10 DIAGNOSIS — R20.8 DECREASED PERIPHERAL VIBRATORY SENSE: ICD-10-CM

## 2024-12-10 DIAGNOSIS — E55.9 VITAMIN D DEFICIENCY: ICD-10-CM

## 2024-12-10 DIAGNOSIS — R63.8 OTHER SYMPTOMS AND SIGNS CONCERNING FOOD AND FLUID INTAKE: ICD-10-CM

## 2024-12-10 DIAGNOSIS — R56.9 SEIZURE (HCC): ICD-10-CM

## 2024-12-10 DIAGNOSIS — R25.2 LEG CRAMPING: ICD-10-CM

## 2024-12-10 DIAGNOSIS — R56.9 SEIZURES (HCC): ICD-10-CM

## 2024-12-10 PROCEDURE — 99205 OFFICE O/P NEW HI 60 MIN: CPT | Performed by: STUDENT IN AN ORGANIZED HEALTH CARE EDUCATION/TRAINING PROGRAM

## 2024-12-10 PROCEDURE — 3078F DIAST BP <80 MM HG: CPT | Performed by: STUDENT IN AN ORGANIZED HEALTH CARE EDUCATION/TRAINING PROGRAM

## 2024-12-10 PROCEDURE — 99212 OFFICE O/P EST SF 10 MIN: CPT | Performed by: STUDENT IN AN ORGANIZED HEALTH CARE EDUCATION/TRAINING PROGRAM

## 2024-12-10 PROCEDURE — 3075F SYST BP GE 130 - 139MM HG: CPT | Performed by: STUDENT IN AN ORGANIZED HEALTH CARE EDUCATION/TRAINING PROGRAM

## 2024-12-10 RX ORDER — LEVETIRACETAM 100 MG/ML
500 SOLUTION ORAL EVERY 12 HOURS
Qty: 473 ML | Refills: 3 | Status: SHIPPED | OUTPATIENT
Start: 2024-12-10 | End: 2025-06-17

## 2024-12-10 ASSESSMENT — FIBROSIS 4 INDEX: FIB4 SCORE: 1.66

## 2024-12-10 NOTE — TELEPHONE ENCOUNTER
Received [employer]  request via [employer]  for levETIRAcetam (KEPPRA) 100 MG/ML Solution . (Quantity:473, Day Supply:47)     Insurance: [employer]  Member ID:  [id number]  BIN: [id number]  PCN: [id number]  Group: [id number]     Ran Test claim via [facility] & medication  RTC RTS  - will release to pt's preferred pharmacy on file.

## 2024-12-10 NOTE — PROGRESS NOTES
"[facility]    Patient name: Yoselin Jorge  YOB: 1945  MRN: [id number]  Referring provider: Akua Nj M.D.  [address] [place] [id number]   Date of visit: 12/10/2024     CC: seizure      HPI:    Yoselin Jorge is a 79 y.o. female who is referred for an initial neurologic consultation for seizures.     She is accompanied to clinic by her .     The events of concern occurred on 11/24/2024.    She was found at home on the floor by her  in their bedroom. She had been cleaning the bathroom. She seemed to have gone back into the bedroom and fell onto carpeted area. When her  went to her, she smiled at him and told him she took a nap on the floor. He helped her to sit up and she sat for a few minutes, then he helped her up and onto a chair. He told her they need to go to the ED to figure out why she fell. He was not with her immediately after the event, is unsure how long she was down for. She bit the bottom of her lip, no tongue biting. No urinary incontinence.     They went to the ED at [facility]. She then had a witnessed seizure in the ED. Her  notes that she had full body convulsions with wide eyes, mouth was open. She was given medication to abort the seizure, stopped after a couple of minutes. She was taken to a room and appeared confused afterwards, was sluggish and slow to answer questions.    She has no recollection of either episode.     She had an MRI and an EEG which were both unremarkable. She does not remember having the EEG done.    She has been taking Keppra 500 mg twice daily.    She has not had any staring spells, abnormal sensation, confusional episodes, epigastric rising, or other transient symptoms that raise concern for seizures.     Since the seizures, feels \"foggy\" mentally and aches all over.     Her blood pressures were very labile in the hospital. She reports taht her BP went up to 200 and there is a " nursing note indicating that she had a BP of 81/39 at one point during her hospitalization.     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     Reports having more stress than usual this year. This year she has had a lot of dental work. She recently underwent yearly esophageal dilation. She is unable to swallow big tablets, anything bigger than a baby aspirin.    She has nighttime leg cramping. This has been intermittent for 2 years. It can start at the hip down to the toes. This wakes her up 2-3 times per night. She uses a topical cream to help this.     She has not been driving.     She has felt off balance since having the seizures. No falls. She missed a bottom step about a year ago and had a lower back compression fracture. No issues since then.       Risk factors for epileptic seizures:  Normal birth history, no complications, born at term.   No history of significant head trauma.   No history of stroke or meningitis.   (+)  radiation to the head/neck region   (+) hyperostosis frontalis interna  No family history of epilepsy.   No febrile seizures.       Past Medical History:   Past Medical History:   Diagnosis Date    Exudative age-related macular degeneration of left eye with active choroidal neovascularization (HCC) 7/27/2020    Menopause 7/27/2020    Epigastric abdominal pain of unknown etiology 7/12/2019    Rib pain on right side 7/31/2018    Prediabetes 5/11/2017    S/P thyroidectomy 5/22/2015    S/P dilatation of esophageal stricture 5/16/2015    EGD 2015.    Shoulder pain 2014    Flat foot(734) 3/17/2013    Impaired fasting glucose 3/17/2013    SENSORINEURAL HEARING LOSS 3/17/2013    Hyperlipidemia 3/11/2013    History of nasopharyngeal cancer 3/11/2013    Family history of melanoma 3/11/2013    Other lymphedema 3/11/2013    Cancer (HCC) 2007    nasopharangeal     Heart burn     Herniated cervical disc     L4-  L5 right side-Dr. Weinstein    Indigestion     Unspecified cataract     bilater. IOL    Unspecified urinary incontinence        Past Surgical History:   Past Surgical History:   Procedure Laterality Date    VITRECTOMY POSTERIOR Left 12/17/2019    Procedure: REMOVAL, VITREOUS BODY, POSTERIOR PORTION-MEMBRANE PEEL POSSIBLE LASER GAS OR OIL;  Surgeon: Arlyn Dawson M.D.;  Location: SURGERY SAME DAY [facility];  Service: Ophthalmology    THYROIDECTOMY TOTAL  11/19/2014    Performed by Lukas De Santiago M.D. at SURGERY SAME DAY [facility]    BICEPS TENODESIS  5/22/2014    Performed by Cain Gerardo M.D. at SURGERY [facility]    SHOULDER ARTHROSCOPY W/ ROTATOR CUFF REPAIR  5/22/2014    Performed by Cain Gerardo M.D. at SURGERY [facility]    SHOULDER DECOMPRESSION ARTHROSCOPIC  5/22/2014    Performed by Cain Gerardo M.D. at SURGERY [facility]    CLAVICLE DISTAL EXCISION  5/22/2014    Performed by Cain Gerardo M.D. at SURGERY [facility]    [place]    [facility] [place]OPEDIC SURGERY  2006    reconstruction  left foot    BUNIONECTOMY  1988    CHOLECYSTECTOMY  1988    TUBAL LIGATION  1982    CATARACT PHACO WITH IOL      Ry    GYN SURGERY      tubal    TONSILLECTOMY      US-NEEDLE CORE BX-BREAST PANEL         Social History:   Social History     Socioeconomic History    Marital status:      Spouse name: Not on file    Number of children: 2    Years of education: college    Highest education level: Bachelor's degree (e.g., BA, AB, BS)   Occupational History    Occupation: Retired     Employer: OTHER   Tobacco Use    Smoking status: Never    Smokeless tobacco: Never   Vaping Use    Vaping status: Never Used   Substance and Sexual Activity    Alcohol use: Yes     Alcohol/week: 0.6 oz     Types: 1 Glasses of wine per week     Comment: on rare ocassion    Drug use: No    Sexual activity: Never     Comment: , retired from sales   Other Topics Concern     Not on file   Social History Narrative    Retired Sales.    . 2 children.    Moved [place] to [place] 2011.      Social Drivers of Health     Financial Resource Strain: Low Risk  (8/14/2024)    Overall Financial Resource Strain (CARDIA)     Difficulty of Paying Living Expenses: Not hard at all   Food Insecurity: No Food Insecurity (11/24/2024)    Hunger Vital Sign     Worried About Running Out of Food in the Last Year: Never true     Ran Out of Food in the Last Year: Never true   Transportation Needs: No Transportation Needs (11/24/2024)    PRAPARE - Transportation     Lack of Transportation (Medical): No     Lack of Transportation (Non-Medical): No   Physical Activity: Insufficiently Active (2/4/2021)    Exercise Vital Sign     Days of Exercise per Week: 1 day     Minutes of Exercise per Session: 20 min   Stress: No Stress Concern Present (2/4/2021)    [employer] - Occupational Stress Questionnaire     Feeling of Stress : Not at all   Social Connections: Moderately Integrated (2/4/2021)    Social Connection and Isolation Panel [NHANES]     Frequency of Communication with Friends and Family: More than three times a week     Frequency of Social Gatherings with Friends and Family: Twice a week     Attends Ep[employer] Services: More than 4 times per year     Active Member of Clubs or Organizations: No     Attends Club or Organization Meetings: Never     Marital Status:    Intimate Partner Violence: Not At Risk (11/24/2024)    Humiliation, Afraid, Rape, and Kick questionnaire     Fear of Current or Ex-Partner: No     Emotionally Abused: No     Physically Abused: No     Sexually Abused: No   Housing Stability: Low Risk  (11/24/2024)    Housing Stability Vital Sign     Unable to Pay for Housing in the Last Year: No     Number of Times Moved in the Last Year: 1     Homeless in the Last Year: No       Family Hx:   Family History   Problem Relation Age of Onset    Diabetes  Father     Hypertension Father     Hyperlipidemia Father     Stroke Father     Lung Disease Mother 68        Emphazema    Heart Disease Mother         CHF    Cancer Brother 55        liver cancer    Other Brother         Melanoma    Hypertension Brother     Cancer Brother 55        Prostate cancer    Heart Disease Maternal Grandmother 63       Current Medications:   Current Outpatient Medications:     pantoprazole (PROTONIX) 20 MG tablet, Take 1 Tablet by mouth every day., Disp: 90 Tablet, Rfl: 3    levETIRAcetam (KEPPRA) 100 MG/ML Solution, Take 5 mL by mouth every 12 hours., Disp: 300 mL, Rfl: 3    levothyroxine (SYNTHROID) 100 MCG Tab, Take 1 Tablet by mouth every morning on an empty stomach., Disp: 100 Tablet, Rfl: 3    atorvastatin (LIPITOR) 10 MG Tab, Take 1 Tablet by mouth every day., Disp: 100 Tablet, Rfl: 3    amitriptyline (ELAVIL) 25 MG Tab, TAKE ONE TABLET BY MOUTH AT BEDTIME AS NEEDED, Disp: 100 Tablet, Rfl: 3    Allergies: No Known Allergies      Physical Exam:   Ambulatory Vitals  Vitals:    12/10/24 0910   BP: 130/60   Pulse: (!) 57   Resp: 14   SpO2: 96%       Constitutional: Well-developed, well-nourished, good hygiene. Appears stated age.  Cardiovascular: RRR, with no murmurs, rubs or gallops. No carotid bruits. No peripheral edema, pedal pulses intact.   Respiratory: Lungs CTA B/L, no W/R/R.   Skin: Warm, dry, intact. No rashes observed.  Neurologic:   Mental Status: Awake, alert, oriented x 3.   Speech: Fluent with normal prosody.   Memory: Able to recall 3 words at 1 minute and 5 minutes. Able to recall recent and remote events accurately.    Concentration: Attentive. Able to focus on history and follow multi-step commands.   Fund of Knowledge: Appropriate.   Cranial Nerves:    CN II: PERRL     CN III, IV, VI: EOMI without nystagmus    CN V: Facial sensation intact and symmetric in all 3 trigeminal distributions    CN VII: No facial asymmetry    CN VIII: Hearing intact to finger rub     CN IX  and X: Palate elevates symmetrically, gag reflex not tested    CN XI: Symmetric shoulder shrug     CN XII: Tongue midline   Motor: 5/5 in upper and lower extremities bilaterally   Sensory: Intact light touch, vibration and temperature diffusely    Coordination: No evidence of past-pointing on finger to nose testing, no dysdiadochokinesia. Heel to shin intact.    DTR's: 2+ patellar, 0 Achilles    Babinski: Toes mute bilaterally.   Gait: ambulates steadily without assistive device. Romberg negative.   Movements: No resting tremors or abnormal movements observed.   Musculoskeletal:    Strength: as above   Tone: Normal bulk and tone   Joints: No swelling    Studies:      Labs reviewed:      Imaging:   MRI Brain wwo contrast 11/25/2024 (personally reviewed):      IMPRESSION:        1.  Hyperostosis frontalis interna.     2.  Age-related cerebral atrophy.     3.  Mild periventricular white matter changes, most prominently centrally in the right cerebellar hemisphere, consistent with chronic microvascular ischemic gliosis.      EEG Results:   Routine EEG 11/25/2024:  INTERPRETATION:   Normal video EEG recording in the awake state(s):  - No regional slowing or persistent focal asymmetries were seen.  - No epileptiform discharges or other epileptiform phenomena seen.  - No seizures. Clinical correlation is recommended.        Note: A normal EEG does not rule out the possibility of seizures or exclude a diagnosis of epilepsy.  If the clinical suspicion remains high for seizures, a prolonged recording to capture clinical or subclinical events may be helpful.        Assessment/Plan:   Yoselin Jorge is a 79 y.o. woman with a history of two lifetime seizures in < 24 hour period who is being seen in follow-up after her hospital visit for seizure.    She underwent an MRI of the brain with and without contrast which was abnormal and showed hyperostosis of the frontal bones left greater than right, which appears to cause mild  indentation of the cortex.  I reviewed these images with the patient and her .  It is possible that cortical indentation from the frontal bone could be a nidus for seizure activity.  Agree with empiric Keppra 500 mg twice daily.  I refilled this medication in liquid form, as she has longstanding difficulty swallowing after being treated for nasopharyngeal carcinoma, which has rendered her unable to swallow pills larger than a baby aspirin.     I also discussed the results of the routine EEG, which captured the awake state only.  I recommended and ordered a longer 24-hour ambulatory EEG since capturing sleep will allow us to better detect interictal abnormalities.     The patient is aware to contact me if she has any events concerning for breakthrough seizures.  Discussed typical symptoms of seizure to be aware of.    Counseling was provided regarding seizure safety, risk factors for breakthrough seizures including poor sleep, stress, being ill with a fever, drugs or alcohol.  She does not use drugs or drink alcohol.      Also recommended vitamin D 2000 to 5000 international units daily for bone health with concomitant antiseizure medication use.    She is not driving.  She is aware of the [place] state law of no driving until seizure-free for at least 3 months    Exam is notable for distal impaired vibratory sensation; B12 and folate levels ordered.  She also has glucose monitoring through her primary care.    For leg cramping at night, check ferritin. Recommended magnesium supplementation.           Patient instructions:  Please let our office know if you have any changes in your seizure frequency and/characteristics. Otherwise, please keep the diary of your events and bring it with you at the time of your next follow up visit with our office.     Please take vitamin D3 1330-5345 internation units daily.     Please note that the following might precipitate seizures: missed doses of antiseizure medications,  being sick with fever, stress, fatigue, sleep deprivation, not eating regularly, not drinking enough water, drinking too much alcohol, stopping alcohol suddenly if you are currently using it on a regular/daily basis, and/or using recreational drugs, among others.    Please note that the following might lead to an injury, potentially a life-threatening injury, in case you have a seizure and/or lose awareness while:   - being in a large body of water by yourself, such as bath, pool, lake, ocean, among others (risk of drowning)   - being on unprotected heights (risk of fall)   - being around and/or operating heavy machinery (risk of injury)   - being around open fire/hot surfaces (risk of burns)   - any other activities/circumstances, in which if you lose awareness, you might injure yourself and/or others.    Please call for help (crisis line and/or 911) in case you have thoughts of harming yourself and/or others.    Please abstain from driving until further notice.    ------------------------------------------------------------------------------------------  Instructions for your family/caregivers:  Please call 911 if the patient has a seizure longer than 2-3 minutes, if seizures are back to back without her recovering to her baseline, or she does not start recovering within 5-10 minutes after the seizure stops. During the seizure - please turn her on her side, please make sure her head is protected (for example, you should put a pillow under her head, if one is available), and please do not put anything in her mouth.   -------------------------------------------------------------------------------------------    Due to the high volume of patients we are trying to help, your physician will not be able to respond by phone or in JOORhart to your routine concerns between appointments.  This does not reflect a lack of interest or concern for you or your diagnosis.  Please bring these questions and concerns to your  appointment where your physician can answer.  Please relay more pressing concerns to our office, either via MyChart, or by phone; if not able to reach us please visit nearby Urgent Care Center or Emergency Department.  If any emergent medical needs, please seek emergent medical help and/or call 911.    Please note that we are not able to fill out paperwork that might be related to your work, utility company, disability, and/or driving, among others, in between the visits.  Please schedule a dedicated appointment to address your paperwork, so we can do that in a timely manner.  This is not due to lack of concern or interest for your disease-related work/administrative problems, but to make sure that we provide the best possible care and to fill out your paperwork in a correct and timely manner.    Thank you for entrusting your neurological care to [facility] and we look forward to continuing to serve you.         Follow-up in 3 months.    Debra León M.D.   Diplomate, Neurology with Special Qualification in Epilepsy, American Board of Psychiatry and Neurology   of Clinical Neurology, [facility] of [facility]  Level III Epilepsy Center, Department of Neurology at [facility]       During today's encounter we discussed available treatment options and their individual side effect profiles. Total encounter time caring for patient today 71 minutes.

## 2024-12-10 NOTE — PATIENT INSTRUCTIONS
Please let our office know if you have any changes in your seizure frequency and/characteristics. Otherwise, please keep the diary of your events and bring it with you at the time of your next follow up visit with our office.     Please take vitamin D3 0770-5389 internation units daily.     Please note that the following might precipitate seizures: missed doses of antiseizure medications, being sick with fever, stress, fatigue, sleep deprivation, not eating regularly, not drinking enough water, drinking too much alcohol, stopping alcohol suddenly if you are currently using it on a regular/daily basis, and/or using recreational drugs, among others.    Please note that the following might lead to an injury, potentially a life-threatening injury, in case you have a seizure and/or lose awareness while:   - being in a large body of water by yourself, such as bath, pool, lake, ocean, among others (risk of drowning)   - being on unprotected heights (risk of fall)   - being around and/or operating heavy machinery (risk of injury)   - being around open fire/hot surfaces (risk of burns)   - any other activities/circumstances, in which if you lose awareness, you might injure yourself and/or others.    Please call for help (crisis line and/or 911) in case you have thoughts of harming yourself and/or others.    Please abstain from driving until further notice.    ------------------------------------------------------------------------------------------  Instructions for your family/caregivers:  Please call 911 if the patient has a seizure longer than 2-3 minutes, if seizures are back to back without her recovering to her baseline, or she does not start recovering within 5-10 minutes after the seizure stops. During the seizure - please turn her on her side, please make sure her head is protected (for example, you should put a pillow under her head, if one is available), and please do not put anything in her mouth.    -------------------------------------------------------------------------------------------    Due to the high volume of patients we are trying to help, your physician will not be able to respond by phone or in NanoViricideshart to your routine concerns between appointments.  This does not reflect a lack of interest or concern for you or your diagnosis.  Please bring these questions and concerns to your appointment where your physician can answer.  Please relay more pressing concerns to our office, either via NanoViricideshart, or by phone; if not able to reach us please visit nearby Urgent Care Center or Emergency Department.  If any emergent medical needs, please seek emergent medical help and/or call 911.    Please note that we are not able to fill out paperwork that might be related to your work, utility company, disability, and/or driving, among others, in between the visits.  Please schedule a dedicated appointment to address your paperwork, so we can do that in a timely manner.  This is not due to lack of concern or interest for your disease-related work/administrative problems, but to make sure that we provide the best possible care and to fill out your paperwork in a correct and timely manner.    Thank you for entrusting your neurological care to [facility] and we look forward to continuing to serve you.

## 2024-12-11 ENCOUNTER — HOSPITAL ENCOUNTER (OUTPATIENT)
Dept: LAB | Facility: MEDICAL CENTER | Age: 79
End: 2024-12-11
Attending: STUDENT IN AN ORGANIZED HEALTH CARE EDUCATION/TRAINING PROGRAM
Payer: MEDICARE

## 2024-12-11 DIAGNOSIS — E55.9 VITAMIN D DEFICIENCY: ICD-10-CM

## 2024-12-11 DIAGNOSIS — R20.8 DECREASED PERIPHERAL VIBRATORY SENSE: ICD-10-CM

## 2024-12-11 DIAGNOSIS — R63.8 OTHER SYMPTOMS AND SIGNS CONCERNING FOOD AND FLUID INTAKE: ICD-10-CM

## 2024-12-11 DIAGNOSIS — R25.2 LEG CRAMPING: ICD-10-CM

## 2024-12-11 LAB
25(OH)D3 SERPL-MCNC: 35 NG/ML (ref 30–100)
FERRITIN SERPL-MCNC: 96.3 NG/ML (ref 10–291)
VIT B12 SERPL-MCNC: 287 PG/ML (ref 211–911)

## 2024-12-11 PROCEDURE — 82306 VITAMIN D 25 HYDROXY: CPT

## 2024-12-11 PROCEDURE — 82746 ASSAY OF FOLIC ACID SERUM: CPT

## 2024-12-11 PROCEDURE — 36415 COLL VENOUS BLD VENIPUNCTURE: CPT

## 2024-12-11 PROCEDURE — 82728 ASSAY OF FERRITIN: CPT

## 2024-12-11 PROCEDURE — 80177 DRUG SCRN QUAN LEVETIRACETAM: CPT

## 2024-12-11 PROCEDURE — 82607 VITAMIN B-12: CPT

## 2024-12-12 LAB — FOLATE SERPL-MCNC: 10.3 NG/ML

## 2024-12-13 ENCOUNTER — OFFICE VISIT (OUTPATIENT)
Dept: MEDICAL GROUP | Facility: LAB | Age: 79
End: 2024-12-13
Payer: MEDICARE

## 2024-12-13 VITALS
WEIGHT: 158 LBS | OXYGEN SATURATION: 98 % | DIASTOLIC BLOOD PRESSURE: 64 MMHG | HEIGHT: 63 IN | TEMPERATURE: 97.8 F | RESPIRATION RATE: 16 BRPM | BODY MASS INDEX: 28 KG/M2 | SYSTOLIC BLOOD PRESSURE: 136 MMHG | HEART RATE: 62 BPM

## 2024-12-13 DIAGNOSIS — G89.29 CHRONIC LEFT SHOULDER PAIN: ICD-10-CM

## 2024-12-13 DIAGNOSIS — G25.81 RESTLESS LEG: ICD-10-CM

## 2024-12-13 DIAGNOSIS — M25.512 CHRONIC LEFT SHOULDER PAIN: ICD-10-CM

## 2024-12-13 DIAGNOSIS — M81.0 AGE-RELATED OSTEOPOROSIS WITHOUT CURRENT PATHOLOGICAL FRACTURE: ICD-10-CM

## 2024-12-13 DIAGNOSIS — R03.0 ELEVATED BLOOD PRESSURE, SITUATIONAL: ICD-10-CM

## 2024-12-13 DIAGNOSIS — F51.01 PRIMARY INSOMNIA: ICD-10-CM

## 2024-12-13 PROCEDURE — 3075F SYST BP GE 130 - 139MM HG: CPT | Performed by: FAMILY MEDICINE

## 2024-12-13 PROCEDURE — 99214 OFFICE O/P EST MOD 30 MIN: CPT | Performed by: FAMILY MEDICINE

## 2024-12-13 PROCEDURE — 3078F DIAST BP <80 MM HG: CPT | Performed by: FAMILY MEDICINE

## 2024-12-13 ASSESSMENT — FIBROSIS 4 INDEX: FIB4 SCORE: 1.66

## 2024-12-13 NOTE — PROGRESS NOTES
Verbal consent was acquired by the patient to use [employer] ambient listening note generation during this visit Yes    Subjective:   Yoselin Jorge is a 79 y.o. female here today for   Chief Complaint   Patient presents with    Medication Follow-up    Requesting Labs     History of Present Illness  The patient is a 79-year-old female who presents today with concerns regarding insomnia and difficulty sleeping.    She has been experiencing sleep disturbances, averaging only 3 to 4 hours of sleep per night. She has been off amitriptyline for approximately 2 to 3 weeks and is seeking a new prescription for this medication.    She also reports persistent leg cramps. She recently underwent extensive blood work at [facility] and is uncertain if the results have been received by her neurologist.    She has been diagnosed with seizures, the cause of which remains undetermined. She is currently on Keppra for seizure management and has been informed that she will need to continue this medication indefinitely. An EEG has been scheduled, but she has not yet received notification regarding the date of the procedure.    She expresses concern about her blood pressure, which she reports was elevated during her seizure episodes. She does not monitor her blood pressure at home.    She has been diagnosed with osteoporosis and was scheduled for an infusion, but her insurance declined coverage due to a coding error where the diagnosis was entered as osteoporosis instead of osteopenia. She has not received any further communication regarding this issue.    She reports experiencing significant shoulder tension and pain. She is unable to take Aleve due to difficulty swallowing large tablets.    Supplemental Information  She has Carmichael's esophagus.    MEDICATIONS  Current: Keppra, pantoprazole  Discontinued: amitriptyline      No Known Allergies      Current medicines (including changes today)  Current Outpatient Medications  "  Medication Sig Dispense Refill    levETIRAcetam (KEPPRA) 100 MG/ML Solution Take 5 mL by mouth every 12 hours for 189 days. 473 mL 3    pantoprazole (PROTONIX) 20 MG tablet Take 1 Tablet by mouth every day. 90 Tablet 3    levothyroxine (SYNTHROID) 100 MCG Tab Take 1 Tablet by mouth every morning on an empty stomach. 100 Tablet 3    atorvastatin (LIPITOR) 10 MG Tab Take 1 Tablet by mouth every day. 100 Tablet 3    amitriptyline (ELAVIL) 25 MG Tab TAKE ONE TABLET BY MOUTH AT BEDTIME AS NEEDED 100 Tablet 3     No current facility-administered medications for this visit.     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    ROS   ROS  -See HPI     Objective:     Physical Exam:  /64   Pulse 62   Temp 36.6 °C (97.8 °F) (Temporal)   Resp 16   Ht 1.6 m (5' 2.99\")   Wt 71.7 kg (158 lb)   SpO2 98%  Body mass index is 28 kg/m².   Constitutional: Alert, no distress, well-groomed.  Skin: No rashes in visible areas.  Eye: Round. Conjunctiva clear, lids normal. No icterus.   ENMT: Lips pink without lesions, good dentition, moist mucous membranes. Phonation normal.  Neck: No masses, no thyromegaly. Moves freely without pain.  Respiratory: Unlabored respiratory effort, no cough or audible " wheeze  Psych: Alert and oriented x3, normal affect and mood.     Results  Laboratory Studies  Folic acid level is above 3. Vitamin D level is 35.    Assessment and Plan:     Assessment & Plan  1. Insomnia.  She reports averaging 3 to 4 hours of sleep per night and has been off amitriptyline for 2 to 3 weeks. A prescription for amitriptyline 25 has been refilled. She is advised to resume this medication to help with sleep.    2. Restless legs syndrome.  Possible diagnosis.  She reports experiencing leg cramps. Amitriptyline may help alleviate these symptoms. Additionally, she is advised to take a magnesium supplement at night, following the instructions on the bottle.    3. Seizure disorder.  She is currently on Keppra (levetiracetam) indefinitely. She is advised to continue this medication. An EEG has been scheduled for home monitoring over 24 hours. She should follow up with neurology for further management.    4. Osteoporosis.  The diagnosis has been updated, and the insurance should now approve the Prolia injection. She is advised to contact the infusion center for scheduling.    5. Shoulder pain.  She is advised to take children's liquid ibuprofen or Tylenol, up to 400 mg of ibuprofen at once, to manage her shoulder pain.    6. Blood pressure management.  Her blood pressure has been stable over the past month. She is advised to monitor her blood pressure at home, aiming to keep it below 140/80.    Orders:  1. Primary insomnia  - amitriptyline (ELAVIL) 25 MG Tab; TAKE ONE TABLET BY MOUTH AT BEDTIME AS NEEDED  Dispense: 100 Tablet; Refill: 3    2. Age-related osteoporosis without current pathological fracture  - Referral to Outpatient Infusion    3. Restless leg    4. Chronic left shoulder pain    5. Elevated blood pressure, situational        Followup: No follow-ups on file.         PLEASE NOTE: This dictation was created using voice recognition and [employer] ambient listening software. I have made every  reasonable attempt to correct obvious errors, but I expect that there are errors of grammar and possibly content that I did not discover before finalizing the note.

## 2024-12-14 LAB — LEVETIRACETAM SERPL-MCNC: 7 UG/ML (ref 10–40)

## 2024-12-18 ENCOUNTER — PATIENT MESSAGE (OUTPATIENT)
Dept: NEUROLOGY | Facility: MEDICAL CENTER | Age: 79
End: 2024-12-18
Payer: MEDICARE

## 2024-12-18 DIAGNOSIS — R56.9 SEIZURE (HCC): ICD-10-CM

## 2024-12-20 NOTE — PROGRESS NOTES
Keppra level low - advised patient we can increase dose to 750 mg BID or wait for ambulatory EEG results, provided she is not having events concerning for seizures.     Vitamin B12 and vitamin D levels were low end of normal, advised low dose supplementation.     Ordering ambulatory 24h EEG.

## 2025-01-09 ENCOUNTER — NON-PROVIDER VISIT (OUTPATIENT)
Dept: NEUROLOGY | Facility: MEDICAL CENTER | Age: 80
End: 2025-01-09
Attending: STUDENT IN AN ORGANIZED HEALTH CARE EDUCATION/TRAINING PROGRAM
Payer: MEDICARE

## 2025-01-09 DIAGNOSIS — R56.9 SEIZURE (HCC): ICD-10-CM

## 2025-01-09 PROCEDURE — 95708 EEG WO VID EA 12-26HR UNMNTR: CPT | Performed by: STUDENT IN AN ORGANIZED HEALTH CARE EDUCATION/TRAINING PROGRAM

## 2025-01-09 PROCEDURE — 95700 EEG CONT REC W/VID EEG TECH: CPT | Performed by: STUDENT IN AN ORGANIZED HEALTH CARE EDUCATION/TRAINING PROGRAM

## 2025-01-09 PROCEDURE — 95719 EEG PHYS/QHP EA INCR W/O VID: CPT | Performed by: STUDENT IN AN ORGANIZED HEALTH CARE EDUCATION/TRAINING PROGRAM

## 2025-01-09 NOTE — PROCEDURES
AMBULATORY ELECTROENCEPHALOGRAM REPORT      REFERRING PROVIDER:  Debra León M.D.  [address] [address]  [place] [id number]  DOS: 01/09/2025   DURATION OF RECORDING: (22 hours and 58 minutes)    INDICATION:  Yoselin Jorge 79 y.o. female presenting with  seizure(s)    CURRENT OUTPATIENT MEDICATION LIST:   Current Outpatient Medications   Medication Instructions    amitriptyline (ELAVIL) 25 MG Tab TAKE ONE TABLET BY MOUTH AT BEDTIME AS NEEDED    atorvastatin (LIPITOR) 10 mg, Oral, DAILY    levETIRAcetam (KEPPRA) 500 mg, Oral, EVERY 12 HOURS    levothyroxine (SYNTHROID) 100 mcg, Oral, EACH MORNING ON EMPTY STOMACH    metronidazole (METROGEL) 1 % gel     pantoprazole (PROTONIX) 20 mg, Oral, DAILY       TECHNIQUE:   The EEG was set up and taken down by a Neurodiagnostic technologist who performed education to patient and staff.  A minimum but not limited to 23 electrodes and 23 channel recording was setup and performed by Neurodiagnostic technologist. Impedances, electrode integrity, and technical impressions were documented a minimum of every 2-24 hour period by a Neurodiagnostic Technologist and reviewed by Interpreting Physician.    DESCRIPTION OF THE RECORD:  During maximal wakefulness, the background was continuous and showed a 10.5 Hz posterior dominant rhythm.  Reactivity and state changes were present.  During drowsiness, theta/delta frequencies were seen.    Sleep was captured and was characterized by diffuse background delta/theta activity with a loss of myogenic artifact.  N2 sleep transients in the form of sleep spindles and vertex waves were seen in the leads over the central regions.     ICTAL AND INTERICTAL FINDINGS:   Rare bursts of left temporal maximal polymorphic mild slowing  No focal or generalized epileptiform activity noted.   No seizures.    ACTIVATION PROCEDURES:   Intermittent Photic stimulation was performed in a stepwise fashion from 1 to 30 Hz and did not elicited any  abnormalities on EEG.     EKG: Sampling of the EKG recording showed sinus rhythm    EVENTS:  Push button events and/or ambulatory diary events: no intentional push button events    INTERPRETATION:  23 hour abnormal ambulatory EEG recording in the awake and drowsy/sleep state(s):  -Rare bursts of left temporal maximal polymorphic mild slowing. This finding is suggestive of focal cerebral dysfunction of a nonspecific etiology, which may be seen in the setting of a structural abnormality, postictally, or in other clinical scenarios. Clinical and radiologic correlation recommended.   -No definitive epileptiform discharges or other epileptiform phenomena seen.  -No seizures.   -Clinical Events: none        Debra León M.D.   Diplomate, Neurology with Special Qualification in Epilepsy, American Board of Psychiatry and Neurology   of Clinical Neurology, [facility]  Level III Epilepsy [facility], Department of Neurology at [facility]

## 2025-01-10 ENCOUNTER — NON-PROVIDER VISIT (OUTPATIENT)
Dept: NEUROLOGY | Facility: MEDICAL CENTER | Age: 80
End: 2025-01-10
Attending: STUDENT IN AN ORGANIZED HEALTH CARE EDUCATION/TRAINING PROGRAM
Payer: MEDICARE

## 2025-01-10 PROCEDURE — 99999 PR NO CHARGE: CPT | Performed by: STUDENT IN AN ORGANIZED HEALTH CARE EDUCATION/TRAINING PROGRAM

## 2025-01-22 PROBLEM — M19.019 ACROMIOCLAVICULAR ARTHROSIS: Status: ACTIVE | Noted: 2025-01-22

## 2025-01-22 PROBLEM — S43.432A SUPERIOR GLENOID LABRUM LESION OF LEFT SHOULDER: Status: ACTIVE | Noted: 2025-01-22

## 2025-01-22 PROBLEM — M66.321 NONTRAUMATIC RUPTURE OF RIGHT PROXIMAL BICEPS TENDON: Status: ACTIVE | Noted: 2025-01-22

## 2025-01-22 PROBLEM — M75.100 ROTATOR CUFF TEAR: Status: ACTIVE | Noted: 2025-01-22

## 2025-01-22 PROBLEM — M75.50 SUBACROMIAL BURSITIS: Status: ACTIVE | Noted: 2025-01-22

## 2025-01-22 ASSESSMENT — FIBROSIS 4 INDEX: FIB4 SCORE: 1.66

## 2025-01-22 NOTE — H&P (VIEW-ONLY)
Subjective   Patient ID:  Yoselin Jorge is a 79 y.o. female.    Chief Complaint:  Results of the Left Shoulder    HPI:    The patient returns in follow-up of her left shoulder.  She recently got an MRI.  She continues to have very significant pain in the shoulder.  The pain began when she fell apparently after having a seizure.  She was taken the emergency department and then had another witnessed seizure.  She has been on antiseizure medications since then and has not had any additional seizures.  She had an EEG, but has not yet had follow-up with the neurologist.    Review of Systems    Past Medical History:   Diagnosis Date    Exudative age-related macular degeneration of left eye with active choroidal neovascularization (HCC) 7/27/2020    Menopause 7/27/2020    Epigastric abdominal pain of unknown etiology 7/12/2019    Rib pain on right side 7/31/2018    Prediabetes 5/11/2017    S/P thyroidectomy 5/22/2015    S/P dilatation of esophageal stricture 5/16/2015    EGD 2015.    Shoulder pain 2014    Flat foot(734) 3/17/2013    Impaired fasting glucose 3/17/2013    SENSORINEURAL HEARING LOSS 3/17/2013    Hyperlipidemia 3/11/2013    History of nasopharyngeal cancer 3/11/2013    Family history of melanoma 3/11/2013    Other lymphedema 3/11/2013    Cancer (HCC) 2007    nasopharangeal     Heart burn     Herniated cervical disc     L4- L5 right side-Dr. Weinstein    Indigestion     Unspecified cataract     bilater. IOL    Unspecified urinary incontinence         has a current medication list which includes the following prescription(s): metronidazole, amitriptyline, levetiracetam, pantoprazole, levothyroxine, and atorvastatin.     Past Surgical History:   Procedure Laterality Date    VITRECTOMY POSTERIOR Left 12/17/2019    Procedure: REMOVAL, VITREOUS BODY, POSTERIOR PORTION-MEMBRANE PEEL POSSIBLE LASER GAS OR OIL;  Surgeon: Arlyn Dawson M.D.;  Location: SURGERY SAME DAY [facility];  Service: Ophthalmology     THYROIDECTOMY TOTAL  11/19/2014    Performed by Lukas De Santiago M.D. at SURGERY SAME DAY [facility]    BICEPS TENODESIS  5/22/2014    Performed by Cain Gerardo M.D. at SURGERY [facility]    SHOULDER ARTHROSCOPY W/ ROTATOR CUFF REPAIR  5/22/2014    Performed by Cain Gerardo M.D. at SURGERY [facility]    SHOULDER DECOMPRESSION ARTHROSCOPIC  5/22/2014    Performed by Cain Gerardo M.D. at SURGERY [facility]    CLAVICLE DISTAL EXCISION  5/22/2014    Performed by Cain Gerardo M.D. at SURGERY [facility]    TENDON RELEASE FOOT  August 2013    [facility]     OTHER ORTHOPEDIC SURGERY  2006    reconstruction  left foot    BUNIONECTOMY  1988    CHOLECYSTECTOMY  1988    TUBAL LIGATION  1982    CATARACT PHACO WITH IOL      Ry    GYN SURGERY      tubal    TONSILLECTOMY      US-NEEDLE CORE BX-BREAST PANEL          Physical Examination:    She has full passive range of motion of the shoulder.  She has positive Neer and Lam signs.  She has tenderness over the AC joint.  She has pain with cross body adduction.  She has pain and weakness with rotator cuff isolation.    Imaging:    Previous x-rays were reviewed.  She has well-preserved glenohumeral joint.  She has moderate AC joint arthrosis, type III acromion and a large subacromial spur.    MRI images were reviewed.  She has a full-thickness tear of the supraspinatus without significant atrophy.  She has splitting of the proximal biceps.  She has a large subacromial spur and AC joint arthrosis.  She has some labral pathology.    Assessment:    Left shoulder rotator cuff tear, proximal biceps pathology, subacromial impingement and AC joint arthrosis    Plan:    We discussed the operative and nonoperative treatment options.  I would recommend left shoulder arthroscopic rotator cuff repair, biceps tenodesis, decompression, distal clavicle resection and joint debridement. The risks, benefits, alternatives and potential complications were discussed with  the patient. They desire to proceed with the recommended intervention.    Return if symptoms worsen or fail to improve.    Please note that this dictation was created using voice recognition software. I have made every reasonable attempt to correct obvious errors, but I expect that there are errors of grammar and possibly content that I did not discover before finalizing the note.

## 2025-01-29 ENCOUNTER — OFFICE VISIT (OUTPATIENT)
Dept: NEUROLOGY | Facility: MEDICAL CENTER | Age: 80
End: 2025-01-29
Attending: STUDENT IN AN ORGANIZED HEALTH CARE EDUCATION/TRAINING PROGRAM
Payer: MEDICARE

## 2025-01-29 ENCOUNTER — TELEPHONE (OUTPATIENT)
Dept: PHARMACY | Facility: MEDICAL CENTER | Age: 80
End: 2025-01-29

## 2025-01-29 VITALS
SYSTOLIC BLOOD PRESSURE: 138 MMHG | WEIGHT: 158.95 LBS | RESPIRATION RATE: 17 BRPM | DIASTOLIC BLOOD PRESSURE: 56 MMHG | HEART RATE: 59 BPM | HEIGHT: 63 IN | BODY MASS INDEX: 28.16 KG/M2 | OXYGEN SATURATION: 97 %

## 2025-01-29 DIAGNOSIS — R56.9 SEIZURE (HCC): ICD-10-CM

## 2025-01-29 PROCEDURE — 99211 OFF/OP EST MAY X REQ PHY/QHP: CPT | Performed by: STUDENT IN AN ORGANIZED HEALTH CARE EDUCATION/TRAINING PROGRAM

## 2025-01-29 RX ORDER — LEVETIRACETAM 100 MG/ML
750 SOLUTION ORAL EVERY 12 HOURS
Qty: 1350 ML | Refills: 1 | Status: SHIPPED | OUTPATIENT
Start: 2025-01-29 | End: 2025-07-28

## 2025-01-29 ASSESSMENT — FIBROSIS 4 INDEX: FIB4 SCORE: 1.66

## 2025-01-29 ASSESSMENT — PATIENT HEALTH QUESTIONNAIRE - PHQ9
CLINICAL INTERPRETATION OF PHQ2 SCORE: 1
5. POOR APPETITE OR OVEREATING: 0 - NOT AT ALL
SUM OF ALL RESPONSES TO PHQ QUESTIONS 1-9: 6

## 2025-01-29 NOTE — PROGRESS NOTES
"[facility]  Follow up visit    Patient name: Yoselin Jorge  YOB: 1945  MRN: [id number]  Date of visit: 1/29/2025         Background:  Yoselin Jorge is a 79 y.o. woman being seen in follow up for seizures.        Details from initial visit 12/10/2024  The events of concern occurred on 11/24/2024.     She was found at home on the floor by her  in their bedroom. She had been cleaning the bathroom. She seemed to have gone back into the bedroom and fell onto carpeted area. When her  went to her, she smiled at him and told him she took a nap on the floor. He helped her to sit up and she sat for a few minutes, then he helped her up and onto a chair. He told her they need to go to the ED to figure out why she fell. He was not with her immediately after the event, is unsure how long she was down for. She bit the bottom of her lip, no tongue biting. No urinary incontinence.      They went to the ED at [facility]. She then had a witnessed seizure in the ED. Her  notes that she had full body convulsions with wide eyes, mouth was open. She was given medication to abort the seizure, stopped after a couple of minutes. She was taken to a room and appeared confused afterwards, was sluggish and slow to answer questions.     She has no recollection of either episode.      She had an MRI and an EEG which were both unremarkable. She does not remember having the EEG done.     She has been taking Keppra 500 mg twice daily.     She has not had any staring spells, abnormal sensation, confusional episodes, epigastric rising, or other transient symptoms that raise concern for seizures.      Since the seizures, feels \"foggy\" mentally and aches all over.      Her blood pressures were very labile in the hospital. She reports taht her BP went up to 200 and there is a nursing note indicating that she had a BP of 81/39 at one point during her hospitalization.      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 She was PEG-dependent for nearly a year.      Reports having more stress than usual this year. This year she has had a lot of dental work. She recently underwent yearly esophageal dilation. She is unable to swallow big tablets, anything bigger than a baby aspirin.     She has nighttime leg cramping. This has been intermittent for 2 years. It can start at the hip down to the toes. This wakes her up 2-3 times per night. She uses a topical cream to help this.      She has not been driving.      She has felt off balance since having the seizures. No falls. She missed a bottom step about a year ago and had a lower back compression fracture. No issues since then.       Interval history:  She is accompanied to clinic by her .     No interval seizures.     Ambulatory EEG showed left temporal intermittent slowing, no seizure activity.    Leg cramping intermittently in the last 3 years is a problem, always between 2-3 AM in the morning. Did not happen the night she had the EEG. [place]     Mood: slightly more irritable than prior to starting Keppra      1/29/2025     3:40 PM 8/14/2024    10:20 AM 7/5/2024     8:40 AM 8/7/2023     9:45 AM 1/17/2022    10:00 AM   PHQ-9 Screening   Little interest or pleasure in doing things 1 - several days 0 - not at all 0 - not at all 0 - not at all 0 - not at all   Feeling down, depressed, or hopeless 0 - not at all 0 - not at all 0 - not at all 0 - not at all 0 - not at all   Trouble falling or staying asleep, or sleeping too much 3 - nearly every day       Feeling tired or having little energy 1 - several days       Poor appetite or overeating 0 - not at all       Feeling bad about yourself - or that you are a failure or have let yourself or your family down 0 - not at all       Trouble concentrating on things, such as reading the newspaper or watching television 1 - several  days       Moving or speaking so slowly that other people could have noticed. Or the opposite - being so fidgety or restless that you have been moving around a lot more than usual 0 - not at all       Thoughts that you would be better off dead, or of hurting yourself in some way 0 - not at all       PHQ-2 Total Score 1 0 0 0 0   PHQ-9 Total Score 6               Current Medications:   Current Outpatient Medications:     pyridoxine (vitamin B6) 10 mg/mL oral solution, Take 10 mL by mouth every day for 180 days. Shake well, refrigerate, Disp: 900 mL, Rfl: 1    levETIRAcetam (KEPPRA) 100 MG/ML Solution, Take 7.5 mL by mouth every 12 hours for 180 days., Disp: 1350 mL, Rfl: 1    pantoprazole (PROTONIX) 20 MG tablet, Take 1 Tablet by mouth every day., Disp: 90 Tablet, Rfl: 3    levothyroxine (SYNTHROID) 100 MCG Tab, Take 1 Tablet by mouth every morning on an empty stomach., Disp: 100 Tablet, Rfl: 3    atorvastatin (LIPITOR) 10 MG Tab, Take 1 Tablet by mouth every day., Disp: 100 Tablet, Rfl: 3    Allergies: No Known Allergies      Physical Exam:   Ambulatory Vitals  Vitals:    01/29/25 1518   BP: 138/56   Pulse: (!) 59   Resp: 17   SpO2: 97%     Constitutional: Well-developed, well-nourished, good hygiene. Appears stated age.  Respiratory: normal respiratory effort  Skin: Warm, dry, intact. No rashes observed.  Neurologic:   Mental Status: Awake, alert, oriented x 4.   Speech: Fluent with normal prosody.   Memory: Able to recall recent and remote events accurately.    Concentration: Attentive. Able to focus on history.   Fund of Knowledge: Appropriate.   Cranial Nerves:    CN II: PERRL    CN III, IV, VI: EOMI without nystagmus    CN VII: No facial asymmetry    CN VIII: Hearing intact to voice   Motor: moving all extremities fully and equally    Gait: ambulates steadily without assistive device   Movements: No resting tremors or abnormal movements observed.       Studies:      Labs reviewed      Imaging:     EEG Results:    Ambulatory EEG 1/9/2025:  INTERPRETATION:  23 hour abnormal ambulatory EEG recording in the awake and drowsy/sleep state(s):  -Rare bursts of left temporal maximal polymorphic mild slowing. This finding is suggestive of focal cerebral dysfunction of a nonspecific etiology, which may be seen in the setting of a structural abnormality, postictally, or in other clinical scenarios. Clinical and radiologic correlation recommended.   -No definitive epileptiform discharges or other epileptiform phenomena seen.  -No seizures.   -Clinical Events: none          Assessment/Plan:   Yoselin Jorge is a 79 y.o. woman with a history of two lifetime seizures in < 24 hour period who is being seen in follow-up after her hospital visit for seizure.    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  It is possible that cortical indentation from the frontal bone could be a nidus for seizure activity.     24h ambulatory EEG showed rare bursts of L temporal slowing which could be seen in the setting of a focal epilepsy but is not epileptic in and of itself.     Discussed options of the medications. She could start Lamictal uptitration, or we could continue Keppra with pyridoxine supplementation. Her Keppra level was slightly low; increase to 750 mg BID. If irritability is an issue at the higher dose with pyridoxine, we can discuss cross-titration to Lamictal.     For leg cramping at night, checked ferritin level - normal. I am considering if this could be related to occult seizure activity. Advised patient to monitor symptoms on the higher dose of Keppra. Importantly, she did not experience this the night she had the ambulatory EEG.     At initial visit recommended vitamin D 2000 to 5000 international units daily for bone health with concomitant antiseizure medication use. The patient is aware to contact me if she has any events concerning for  breakthrough seizures.  Discussed typical symptoms of seizure to be aware of. Counseling was provided at initial visit regarding seizure safety, risk factors for breakthrough seizures including poor sleep, stress, being ill with a fever, drugs or alcohol.  She does not use drugs or drink alcohol.       She is not driving.  She is aware of the [place] state law of no driving until seizure-free for at least 3 months     Exam is notable for distal impaired vibratory sensation; B12 and folate levels ordered.  She also has glucose monitoring through her primary care. B12 was low normal - consider supplementation, discuss further with PCP and/or at next visit.          Follow up in 4-6 weeks (already scheduled 3/10)        Debra León M.D.   Diplomate, Neurology with Special Qualification in Epilepsy, American Board of Psychiatry and Neurology   of Clinical Neurology, [facility]      During today's encounter we discussed available treatment options and their individual side effect profiles. Total encounter time caring for patient today 46 minutes.

## 2025-01-29 NOTE — TELEPHONE ENCOUNTER
Received [employer] request via [employer]  for   levETIRAcetam (KEPPRA) 100 MG/ML Solution. (Quantity:1350, Day Supply:90)     Insurance: [employer]  Member ID: [id number]  BIN: [id number]  PCN: [id number]  Group: [facility]     Ran Test claim via [place] & medication Pays for a $0 copay. Will outreach to patient to offer specialty pharmacy services and or release to preferred pharmacy    Will release to pt's preferred pharmacy on file.

## 2025-01-30 ENCOUNTER — HOSPITAL ENCOUNTER (OUTPATIENT)
Dept: RADIOLOGY | Facility: MEDICAL CENTER | Age: 80
End: 2025-01-30
Attending: OTOLARYNGOLOGY
Payer: MEDICARE

## 2025-01-30 DIAGNOSIS — H61.22 IMPACTED CERUMEN OF LEFT EAR: ICD-10-CM

## 2025-01-30 PROCEDURE — 70480 CT ORBIT/EAR/FOSSA W/O DYE: CPT

## 2025-02-07 ENCOUNTER — APPOINTMENT (OUTPATIENT)
Dept: ADMISSIONS | Facility: MEDICAL CENTER | Age: 80
End: 2025-02-07
Attending: ORTHOPAEDIC SURGERY
Payer: MEDICARE

## 2025-02-10 ENCOUNTER — APPOINTMENT (OUTPATIENT)
Dept: ADMISSIONS | Facility: MEDICAL CENTER | Age: 80
End: 2025-02-10
Attending: ORTHOPAEDIC SURGERY
Payer: MEDICARE

## 2025-02-10 ENCOUNTER — ANESTHESIA EVENT (OUTPATIENT)
Dept: SURGERY | Facility: MEDICAL CENTER | Age: 80
End: 2025-02-10
Payer: MEDICARE

## 2025-02-10 VITALS — HEIGHT: 63 IN | BODY MASS INDEX: 28.16 KG/M2

## 2025-02-10 DIAGNOSIS — Z01.812 PRE-OPERATIVE LABORATORY EXAMINATION: ICD-10-CM

## 2025-02-10 LAB
EST. AVERAGE GLUCOSE BLD GHB EST-MCNC: 120 MG/DL
HBA1C MFR BLD: 5.8 % (ref 4–5.6)

## 2025-02-10 PROCEDURE — 36415 COLL VENOUS BLD VENIPUNCTURE: CPT

## 2025-02-10 PROCEDURE — 80048 BASIC METABOLIC PNL TOTAL CA: CPT

## 2025-02-10 PROCEDURE — 83036 HEMOGLOBIN GLYCOSYLATED A1C: CPT

## 2025-02-10 NOTE — PREPROCEDURE INSTRUCTIONS
PreAdmit Telephone Appointment: Reviewed the Preparing for your procedure handout with patient over the phone. Patient instructed per pharmacy guidelines regarding taking, holding or contacting provider for instructions on regularly prescribed medications before surgery. Instructed to take the following medications the day of surgery with a sip of water per pharmacy medication guidelines: Protonix, Synthroid, Lipitor, Keppra    Confirmed with patient where to check in morning of surgery. AVS sent to patient's My Chart.

## 2025-02-10 NOTE — PREADMIT AVS NOTE
Current Medications   Medication Instructions    Multiple Vitamins-Minerals (PRESERVISION AREDS PO) Stop 7 days before surgery    pyridoxine (vitamin B6) 10 mg/mL oral solution Stop 7 days before surgery    levETIRAcetam (KEPPRA) 100 MG/ML Solution Continue taking medication as prescribed, including morning of procedure     pantoprazole (PROTONIX) 20 MG tablet Continue taking medication as prescribed, including morning of procedure     levothyroxine (SYNTHROID) 100 MCG Tab Continue taking medication as prescribed, including morning of procedure     atorvastatin (LIPITOR) 10 MG Tab Continue taking medication as prescribed, including morning of procedure

## 2025-02-11 ENCOUNTER — HOSPITAL ENCOUNTER (OUTPATIENT)
Facility: MEDICAL CENTER | Age: 80
End: 2025-02-12
Attending: ORTHOPAEDIC SURGERY | Admitting: ORTHOPAEDIC SURGERY
Payer: MEDICARE

## 2025-02-11 ENCOUNTER — ANESTHESIA (OUTPATIENT)
Dept: SURGERY | Facility: MEDICAL CENTER | Age: 80
End: 2025-02-11
Payer: MEDICARE

## 2025-02-11 DIAGNOSIS — M75.122 NONTRAUMATIC COMPLETE TEAR OF LEFT ROTATOR CUFF: ICD-10-CM

## 2025-02-11 DIAGNOSIS — G89.18 POSTOPERATIVE PAIN: ICD-10-CM

## 2025-02-11 DIAGNOSIS — S43.432A SUPERIOR GLENOID LABRUM LESION OF LEFT SHOULDER, INITIAL ENCOUNTER: ICD-10-CM

## 2025-02-11 LAB
ANION GAP SERPL CALC-SCNC: 8 MMOL/L (ref 7–16)
BUN SERPL-MCNC: 8 MG/DL (ref 8–22)
CALCIUM SERPL-MCNC: 9.2 MG/DL (ref 8.5–10.5)
CHLORIDE SERPL-SCNC: 104 MMOL/L (ref 96–112)
CO2 SERPL-SCNC: 26 MMOL/L (ref 20–33)
CREAT SERPL-MCNC: 0.74 MG/DL (ref 0.5–1.4)
GFR SERPLBLD CREATININE-BSD FMLA CKD-EPI: 82 ML/MIN/1.73 M 2
GLUCOSE SERPL-MCNC: 87 MG/DL (ref 65–99)
POTASSIUM SERPL-SCNC: 3.5 MMOL/L (ref 3.6–5.5)
SODIUM SERPL-SCNC: 138 MMOL/L (ref 135–145)

## 2025-02-11 PROCEDURE — 770030 HCHG ROOM/CARE - EXTENDED RECOVERY EACH 15 MIN

## 2025-02-11 PROCEDURE — 29824 SHO ARTHRS SRG DSTL CLAVICLC: CPT | Mod: LT | Performed by: ORTHOPAEDIC SURGERY

## 2025-02-11 PROCEDURE — 700111 HCHG RX REV CODE 636 W/ 250 OVERRIDE (IP): Performed by: ORTHOPAEDIC SURGERY

## 2025-02-11 PROCEDURE — 29827 SHO ARTHRS SRG RT8TR CUF RPR: CPT | Mod: LT | Performed by: ORTHOPAEDIC SURGERY

## 2025-02-11 PROCEDURE — 160009 HCHG ANES TIME/MIN: Performed by: ORTHOPAEDIC SURGERY

## 2025-02-11 PROCEDURE — 160036 HCHG PACU - EA ADDL 30 MINS PHASE I: Performed by: ORTHOPAEDIC SURGERY

## 2025-02-11 PROCEDURE — 700101 HCHG RX REV CODE 250: Performed by: ORTHOPAEDIC SURGERY

## 2025-02-11 PROCEDURE — 160015 HCHG STAT PREOP MINUTES: Performed by: ORTHOPAEDIC SURGERY

## 2025-02-11 PROCEDURE — 160002 HCHG RECOVERY MINUTES (STAT): Performed by: ORTHOPAEDIC SURGERY

## 2025-02-11 PROCEDURE — 29824 SHO ARTHRS SRG DSTL CLAVICLC: CPT | Mod: ASROC,LT | Performed by: PHYSICIAN ASSISTANT

## 2025-02-11 PROCEDURE — 29823 SHO ARTHRS SRG XTNSV DBRDMT: CPT | Mod: LT | Performed by: ORTHOPAEDIC SURGERY

## 2025-02-11 PROCEDURE — 502000 HCHG MISC OR IMPLANTS RC 0278: Performed by: ORTHOPAEDIC SURGERY

## 2025-02-11 PROCEDURE — 29826 SHO ARTHRS SRG DECOMPRESSION: CPT | Mod: LT | Performed by: ORTHOPAEDIC SURGERY

## 2025-02-11 PROCEDURE — 700101 HCHG RX REV CODE 250: Performed by: ANESTHESIOLOGY

## 2025-02-11 PROCEDURE — 160041 HCHG SURGERY MINUTES - EA ADDL 1 MIN LEVEL 4: Performed by: ORTHOPAEDIC SURGERY

## 2025-02-11 PROCEDURE — 160035 HCHG PACU - 1ST 60 MINS PHASE I: Performed by: ORTHOPAEDIC SURGERY

## 2025-02-11 PROCEDURE — 29826 SHO ARTHRS SRG DECOMPRESSION: CPT | Mod: ASROC,LT | Performed by: PHYSICIAN ASSISTANT

## 2025-02-11 PROCEDURE — 700111 HCHG RX REV CODE 636 W/ 250 OVERRIDE (IP): Mod: JZ | Performed by: ORTHOPAEDIC SURGERY

## 2025-02-11 PROCEDURE — 94760 N-INVAS EAR/PLS OXIMETRY 1: CPT

## 2025-02-11 PROCEDURE — 700111 HCHG RX REV CODE 636 W/ 250 OVERRIDE (IP): Mod: JZ | Performed by: ANESTHESIOLOGY

## 2025-02-11 PROCEDURE — 29823 SHO ARTHRS SRG XTNSV DBRDMT: CPT | Mod: ASROC,LT | Performed by: PHYSICIAN ASSISTANT

## 2025-02-11 PROCEDURE — 29828 SHO ARTHRS SRG BICP TENODSIS: CPT | Mod: ASROC,LT | Performed by: PHYSICIAN ASSISTANT

## 2025-02-11 PROCEDURE — 700111 HCHG RX REV CODE 636 W/ 250 OVERRIDE (IP): Performed by: ANESTHESIOLOGY

## 2025-02-11 PROCEDURE — 700105 HCHG RX REV CODE 258: Performed by: ORTHOPAEDIC SURGERY

## 2025-02-11 PROCEDURE — 160029 HCHG SURGERY MINUTES - 1ST 30 MINS LEVEL 4: Performed by: ORTHOPAEDIC SURGERY

## 2025-02-11 PROCEDURE — 64415 NJX AA&/STRD BRCH PLXS IMG: CPT | Performed by: ORTHOPAEDIC SURGERY

## 2025-02-11 PROCEDURE — 29828 SHO ARTHRS SRG BICP TENODSIS: CPT | Mod: LT | Performed by: ORTHOPAEDIC SURGERY

## 2025-02-11 PROCEDURE — 700102 HCHG RX REV CODE 250 W/ 637 OVERRIDE(OP): Performed by: STUDENT IN AN ORGANIZED HEALTH CARE EDUCATION/TRAINING PROGRAM

## 2025-02-11 PROCEDURE — A9270 NON-COVERED ITEM OR SERVICE: HCPCS | Performed by: STUDENT IN AN ORGANIZED HEALTH CARE EDUCATION/TRAINING PROGRAM

## 2025-02-11 PROCEDURE — 160048 HCHG OR STATISTICAL LEVEL 1-5: Performed by: ORTHOPAEDIC SURGERY

## 2025-02-11 PROCEDURE — 29827 SHO ARTHRS SRG RT8TR CUF RPR: CPT | Mod: ASROC,LT | Performed by: PHYSICIAN ASSISTANT

## 2025-02-11 PROCEDURE — C1713 ANCHOR/SCREW BN/BN,TIS/BN: HCPCS | Performed by: ORTHOPAEDIC SURGERY

## 2025-02-11 DEVICE — ANCHOR ICONIX 1-#2 FORCEFIBER 1.4MM (5EA/BX): Type: IMPLANTABLE DEVICE | Site: SHOULDER | Status: FUNCTIONAL

## 2025-02-11 DEVICE — IMPLANTABLE DEVICE: Type: IMPLANTABLE DEVICE | Site: SHOULDER | Status: FUNCTIONAL

## 2025-02-11 RX ORDER — EPHEDRINE SULFATE 50 MG/ML
5 INJECTION, SOLUTION INTRAVENOUS
Status: DISCONTINUED | OUTPATIENT
Start: 2025-02-11 | End: 2025-02-11 | Stop reason: HOSPADM

## 2025-02-11 RX ORDER — HYDRALAZINE HYDROCHLORIDE 20 MG/ML
INJECTION INTRAMUSCULAR; INTRAVENOUS PRN
Status: DISCONTINUED | OUTPATIENT
Start: 2025-02-11 | End: 2025-02-11 | Stop reason: SURG

## 2025-02-11 RX ORDER — DIPHENHYDRAMINE HYDROCHLORIDE 50 MG/ML
12.5 INJECTION INTRAMUSCULAR; INTRAVENOUS
Status: DISCONTINUED | OUTPATIENT
Start: 2025-02-11 | End: 2025-02-11 | Stop reason: HOSPADM

## 2025-02-11 RX ORDER — DEXAMETHASONE SODIUM PHOSPHATE 4 MG/ML
INJECTION, SOLUTION INTRA-ARTICULAR; INTRALESIONAL; INTRAMUSCULAR; INTRAVENOUS; SOFT TISSUE PRN
Status: DISCONTINUED | OUTPATIENT
Start: 2025-02-11 | End: 2025-02-11 | Stop reason: SURG

## 2025-02-11 RX ORDER — ALBUTEROL SULFATE 5 MG/ML
2.5 SOLUTION RESPIRATORY (INHALATION)
Status: DISCONTINUED | OUTPATIENT
Start: 2025-02-11 | End: 2025-02-11 | Stop reason: HOSPADM

## 2025-02-11 RX ORDER — SODIUM CHLORIDE, SODIUM LACTATE, POTASSIUM CHLORIDE, CALCIUM CHLORIDE 600; 310; 30; 20 MG/100ML; MG/100ML; MG/100ML; MG/100ML
INJECTION, SOLUTION INTRAVENOUS CONTINUOUS
Status: DISCONTINUED | OUTPATIENT
Start: 2025-02-11 | End: 2025-02-11 | Stop reason: HOSPADM

## 2025-02-11 RX ORDER — OXYCODONE HYDROCHLORIDE 5 MG/1
5 TABLET ORAL EVERY 4 HOURS PRN
Qty: 30 TABLET | Refills: 0 | Status: SHIPPED | OUTPATIENT
Start: 2025-02-11 | End: 2025-02-16

## 2025-02-11 RX ORDER — OXYCODONE HCL 5 MG/5 ML
5 SOLUTION, ORAL ORAL
Status: DISCONTINUED | OUTPATIENT
Start: 2025-02-11 | End: 2025-02-11 | Stop reason: HOSPADM

## 2025-02-11 RX ORDER — BUPIVACAINE HYDROCHLORIDE 2.5 MG/ML
INJECTION, SOLUTION EPIDURAL; INFILTRATION; INTRACAUDAL PRN
Status: DISCONTINUED | OUTPATIENT
Start: 2025-02-11 | End: 2025-02-11 | Stop reason: SURG

## 2025-02-11 RX ORDER — LEVETIRACETAM 500 MG/1
750 TABLET ORAL EVERY 12 HOURS
Status: DISCONTINUED | OUTPATIENT
Start: 2025-02-11 | End: 2025-02-12 | Stop reason: HOSPADM

## 2025-02-11 RX ORDER — SODIUM CHLORIDE, SODIUM LACTATE, POTASSIUM CHLORIDE, CALCIUM CHLORIDE 600; 310; 30; 20 MG/100ML; MG/100ML; MG/100ML; MG/100ML
INJECTION, SOLUTION INTRAVENOUS CONTINUOUS
Status: ACTIVE | OUTPATIENT
Start: 2025-02-11 | End: 2025-02-11

## 2025-02-11 RX ORDER — ACETAMINOPHEN 10 MG/ML
1000 INJECTION, SOLUTION INTRAVENOUS EVERY 8 HOURS
Status: DISCONTINUED | OUTPATIENT
Start: 2025-02-11 | End: 2025-02-12 | Stop reason: HOSPADM

## 2025-02-11 RX ORDER — OXYCODONE HYDROCHLORIDE 5 MG/1
5 TABLET ORAL EVERY 4 HOURS PRN
Status: DISCONTINUED | OUTPATIENT
Start: 2025-02-11 | End: 2025-02-11

## 2025-02-11 RX ORDER — ROCURONIUM BROMIDE 10 MG/ML
INJECTION, SOLUTION INTRAVENOUS PRN
Status: DISCONTINUED | OUTPATIENT
Start: 2025-02-11 | End: 2025-02-11 | Stop reason: SURG

## 2025-02-11 RX ORDER — MEPERIDINE HYDROCHLORIDE 25 MG/ML
12.5 INJECTION INTRAMUSCULAR; INTRAVENOUS; SUBCUTANEOUS
Status: DISCONTINUED | OUTPATIENT
Start: 2025-02-11 | End: 2025-02-11 | Stop reason: HOSPADM

## 2025-02-11 RX ORDER — ONDANSETRON 2 MG/ML
4 INJECTION INTRAMUSCULAR; INTRAVENOUS
Status: DISCONTINUED | OUTPATIENT
Start: 2025-02-11 | End: 2025-02-11 | Stop reason: HOSPADM

## 2025-02-11 RX ORDER — CEFAZOLIN SODIUM 1 G/3ML
2 INJECTION, POWDER, FOR SOLUTION INTRAMUSCULAR; INTRAVENOUS ONCE
Status: COMPLETED | OUTPATIENT
Start: 2025-02-11 | End: 2025-02-11

## 2025-02-11 RX ORDER — OXYCODONE HCL 5 MG/5 ML
5 SOLUTION, ORAL ORAL EVERY 4 HOURS PRN
Status: DISCONTINUED | OUTPATIENT
Start: 2025-02-11 | End: 2025-02-12 | Stop reason: HOSPADM

## 2025-02-11 RX ORDER — ONDANSETRON 2 MG/ML
INJECTION INTRAMUSCULAR; INTRAVENOUS PRN
Status: DISCONTINUED | OUTPATIENT
Start: 2025-02-11 | End: 2025-02-11 | Stop reason: SURG

## 2025-02-11 RX ORDER — BUPIVACAINE HYDROCHLORIDE AND EPINEPHRINE 5; 5 MG/ML; UG/ML
INJECTION, SOLUTION EPIDURAL; INTRACAUDAL; PERINEURAL
Status: DISCONTINUED | OUTPATIENT
Start: 2025-02-11 | End: 2025-02-11 | Stop reason: HOSPADM

## 2025-02-11 RX ORDER — LIDOCAINE HYDROCHLORIDE 20 MG/ML
INJECTION, SOLUTION EPIDURAL; INFILTRATION; INTRACAUDAL; PERINEURAL PRN
Status: DISCONTINUED | OUTPATIENT
Start: 2025-02-11 | End: 2025-02-11 | Stop reason: SURG

## 2025-02-11 RX ORDER — HYDRALAZINE HYDROCHLORIDE 20 MG/ML
5 INJECTION INTRAMUSCULAR; INTRAVENOUS
Status: DISCONTINUED | OUTPATIENT
Start: 2025-02-11 | End: 2025-02-11 | Stop reason: HOSPADM

## 2025-02-11 RX ORDER — EPINEPHRINE 1 MG/ML(1)
AMPUL (ML) INJECTION
Status: DISCONTINUED | OUTPATIENT
Start: 2025-02-11 | End: 2025-02-11 | Stop reason: HOSPADM

## 2025-02-11 RX ORDER — HYDROMORPHONE HYDROCHLORIDE 1 MG/ML
0.5 INJECTION, SOLUTION INTRAMUSCULAR; INTRAVENOUS; SUBCUTANEOUS
Status: DISCONTINUED | OUTPATIENT
Start: 2025-02-11 | End: 2025-02-12 | Stop reason: HOSPADM

## 2025-02-11 RX ORDER — HALOPERIDOL 5 MG/ML
1 INJECTION INTRAMUSCULAR
Status: DISCONTINUED | OUTPATIENT
Start: 2025-02-11 | End: 2025-02-11 | Stop reason: HOSPADM

## 2025-02-11 RX ORDER — MIDAZOLAM HYDROCHLORIDE 1 MG/ML
1 INJECTION INTRAMUSCULAR; INTRAVENOUS
Status: DISCONTINUED | OUTPATIENT
Start: 2025-02-11 | End: 2025-02-11 | Stop reason: HOSPADM

## 2025-02-11 RX ORDER — EPHEDRINE SULFATE 50 MG/ML
INJECTION, SOLUTION INTRAVENOUS PRN
Status: DISCONTINUED | OUTPATIENT
Start: 2025-02-11 | End: 2025-02-11 | Stop reason: SURG

## 2025-02-11 RX ORDER — OXYCODONE HCL 5 MG/5 ML
10 SOLUTION, ORAL ORAL
Status: DISCONTINUED | OUTPATIENT
Start: 2025-02-11 | End: 2025-02-11 | Stop reason: HOSPADM

## 2025-02-11 RX ORDER — TRANEXAMIC ACID 100 MG/ML
INJECTION, SOLUTION INTRAVENOUS PRN
Status: DISCONTINUED | OUTPATIENT
Start: 2025-02-11 | End: 2025-02-11 | Stop reason: SURG

## 2025-02-11 RX ORDER — KETOROLAC TROMETHAMINE 15 MG/ML
15 INJECTION, SOLUTION INTRAMUSCULAR; INTRAVENOUS EVERY 12 HOURS PRN
Status: DISCONTINUED | OUTPATIENT
Start: 2025-02-11 | End: 2025-02-12 | Stop reason: HOSPADM

## 2025-02-11 RX ORDER — LABETALOL HYDROCHLORIDE 5 MG/ML
5 INJECTION, SOLUTION INTRAVENOUS
Status: DISCONTINUED | OUTPATIENT
Start: 2025-02-11 | End: 2025-02-11 | Stop reason: HOSPADM

## 2025-02-11 RX ADMIN — MEPERIDINE HYDROCHLORIDE 12.5 MG: 25 INJECTION INTRAMUSCULAR; INTRAVENOUS; SUBCUTANEOUS at 16:24

## 2025-02-11 RX ADMIN — HYDRALAZINE HYDROCHLORIDE 5 MG: 20 INJECTION, SOLUTION INTRAMUSCULAR; INTRAVENOUS at 13:30

## 2025-02-11 RX ADMIN — LIDOCAINE HYDROCHLORIDE 100 MG: 20 INJECTION, SOLUTION EPIDURAL; INFILTRATION; INTRACAUDAL; PERINEURAL at 12:52

## 2025-02-11 RX ADMIN — FENTANYL CITRATE 50 MCG: 50 INJECTION, SOLUTION INTRAMUSCULAR; INTRAVENOUS at 12:34

## 2025-02-11 RX ADMIN — LEVETIRACETAM 750 MG: 500 TABLET, FILM COATED ORAL at 20:20

## 2025-02-11 RX ADMIN — ROCURONIUM BROMIDE 50 MG: 10 INJECTION INTRAVENOUS at 12:54

## 2025-02-11 RX ADMIN — BUPIVACAINE HYDROCHLORIDE 20 ML: 2.5 INJECTION, SOLUTION EPIDURAL; INFILTRATION; INTRACAUDAL at 12:34

## 2025-02-11 RX ADMIN — FENTANYL CITRATE 50 MCG: 50 INJECTION, SOLUTION INTRAMUSCULAR; INTRAVENOUS at 12:51

## 2025-02-11 RX ADMIN — SUGAMMADEX 200 MG: 100 INJECTION, SOLUTION INTRAVENOUS at 14:17

## 2025-02-11 RX ADMIN — ONDANSETRON 4 MG: 2 INJECTION INTRAMUSCULAR; INTRAVENOUS at 14:17

## 2025-02-11 RX ADMIN — MIDAZOLAM HYDROCHLORIDE 1 MG: 1 INJECTION, SOLUTION INTRAMUSCULAR; INTRAVENOUS at 17:00

## 2025-02-11 RX ADMIN — EPHEDRINE SULFATE 10 MG: 50 INJECTION, SOLUTION INTRAVENOUS at 13:00

## 2025-02-11 RX ADMIN — ACETAMINOPHEN 1000 MG: 10 INJECTION INTRAVENOUS at 23:07

## 2025-02-11 RX ADMIN — EPHEDRINE SULFATE 10 MG: 50 INJECTION, SOLUTION INTRAVENOUS at 12:58

## 2025-02-11 RX ADMIN — PROPOFOL 50 MG: 10 INJECTION, EMULSION INTRAVENOUS at 12:53

## 2025-02-11 RX ADMIN — CEFAZOLIN 2 G: 1 INJECTION, POWDER, FOR SOLUTION INTRAMUSCULAR; INTRAVENOUS at 12:50

## 2025-02-11 RX ADMIN — DEXAMETHASONE SODIUM PHOSPHATE 8 MG: 4 INJECTION INTRA-ARTICULAR; INTRALESIONAL; INTRAMUSCULAR; INTRAVENOUS; SOFT TISSUE at 13:04

## 2025-02-11 RX ADMIN — PROPOFOL 100 MG: 10 INJECTION, EMULSION INTRAVENOUS at 12:52

## 2025-02-11 RX ADMIN — SODIUM CHLORIDE, POTASSIUM CHLORIDE, SODIUM LACTATE AND CALCIUM CHLORIDE: 600; 310; 30; 20 INJECTION, SOLUTION INTRAVENOUS at 12:45

## 2025-02-11 RX ADMIN — TRANEXAMIC ACID 1000 MG: 100 INJECTION, SOLUTION INTRAVENOUS at 12:58

## 2025-02-11 ASSESSMENT — LIFESTYLE VARIABLES
TOTAL SCORE: 0
ALCOHOL_USE: NO
EVER HAD A DRINK FIRST THING IN THE MORNING TO STEADY YOUR NERVES TO GET RID OF A HANGOVER: NO
AVERAGE NUMBER OF DAYS PER WEEK YOU HAVE A DRINK CONTAINING ALCOHOL: 0
HOW MANY TIMES IN THE PAST YEAR HAVE YOU HAD 5 OR MORE DRINKS IN A DAY: 0
EVER FELT BAD OR GUILTY ABOUT YOUR DRINKING: NO
CONSUMPTION TOTAL: NEGATIVE
TOTAL SCORE: 0
TOTAL SCORE: 0
HAVE YOU EVER FELT YOU SHOULD CUT DOWN ON YOUR DRINKING: NO
ON A TYPICAL DAY WHEN YOU DRINK ALCOHOL HOW MANY DRINKS DO YOU HAVE: 0
HAVE PEOPLE ANNOYED YOU BY CRITICIZING YOUR DRINKING: NO

## 2025-02-11 ASSESSMENT — SOCIAL DETERMINANTS OF HEALTH (SDOH)
WITHIN THE LAST YEAR, HAVE YOU BEEN KICKED, HIT, SLAPPED, OR OTHERWISE PHYSICALLY HURT BY YOUR PARTNER OR EX-PARTNER?: NO
WITHIN THE PAST 12 MONTHS, YOU WORRIED THAT YOUR FOOD WOULD RUN OUT BEFORE YOU GOT THE MONEY TO BUY MORE: NEVER TRUE
WITHIN THE PAST 12 MONTHS, THE FOOD YOU BOUGHT JUST DIDN'T LAST AND YOU DIDN'T HAVE MONEY TO GET MORE: NEVER TRUE
WITHIN THE LAST YEAR, HAVE YOU BEEN HUMILIATED OR EMOTIONALLY ABUSED IN OTHER WAYS BY YOUR PARTNER OR EX-PARTNER?: NO
WITHIN THE LAST YEAR, HAVE YOU BEEN AFRAID OF YOUR PARTNER OR EX-PARTNER?: NO
WITHIN THE LAST YEAR, HAVE TO BEEN RAPED OR FORCED TO HAVE ANY KIND OF SEXUAL ACTIVITY BY YOUR PARTNER OR EX-PARTNER?: NO
IN THE PAST 12 MONTHS, HAS THE ELECTRIC, GAS, OIL, OR WATER COMPANY THREATENED TO SHUT OFF SERVICE IN YOUR HOME?: NO

## 2025-02-11 ASSESSMENT — PAIN DESCRIPTION - PAIN TYPE
TYPE: SURGICAL PAIN

## 2025-02-11 ASSESSMENT — COGNITIVE AND FUNCTIONAL STATUS - GENERAL
MOVING TO AND FROM BED TO CHAIR: A LITTLE
DAILY ACTIVITIY SCORE: 18
HELP NEEDED FOR BATHING: A LITTLE
SUGGESTED CMS G CODE MODIFIER DAILY ACTIVITY: CK
DRESSING REGULAR UPPER BODY CLOTHING: A LOT
WALKING IN HOSPITAL ROOM: A LITTLE
MOBILITY SCORE: 19
DRESSING REGULAR LOWER BODY CLOTHING: A LOT
TOILETING: A LITTLE
MOVING FROM LYING ON BACK TO SITTING ON SIDE OF FLAT BED: A LITTLE
SUGGESTED CMS G CODE MODIFIER MOBILITY: CK
CLIMB 3 TO 5 STEPS WITH RAILING: A LOT

## 2025-02-11 ASSESSMENT — FIBROSIS 4 INDEX
FIB4 SCORE: 1.66
FIB4 SCORE: 1.66

## 2025-02-11 NOTE — INTERVAL H&P NOTE
Consented Procedure: Left shoulder arthroscopic rotator cuff repair, biceps tenodesis, subacromial decompression, distal clavicle resection and repairs as indicated  I have examined the patient, provided the risks, benefits, and alternatives to the procedure(s) indicated on the signed consent form, and the patient wishes to proceed.    H&P reviewed. The patient was examined and there are no changes to the H&P      Cain Gerardo M.D.  02/11/25 10:52 AM

## 2025-02-11 NOTE — ANESTHESIA PROCEDURE NOTES
Peripheral Block    Date/Time: 2/11/2025 12:34 PM    Performed by: Angelo Merrill M.D.  Authorized by: Angelo Merrill M.D.    Patient Location:  Pre-op  Start Time:  2/11/2025 12:34 PM  End Time:  2/11/2025 12:38 PM  Reason for Block: at surgeon's request and post-op pain management ONLY    patient identified, IV checked, site marked, risks and benefits discussed, surgical consent, monitors and equipment checked, pre-op evaluation and timeout performed    Patient Position:  Supine  Prep: ChloraPrep    Monitoring:  Heart rate, continuous pulse ox and cardiac monitor  Block Region:  Upper Extremity  Upper Extremity - Block Type:  BRACHIAL PLEXUS block, Interscalene approach    Laterality:  Left  Procedures: ultrasound guided  Image captured, interpreted and electronically stored.  Block Type:  Single-shot  Needle Length:  100mm  Needle Gauge:  21 G  Needle Localization:  Ultrasound guidance  Ultrasound picture in chart  Injection Assessment:  Negative aspiration for heme, no paresthesia on injection, incremental injection and local visualized surrounding nerve on ultrasound  Evidence of intravascular injection: No     US Guided Interscalene Brachial Plexus Block   US transducer placed on the neck in oblique plane approximately at the level of C6.  Anterior and Middle Scalene (MSM) muscles identified with nerve trunks identified between the muscles.  Needle inserted lateral to probe and advanced under direct visualization through the MSM into a perineural position.  After negative aspiration, LA injected with ease and visualized surrounding the nerve trunks.

## 2025-02-11 NOTE — ANESTHESIA POSTPROCEDURE EVALUATION
Patient: Yoselin Jorge    Procedure Summary       Date: 02/11/25 Room / Location:  OR  / SURGERY [facility]    Anesthesia Start: 1245 Anesthesia Stop: 1432    Procedure: Left shoulder arthroscopic rotator cuff repair, biceps tenodesis, subacromial decompression, distal clavicle resection (Left: Shoulder) Diagnosis:       Rotator cuff tear      Subacromial bursitis      Nontraumatic rupture of right proximal biceps tendon      Acromioclavicular arthrosis      Superior glenoid labrum lesion of left shoulder      (Rotator cuff tear [M75.100]Subacromial bursitis [M75.50]Nontraumatic rupture of right proximal biceps tendon [M66.321]Acromioclavicular arthrosis [M19.019]Superior glenoid labrum lesion of left shoulder [S43.432A])    Surgeons: Cain Gerardo M.D. Responsible Provider: Angelo Merrill M.D.    Anesthesia Type: general, peripheral nerve block ASA Status: 2            Final Anesthesia Type: general, peripheral nerve block  Last vitals  BP   Blood Pressure : 106/41    Temp   36 °C (96.8 °F)    Pulse   75   Resp   18    SpO2   94 %      Anesthesia Post Evaluation    Patient location during evaluation: PACU  Patient participation: complete - patient participated  Level of consciousness: awake and alert    Airway patency: patent  Anesthetic complications: no  Cardiovascular status: hemodynamically stable  Respiratory status: acceptable  Hydration status: euvolemic    PONV: none          There were no known notable events for this encounter.     Nurse Pain Score: 0 (NPRS)

## 2025-02-11 NOTE — ANESTHESIA TIME REPORT
Anesthesia Start and Stop Event Times       Date Time Event    2/11/2025 1229 Ready for Procedure     1245 Anesthesia Start     1432 Anesthesia Stop          Responsible Staff  02/11/25      Name Role Begin End    Angelo Merrill M.D. Anesth 1245 1432          Overtime Reason:  no overtime (within assigned shift)    Comments:

## 2025-02-11 NOTE — ANESTHESIA PREPROCEDURE EVALUATION
Case: [id number] Date/Time: 02/11/25 1230    Procedure: Left shoulder arthroscopic rotator cuff repair, biceps tenodesis, subacromial decompression, distal clavicle resection and repairs as indicated (Left)    Diagnosis:       Rotator cuff tear [M75.100]      Subacromial bursitis [M75.50]      Nontraumatic rupture of right proximal biceps tendon [M66.321]      Acromioclavicular arthrosis [M19.019]      Superior glenoid labrum lesion of left shoulder [S43.432A]    Pre-op diagnosis: Rotator cuff tear [M75.100]Subacromial bursitis [M75.50]Nontraumatic rupture of right proximal biceps tendon [M66.321]Acromioclavicular arthrosis [M19.019]Superior glenoid labrum lesion of left shoulder [S43.432A]    Location:  OR 03 / SURGERY [facility]    Surgeons: Cain Gerardo M.D.            Relevant Problems   NEURO   (positive) Seizure (HCC)      ENDO   (positive) Hypothyroidism (acquired)      Other   (positive) Mixed hyperlipidemia   (positive) Prediabetes   (positive) Rotator cuff tear   (positive) Superior glenoid labrum lesion of left shoulder   GERD-controlled  History of nasopharyngeal cancer s/p chemo and radiation. Required glidescope for previous intubations.     Physical Exam    Airway   Mallampati: II  TM distance: >3 FB  Neck ROM: full       Cardiovascular - normal exam  Rhythm: regular  Rate: normal  (-) murmur     Dental - normal exam           Pulmonary - normal exam  Breath sounds clear to auscultation     Abdominal    Neurological - normal exam                   Anesthesia Plan    ASA 2       Plan - general and peripheral nerve block     Peripheral nerve block will be post-op pain control  Airway plan will be LMA          Induction: intravenous    Postoperative Plan: Postoperative administration of opioids is intended.    Pertinent diagnostic labs and testing reviewed    Informed Consent:    Anesthetic plan and risks discussed with patient.    Use of blood products discussed with: patient whom consented to blood  products.

## 2025-02-11 NOTE — LETTER
January 24, 2025    Patient Name: Yoselin Jorge  Surgeon Name: Cain Gerardo M.D.  Surgery Facility: [facility] ([address] [place])  Surgery Date: 3/11/2025    The time of your surgery is not final and may change up to and until the day of your surgery. You will be contacted 24-48 hours prior to your surgery date with your check-in and surgery time.    If you will not be at one of the below numbers please call the surgery scheduler at [phone]  Preferred Phone: [phone]    BEFORE YOUR SURGERY   Pre Registration and/or Lab Work must be done within and no earlier than 28 days prior to your surgery date. Your scheduled facility will contact you regarding all required preregistration and/or lab work. If you have not been contacted within 7 days of your scheduled procedure please call [facility] at [phone] for an appointment as soon as possible.    DAY OF YOUR SURGERY  Nothing to eat or drink after midnight     Refrain from smoking any substance after midnight prior to surgery. Smoking may interfere with the anesthetic and frequently produces nausea during the recovery period.    Continue taking all lifesaving medications. Including the morning of your surgery with small sip of water.    Please do NOT take on the day of surgery:  Diuretics: examples- furosemide (Lasix), spironolactone, hydrochlorothiazide  ACE-inhibitors: examples- lisinopril, ramipril, enalapril  “ARBs”: examples- losartan, Olmesartan, valsartan    Please arrive at the hospital/surgery center at the check-in time provided.     An adult will need to bring you and take you home after your surgery.     AFTER YOUR SURGERY  Post op Appointment:   Date: 3/24/25   Time: 11:30 AM   With: Cain Gerardo MD   Location: [address] [place] [address] [place] [address]        - Therapy- Your first appointment should be 6  week(s) after your surgery. For your convenience we have 4 Physical Therapy  locations: [facility], [place], and [facility]. Call our office ASAP to schedule an appointment at [phone] or take the enclosed Therapy Prescription to a facility of your choice.  - You must have someone provide transportation post surgery and someone to monitor you for at least 24 hours post-surgery. If you don't have either of these your appointment will be canceled.     TIME OFF WORK  FMLA or Disability forms can be faxed directly to: [phone] or you may drop them off at [address] [place] [facility] [place] [address]. Our office charges a $35.00 fee per form. Forms will be completed within 10 business days of drop off and payment received. For the status of your forms you may contact our disability office directly [phone].    MEDICATION INSTRUCTIONS **Please read section completely**  The following medications should be stopped a minimum of 10 days prior to surgery:  All over the counter, Supplements & Herbal medications    Anorectics: Phentermine (Adipex-P, Lomaira and Suprenza), Phentermine-topiramate (Qsymia), Bupropion-naltrexone (Contrave)    **If you are on Bupropion for anxiety/depression, please continue this medication up until the day of surgery.     Opiod Partial Agonists/Opioid Antagonists: Buprenorphine (Suboxone, Belbuca, Butrans, Probuphine Implant, Sublocade), Naltrexone (ReVia, Vivitrol), Naloxone    Amphetamines: Dextroamphetamine/Amphetamine (Adderall, Mydayis), Methylphenidate Hydrochloride (Concerta, Metadate, Methylin, Ritalin)    The following medications should be stopped 5 days prior to surgery:  Blood Thinners: Any Aspirin, Aspirin products, anti-inflammatories such as ibuprofen and any blood thinners such as Coumadin and Plavix. Please consult your prescribing physician if you are on life saving blood thinners, in regards to when to stop medications prior to surgery.     The following medications should be stopped a minimum of 3 days prior to surgery:  PDE-5  inhibitors: Sildenafil (Viagra), Tadalafil (Cialis), Vardenafil (Levitra), Avanafil (Stendra)    MAO Inhibitors: Rasagiline (Azilect), Selegiline (Eldepryl, Emsam, Selapar), Isocarboxazid (Marplan), Phenelzine (Nardil)

## 2025-02-11 NOTE — OP REPORT
SURGEON:  Cain Gerardo M.D.    PREOPERATIVE DIAGNOSIS:    Left shoulder rotator cuff tear, proximal biceps pathology, subacromial impingement, AC joint arthrosis and labral pathology.     POSTOPERATIVE DIAGNOSIS:    Left shoulder full-thickness rotator cuff tear  Left shoulder proximal biceps pathology  Left shoulder subacromial impingement  Left shoulder acromioclavicular joint arthrosis  Left shoulder type I superior labral anterior to posterior tear  Left shoulder synovitis    PROCEDURES PERFORMED:      Left shoulder diagnostic arthroscopy with arthroscopic rotator cuff repair  Left shoulder arthroscopic biceps tenodesis  Left shoulder arthroscopic subacromial decompression  Left shoulder arthroscopic distal clavicle resection  Left shoulder extensive glenohumeral joint debridement    ASSISTANT:  Claribel Chapman PA-C    ANESTHESIA:   General and an interscalene nerve block    ANESTHESIOLOGIST:   Angelo Merrill MD    IMPLANTS:   Two 4.75 mm Wilfrid biocomposite alpha vent suture anchors, two 1.4 mm Iconix suture anchors    COMPLICATIONS:  None.    DISPOSITION:  Stable to Post Anesthesia Care Unit.    INDICATIONS:  The patient has had progressive shoulder pain that has been unresponsive to conservative management. The risks, benefits, alternative and limitations of surgical intervention were discussed in detail. They have expressed understanding and desire to proceed with surgical intervention.    PROCEDURE IN DETAIL:  The patient and the correct operative extremity were identified in the preoperative area.  The extremity was marked.  The patient was brought to the operating room where the correct operative extremity was again confirmed.  They were placed supine on the OR table after undergoing an interscalene nerve block performed at my request for post-operative pain management. General anesthesia was then induced without complication.  Examination under anesthesia showed full range of motion.  The extremity was  prepped with alcohol and the subacromial space injected with 1% lidocaine with epinephrine.  The extremity was then prepped and draped in the usual sterile fashion using ChloraPrep.    Diagnostic arthroscopy was then performed which showed a 1cm area of grade IV chondromalacia of the posterior aspect of the articular cartilage of the humeral head, intact articular cartilage of the glenoid. The labrum showed a type I SLAP tear. The biceps showed extensive splitting and fraying of the intraarticular portion of the biceps. The subscapularis showed fraying of the superior fibers which was debrided. The supraspinatus was torn. The Infraspinatus was torn anteriorly. The teres minor was intact. There was significant synovitis in the joint.    The labrum was debrided circumferentially. Synovectomy was performed and the biceps was tenotomized. The scope was then withdrawn and replaced in the subacromial space.  There was a large amount of inflamed irritated bursa within the subacromial space.  There was extensive splitting and fraying on the undersurface of the CA ligament.    The undersurface of the acromion was exposed revealing a large subacromial spur.  An anterior acromioplasty was performed taking the acromion down to a smooth flat type I.  There was extensive arthrosis of the AC joint.  A circumferential resection of 8 to 10 mm of the distal clavicle was then performed.  The bony debris was removed from the subacromial space.      The biceps was then tenodesed within the bicipital groove with two 1.4 mm iconix anchors, a cinch stitch proximally and a cerclage stitch distally.  The excess biceps was excised.      The full thickness, delaminated rotator cuff tear was then identified and debrided.  The greater tuberosity was debrided down to bleeding bone.  Bone marrow vents were placed medially.  Two triple loaded anchors were placed centrally in the greater tuberosity.  Six simple mattress stitches were passed through  the torn rotator cuff and tied down securely with sliding locking SMC knots with alternating stacked half hitches.  This resulted in a secure repair of the rotator cuff.    A spinal needle was then placed into the subacromial space under direct vision. The portals were closed with 3-0 Nylon. The extremity was injected with 0.5% bupivacaine with epinephrine.  Sterile dressings were applied. The patient was placed into an UltraSling. The patient was then allowed to awake from anesthesia, transferred to their hospital cart, and taken to the Post Anesthesia Care Unit in stable condition.  The patient tolerated the procedure well.  There were no immediate complications.    An abduction shoulder sling was provided following the above surgery to keep the patient's arm well supported during the healing phase. A delay in providing this brace would place the patient at risk of reinjury.

## 2025-02-11 NOTE — DISCHARGE INSTRUCTIONS
If any questions arise, call your provider.  If your provider is not available, please feel free to call the Surgical Center at [phone].    MEDICATIONS: Resume taking daily medication.  Take prescribed pain medication with food.  If no medication is prescribed, you may take non-aspirin pain medication if needed.  PAIN MEDICATION CAN BE VERY CONSTIPATING.  Take a stool softener or laxative such as senokot, pericolace, or milk of magnesia if needed.    Last pain medication given at     What to Expect Post Anesthesia    Rest and take it easy for the first 24 hours.  A responsible adult is recommended to remain with you during that time.  It is normal to feel sleepy.  We encourage you to not do anything that requires balance, judgment or coordination.    FOR 24 HOURS DO NOT:  Drive, operate machinery or run household appliances.  Drink beer or alcoholic beverages.  Make important decisions or sign legal documents.    To avoid nausea, slowly advance diet as tolerated, avoiding spicy or greasy foods for the first day.  Add more substantial food to your diet according to your provider's instructions.  Babies can be fed formula or breast milk as soon as they are hungry.  INCREASE FLUIDS AND FIBER TO AVOID CONSTIPATION.    MILD FLU-LIKE SYMPTOMS ARE NORMAL.  YOU MAY EXPERIENCE GENERALIZED MUSCLE ACHES, THROAT IRRITATION, HEADACHE AND/OR SOME NAUSEA.    Shoulder Surgery Discharge Instructions    ACTIVITY  It is important to move your shoulder, as well as your elbow, wrist, and hand several times daily, starting the day after surgery.  You may do pendulum exercises with your operative arm starting the day after surgery.  Pendulum (dangling Teller) exercises are encouraged 2-3 times daily.  The sling will need to be removed for pendulum exercises.     NON-WEIGHT BEARING    Do not lift with your injured arm.  Do not lean into or carry anything in the injured arm.  Do not use your arm to push yourself up in bed or from a  chair.  Avoid pulling and pushing with the arm on your affected side.   Follow these restrictions until cleared by your follow-up provider.     SLING / SHOULDER IMMOBILIZER:  The sling should be on at all times, except when bathing and doing your demonstrated exercises.     DRESSING AND WOUND CARE    Keep your shoulder dressing clean and dry after surgery.  Be aware that some leaking of blood or fluid from your dressing can occur and is normal. You may remove your dressing 3 days after the operation.  Notice that you have a single incision and/or several small incisions that have been closed with stitches.  Cover each of these incisions with a light dressing or band-aids.  This keeps the surgical incisions clean, as well as preventing your clothes from spotting with blood or fluid.  Change band-aids or light dressing daily.    SHOWER AND BATHING  Keep the shoulder dry for 3 days after your surgery.  Then, you may shower. You may let soap and water run over skin incisions, but do not immerse your shoulder in water.  No swimming pools, hot tubs, or baths are recommended until after your follow up and then only if cleared by your surgeon.     ICE  Apply an ice pack to your shoulder (15 minutes on the shoulder, 15 minutes off the shoulder), as you feel necessary to help with the pain and swelling.        FOLLOW-UP  Make sure that you have an appointment 7-14 days following surgery.Your procedure/rehabilitation will be discussed and physical therapy may be prescribed at that time.         Peripheral Nerve Block Discharge Instructions from Same Day Surgery and Inpatient :    What to Expect - Upper Extremity  You may experience numbness and weakness in shoulder, arm, and hand  on the same side as your surgery  This is normal. For some people, this may be an unpleasant sensation. Be very careful with your numb limb  Ask for help when you need it  Shoulder Surgery Side Effects  In addition to numbness and weakness you may  "experience other symptoms  Other nerves that are close to those nerves injected can also be affected by local anesthesia  You may experience a hoarseness in your voice  Your breathing may feel different  You may also notice drooping of your eyelid, pupil constriction, and decreased sweating, on the side of your surgery  All of these side effects are normal and will resolve when the local anesthetic wears off   Prevent Injury  Protect the limb like a baby  Beware of exposing your limb to extreme heat or cold or trauma  The limb may be injured without you noticing because it is numb  Keep the limb elevated whenever possible  Do not sleep on the limb  Change the position of the limb regularly  Avoid putting pressure on your surgical limb  Pain Control  The initial block on the day of surgery will make your extremity feel \"numb\"  Any consecutive injection including prior to discharge from the hospital will make your extremity feel \"numb\"  You may feel an aching or burning when the local anesthesia starts to wear off  Take pain pills as prescribed by your surgeon  Call your surgeon or anesthesiologist if you do not have adequate pain control    "

## 2025-02-11 NOTE — OR NURSING
1429 Pt to PACU from OR via gurney, respirations spontaneous and non-labored via OPA. Left shoulder surgical sites clean, dry and intact, pt not arousable on calling. Ice pack placed on left shoulder surgical sight, 2+ left radial pulse noted and cap refill < 3 seconds to left fingers.  1431 OPA D/C'd pt arouses spontaneously, able to follow commands, lift neck, and open mouth.    1440 Pt calm sleeping no c/o pain or nausea.   1512 Report to ELISSA Alvarenga

## 2025-02-11 NOTE — OR NURSING
1512 assumed pt care.     1530 pt sleeping, VSS.    1600 pt sleeping, VSS.    1615 pt assisted w/ oral suctioning; denies any pain or discomfort.     1630 pt is increasing the need to be suctioned d/t inability to fully expectorate thick oral secretions. Pt also c/o shivering, orders received and carried out.    1650 pt showing signs of tachypnea; 02 sats 95%-96% on room air. Pt also verbalized that she's having difficulty taking in breaths. Pt sat up in reclining chair and Dr. Merrill notified.     1655 Dr Merrill at chairside assessing pt. Orders received for Versed 1 mg x1 .    1715 pt appears more calm, shivering has subsided; however, pt's o2 sat has decreased in the low 80's on room air. Pt placed on 2L nasal cannula.     1730 Dr Merrill at chairside.     1807 pt care transferred to ELISSA Menchaca.

## 2025-02-11 NOTE — DISCHARGE INSTR - OTHER INFO
Keep dressings on for 2 days.  After two days, you may shower with wounds uncovered using normal soap and water.  Apply band aids to wounds after shower. DO NOT SUBMERGE INCSIONS for 3 weeks.  Keep sling on at all times except bathing. Non-weightbearing with the operative arm.  If sitting, it is OK to remove the sling temporarily for gentle motion at the elbow then replace sling.  It is OK to use the wrist and hand for simple activities while in the sling but do not attempt to lift the arm, lift anything with the arm, or twist hard with the hand (opening jars, screwdrivers, etc.)

## 2025-02-11 NOTE — ANESTHESIA PROCEDURE NOTES
Airway    Date/Time: 2/11/2025 12:54 PM    Performed by: Angelo Merrill M.D.  Authorized by: Angelo Merrill M.D.    Location:  OR  Urgency:  Elective  Difficult Airway: No    Indications for Airway Management:  Anesthesia      Spontaneous Ventilation: absent    Sedation Level:  Deep  Preoxygenated: Yes    Patient Position:  Sniffing  Mask Difficulty Assessment:  0 - not attempted  Final Airway Type:  Endotracheal airway  Final Endotracheal Airway:  ETT  Cuffed: Yes    Technique Used for Successful ETT Placement:  Video laryngoscopy  Devices/Methods Used in Placement:  Anterior pressure/BURP    Insertion Site:  Oral  Blade Type:  Glide  Laryngoscope Blade/Videolaryngoscope Blade Size:  3  ETT Size (mm):  6.0  Measured from:  Teeth  ETT to Teeth (cm):  20  Placement Verified by: auscultation and capnometry    Cormack-Lehane Classification:  Grade IIa - partial view of glottis  Number of Attempts at Approach:  1   Initially attempted to ventilate with LMA, unable to get good seal or EtCO2.  Quickly elected to intubate. Atraumatic DLx1 with glidescope 3.  Challenging view even with glidescope due to head and neck radiation history.

## 2025-02-12 ENCOUNTER — APPOINTMENT (OUTPATIENT)
Dept: ADMISSIONS | Facility: MEDICAL CENTER | Age: 80
End: 2025-02-12
Payer: MEDICARE

## 2025-02-12 VITALS
WEIGHT: 166.01 LBS | DIASTOLIC BLOOD PRESSURE: 42 MMHG | HEIGHT: 63 IN | RESPIRATION RATE: 17 BRPM | OXYGEN SATURATION: 95 % | BODY MASS INDEX: 29.41 KG/M2 | TEMPERATURE: 97.6 F | HEART RATE: 55 BPM | SYSTOLIC BLOOD PRESSURE: 113 MMHG

## 2025-02-12 PROCEDURE — 700111 HCHG RX REV CODE 636 W/ 250 OVERRIDE (IP): Mod: JZ | Performed by: ORTHOPAEDIC SURGERY

## 2025-02-12 PROCEDURE — A9270 NON-COVERED ITEM OR SERVICE: HCPCS | Performed by: STUDENT IN AN ORGANIZED HEALTH CARE EDUCATION/TRAINING PROGRAM

## 2025-02-12 PROCEDURE — 700102 HCHG RX REV CODE 250 W/ 637 OVERRIDE(OP): Performed by: STUDENT IN AN ORGANIZED HEALTH CARE EDUCATION/TRAINING PROGRAM

## 2025-02-12 PROCEDURE — A9270 NON-COVERED ITEM OR SERVICE: HCPCS | Performed by: ORTHOPAEDIC SURGERY

## 2025-02-12 PROCEDURE — 700102 HCHG RX REV CODE 250 W/ 637 OVERRIDE(OP): Performed by: ORTHOPAEDIC SURGERY

## 2025-02-12 PROCEDURE — 770030 HCHG ROOM/CARE - EXTENDED RECOVERY EACH 15 MIN

## 2025-02-12 RX ADMIN — ACETAMINOPHEN 1000 MG: 10 INJECTION INTRAVENOUS at 06:10

## 2025-02-12 RX ADMIN — OXYCODONE HYDROCHLORIDE 5 MG: 5 SOLUTION ORAL at 10:22

## 2025-02-12 RX ADMIN — LEVETIRACETAM 750 MG: 500 TABLET, FILM COATED ORAL at 05:56

## 2025-02-12 ASSESSMENT — PAIN DESCRIPTION - PAIN TYPE: TYPE: ACUTE PAIN

## 2025-02-12 NOTE — CARE PLAN
The patient is Stable - Low risk of patient condition declining or worsening    Shift Goals  Clinical Goals: Pain management, Monitor vital signs, safety  Patient Goals: Rest  Family Goals: Stay up to date on plan of care    Progress made toward(s) clinical / shift goals:    Problem: Extended Recovery Optimal Care  Goal: Early mobilization post surgery  Outcome: Progressing  Note: Dressing check Q4hr, gown repositioned underneath arm sling, patient ambulated to the bathroom with RN and CNA present. 400mLs of urine output. Pt then ambulated the calvillo with RN and CNA. Pt reported feeling more capable of walking and performing ADLs.      Problem: Extended Recovery Optimal Care  Goal: Alleviation or reduction of pain post surgery  Outcome: Progressing  Note: Pain assessed Q2hr and non-pharmacological intervention offered to patient. PT reported no pain at incision site, Cold Ice pack reapplied.     Problem: Extended Recovery Optimal Care  Goal: Patient will achieve/maintain normal respiratory rate/effort  Outcome: Progressing  Note: Collaborated with CNA to obtain and document post-op vitals, vital signs monitored.        Patient is not progressing towards the following goals:

## 2025-02-12 NOTE — CARE PLAN
Problem: Extended Recovery Optimal Care  Goal: Patient will achieve/maintain normal respiratory rate/effort  Outcome: Progressing  Goal: Alleviation or reduction of pain post surgery  Outcome: Progressing  Goal: Early mobilization post surgery  Outcome: Progressing     Problem: Pain - Standard  Goal: Alleviation of pain or a reduction in pain to the patient’s comfort goal  Reactivated   The patient is Stable - Low risk of patient condition declining or worsening    Shift Goals  Clinical Goals: Pain management, Monitor vital signs, safety  Patient Goals: Rest  Family Goals: Stay up to date on plan of care    Progress made toward(s) clinical / shift goals:      Patient is not progressing towards the following goals:

## 2025-02-12 NOTE — OR NURSING
1807 - Received report from ELISSA Alvarenga who stated she had paged Dr. Gerardo. Awaiting return phone call.    1822 - Call placed to on call physician/PA.    1835 - Call back received from Guero Good. Orders received for admission and medications and diet. Orders read back to Guero Good and placed into epic.

## 2025-02-19 NOTE — PROGRESS NOTES
4 Eyes Skin Assessment Completed by ELISSA Randolph and ELISSA Cormier.    Head WDL  Ears WDL  Nose WDL  Mouth WDL  Neck WDL  Breast/Chest WDL  Shoulder Blades WDL  Spine WDL  (R) Arm/Elbow/Hand WDL  (L) Arm/Elbow/Hand Dressing in place from surgery, WILLA surgical site.  Abdomen WDL  Groin WDL  Scrotum/Coccyx/Buttocks WDL  (R) Leg WDL  (L) Leg WDL  (R) Heel/Foot/Toe WDL  (L) Heel/Foot/Toe WDL          Devices In Places Blood Pressure Cuff and Pulse Ox, Right Arm Sling      Interventions In Place Gray Ear Foams and Pillows    Possible Skin Injury No    Pictures Uploaded Into Epic N/A  Wound Consult Placed N/A  RN Wound Prevention Protocol Ordered No    
Patient complained of chills or shakiness and pain in left shoulder.  Pt given pain med as ordered.  Vital signs taken and documented.  Patient verbalized that she is not ready to go home and she is not feeling too good.  MD's PA paged and left a voicemail about patient's concerns. Continuously monitoring patient and will follow up.    11:47 - 2nd attempt to reach provider. Sent to voicemail.  Left voicemail for provider.  
Patient spoken with MD about discharge.  Patient discharged via wheelchair with spouse.  Discharge documents and instructions given.    
Received reported for Angelo BOWERS via over the phone. Pt arrived via wheelchair and spouse present. Pt assisted to bed, pt tolerated, POC discussed, pt and family agreeable. Pt reported no pain the time. Call light and belongings within reach. Bed alarm and fall precautions implemented.    
The patient had problems maintaining her oxygen saturation postoperatively and was kept overnight after her rotator cuff surgery.  She was discharged the following day without incident.  
The patient had problems maintaining oxygen saturation postoperatively after rotator cuff repair and she was kept overnight and extended recovery and then discharged the following day.  
 used

## 2025-03-05 ENCOUNTER — HOSPITAL ENCOUNTER (OUTPATIENT)
Dept: LAB | Facility: MEDICAL CENTER | Age: 80
End: 2025-03-05
Attending: FAMILY MEDICINE
Payer: MEDICARE

## 2025-03-05 ENCOUNTER — OFFICE VISIT (OUTPATIENT)
Dept: MEDICAL GROUP | Facility: LAB | Age: 80
End: 2025-03-05
Payer: MEDICARE

## 2025-03-05 VITALS
HEART RATE: 59 BPM | OXYGEN SATURATION: 97 % | SYSTOLIC BLOOD PRESSURE: 124 MMHG | TEMPERATURE: 97.9 F | HEIGHT: 63 IN | WEIGHT: 154 LBS | DIASTOLIC BLOOD PRESSURE: 58 MMHG | BODY MASS INDEX: 27.29 KG/M2 | RESPIRATION RATE: 16 BRPM

## 2025-03-05 DIAGNOSIS — R56.9 SEIZURE (HCC): ICD-10-CM

## 2025-03-05 DIAGNOSIS — F51.01 PRIMARY INSOMNIA: ICD-10-CM

## 2025-03-05 DIAGNOSIS — M25.542 ARTHRALGIA OF BOTH HANDS: ICD-10-CM

## 2025-03-05 DIAGNOSIS — M25.541 ARTHRALGIA OF BOTH HANDS: ICD-10-CM

## 2025-03-05 LAB
CRP SERPL HS-MCNC: 4.12 MG/DL (ref 0–0.75)
ERYTHROCYTE [SEDIMENTATION RATE] IN BLOOD BY WESTERGREN METHOD: 30 MM/HOUR (ref 0–25)
RHEUMATOID FACT SER IA-ACNC: 12 IU/ML (ref 0–14)

## 2025-03-05 PROCEDURE — 85652 RBC SED RATE AUTOMATED: CPT

## 2025-03-05 PROCEDURE — 86140 C-REACTIVE PROTEIN: CPT

## 2025-03-05 PROCEDURE — 86200 CCP ANTIBODY: CPT

## 2025-03-05 PROCEDURE — 3078F DIAST BP <80 MM HG: CPT | Performed by: FAMILY MEDICINE

## 2025-03-05 PROCEDURE — 3074F SYST BP LT 130 MM HG: CPT | Performed by: FAMILY MEDICINE

## 2025-03-05 PROCEDURE — 36415 COLL VENOUS BLD VENIPUNCTURE: CPT

## 2025-03-05 PROCEDURE — 86431 RHEUMATOID FACTOR QUANT: CPT

## 2025-03-05 PROCEDURE — 99214 OFFICE O/P EST MOD 30 MIN: CPT | Performed by: FAMILY MEDICINE

## 2025-03-05 ASSESSMENT — FIBROSIS 4 INDEX: FIB4 SCORE: 1.66

## 2025-03-05 NOTE — PROGRESS NOTES
Verbal consent was acquired by the patient to use J[employer]estream ambient listening note generation during this visit Yes    Subjective:   Yoselin Jorge is a 79 y.o. female here today for   No chief complaint on file.    History of Present Illness  The patient is a 79-year-old female with multiple comorbidities, here today for a medication review. Recently, her medications used for insomnia were increased, and she is no longer taking amitriptyline. She is also currently on Keppra for the treatment of seizures under the direction of neurology and had a follow-up with neurology in 01/2025.    She reports experiencing pain in her right hand, which she attributes to arthritis. The onset of this pain was approximately one week ago, and it has been severe enough to prevent her from opening her hand. She also notes the presence of a bump on her knuckles. Her left hand is also sore, but she is able to keep it open. She has been using Aleve spray and Tylenol Arthritis for relief, but these have not been effective.  Patient states she has a history of rheumatoid arthritis dating back to her early 20s, which was treated with steroids following a pregnancy and resolved after approximately 9 months. She reports no family history of arthritis but notes that her mother had lupus.    She underwent rotator cuff repair surgery on 02/11/2025, performed by Dr. Gerardo. The stitches were removed last week, and while she experiences some soreness, it is manageable. She is scheduled to start physical therapy in 3 weeks.    She has been experiencing poor sleep quality, often waking up after 2 to 3 hours and struggling to return to sleep. She has resorted to taking 2-hour naps in the afternoon, a new development for her. 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 She has been taking over-the-counter  sleep aids, which provide some relief, but she is interested in exploring stronger liquid alternatives.    She is currently on Keppra for seizure management, but the cause of her seizures remains unknown. She underwent a 24-hour EEG, which did not reveal any abnormalities, although some skull thickness was noted. She has an upcoming appointment with her neurologist in a few weeks.      No Known Allergies      Current medicines (including changes today)  Current Outpatient Medications   Medication Sig Dispense Refill    Multiple Vitamins-Minerals (PRESERVISION AREDS PO) Take  by mouth every day.      levETIRAcetam (KEPPRA) 100 MG/ML Solution Take 7.5 mL by mouth every 12 hours for 180 days. 1350 mL 1    pantoprazole (PROTONIX) 20 MG tablet Take 1 Tablet by mouth every day. 90 Tablet 3    levothyroxine (SYNTHROID) 100 MCG Tab Take 1 Tablet by mouth every morning on an empty stomach. 100 Tablet 3    atorvastatin (LIPITOR) 10 MG Tab Take 1 Tablet by mouth every day. 100 Tablet 3    pyridoxine (vitamin B6) 10 mg/mL oral solution Take 10 mL by mouth every day for 180 days. Shake well, refrigerate (Patient not taking: Reported on 3/5/2025) 900 mL 1     No current facility-administered medications for this visit.     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    ROS   ROS  -See HPI     Objective:     Physical Exam:  /58   Pulse (!) 59   Temp 36.6 °C (97.9 °F) (Temporal)   Resp 16   Ht 1.6 m (5' 2.99\")   Wt 69.9 kg (154 lb)   SpO2 97%  Body mass index is 27.29 kg/m².   Constitutional: Alert, no distress, well-groomed.  Skin: No rashes in visible areas.  Eye: Round. Conjunctiva clear, lids normal. No icterus.   ENMT: Lips pink without lesions, good dentition, moist mucous membranes. Phonation normal.  Neck: No masses, no thyromegaly. Moves freely without pain.  Respiratory: Unlabored respiratory effort, no cough or audible wheeze  Psych: Alert and oriented x3, normal affect and mood.  Results      Assessment and Plan:     Assessment & Plan  1. Right hand pain.  The differential diagnosis includes osteoarthritis and rheumatoid arthritis. An x-ray of the hands will be ordered to further evaluate the condition. Additionally, blood work will be conducted to assess for potential arthritis or rheumatoid arthritis. She is advised to continue using her left hand as much as possible. If the results indicate rheumatoid arthritis, appropriate treatment will be initiated. If the findings are normal, the focus will shift towards managing osteoarthritis.    2. Insomnia.  She reports difficulty sleeping, waking up after 2-3 hours and unable to return to sleep. She has been taking over-the-counter sleep aids but finds them only moderately effective. No new sleep medications will be prescribed at this time. Improving her overall health, including managing her pain and adjusting to Keppra, is expected to help with her sleep issues.    3. Seizure disorder.  She is currently on Keppra for seizure management under the direction of " neurology. She reports no known cause for her seizures, and a recent 24-hour EEG showed no abnormalities. She will continue with Keppra as directed by her neurologist. She is advised to start taking vitamin B6 supplements to counteract potential side effects of Keppra, such as anemia and nerve damage.      Orders:  1. Arthralgia of both hands  - RHEUMATOID ARTHRITIS FACTOR; Future  - CCP ANTIBODIES, IGG/IGA; Future  - CRP QUANTITIVE (NON-CARDIAC); Future  - Sed Rate; Future  - DX-JOINT SURVEY-HANDS SINGLE VIEW; Future    2. Seizure (HCC)    3. Primary insomnia    HCC Gap Form    Diagnosis to address: H35.4408 - Exudative age-related macular degeneration of left eye with active choroidal neovascularization (HCC)  Assessment and plan: Chronic, stable. Continue with current defined treatment plan: Continue follow-up with ophthalmologist at regular intervals. Follow-up at least annually.  Last edited 03/05/25 12:10 PST by Brian Cutler M.D.           Followup: No follow-ups on file.         PLEASE NOTE: This dictation was created using voice recognition and Bee [employer] ambient listening software. I have made every reasonable attempt to correct obvious errors, but I expect that there are errors of grammar and possibly content that I did not discover before finalizing the note.

## 2025-03-06 ENCOUNTER — APPOINTMENT (OUTPATIENT)
Dept: RADIOLOGY | Facility: MEDICAL CENTER | Age: 80
End: 2025-03-06
Attending: FAMILY MEDICINE
Payer: MEDICARE

## 2025-03-06 DIAGNOSIS — M25.541 ARTHRALGIA OF BOTH HANDS: ICD-10-CM

## 2025-03-06 DIAGNOSIS — M25.542 ARTHRALGIA OF BOTH HANDS: ICD-10-CM

## 2025-03-06 PROCEDURE — 77077 JOINT SURVEY SINGLE VIEW: CPT

## 2025-03-07 LAB — CCP IGA+IGG SERPL IA-ACNC: 3 UNITS (ref 0–19)

## 2025-03-10 ENCOUNTER — TELEPHONE (OUTPATIENT)
Dept: PHARMACY | Facility: MEDICAL CENTER | Age: 80
End: 2025-03-10

## 2025-03-10 ENCOUNTER — OFFICE VISIT (OUTPATIENT)
Dept: NEUROLOGY | Facility: MEDICAL CENTER | Age: 80
End: 2025-03-10
Attending: STUDENT IN AN ORGANIZED HEALTH CARE EDUCATION/TRAINING PROGRAM
Payer: MEDICARE

## 2025-03-10 VITALS
OXYGEN SATURATION: 98 % | DIASTOLIC BLOOD PRESSURE: 60 MMHG | WEIGHT: 161.16 LBS | RESPIRATION RATE: 17 BRPM | BODY MASS INDEX: 28.55 KG/M2 | HEART RATE: 54 BPM | SYSTOLIC BLOOD PRESSURE: 138 MMHG | HEIGHT: 63 IN

## 2025-03-10 DIAGNOSIS — R56.9 SEIZURES (HCC): ICD-10-CM

## 2025-03-10 PROCEDURE — 99215 OFFICE O/P EST HI 40 MIN: CPT | Performed by: STUDENT IN AN ORGANIZED HEALTH CARE EDUCATION/TRAINING PROGRAM

## 2025-03-10 PROCEDURE — 3075F SYST BP GE 130 - 139MM HG: CPT | Performed by: STUDENT IN AN ORGANIZED HEALTH CARE EDUCATION/TRAINING PROGRAM

## 2025-03-10 PROCEDURE — 3078F DIAST BP <80 MM HG: CPT | Performed by: STUDENT IN AN ORGANIZED HEALTH CARE EDUCATION/TRAINING PROGRAM

## 2025-03-10 PROCEDURE — 99211 OFF/OP EST MAY X REQ PHY/QHP: CPT | Performed by: STUDENT IN AN ORGANIZED HEALTH CARE EDUCATION/TRAINING PROGRAM

## 2025-03-10 RX ORDER — LACOSAMIDE 50 MG/5ML
SOLUTION ORAL
Qty: 670 ML | Refills: 0 | Status: SHIPPED | OUTPATIENT
Start: 2025-03-10 | End: 2025-04-16

## 2025-03-10 RX ORDER — LACOSAMIDE 100 MG/10ML
100 SOLUTION ORAL 2 TIMES DAILY
Qty: 1800 ML | Refills: 0 | Status: SHIPPED | OUTPATIENT
Start: 2025-04-11 | End: 2025-07-10

## 2025-03-10 ASSESSMENT — PATIENT HEALTH QUESTIONNAIRE - PHQ9
CLINICAL INTERPRETATION OF PHQ2 SCORE: 4
5. POOR APPETITE OR OVEREATING: 2 - MORE THAN HALF THE DAYS
SUM OF ALL RESPONSES TO PHQ QUESTIONS 1-9: 11

## 2025-03-10 ASSESSMENT — FIBROSIS 4 INDEX: FIB4 SCORE: 1.66

## 2025-03-10 NOTE — PROGRESS NOTES
"[facility]  Follow up visit    Patient name: Yoselin Jorge  YOB: 1945  MRN: [id number]  Date of visit: 3/10/2025      Background:  Yoselin Jorge is a 79 y.o. woman being seen in follow up for seizures. Last visit 1/29/2025.       Details from initial visit 12/10/2024  The events of concern occurred on 11/24/2024.     She was found at home on the floor by her  in their bedroom. She had been cleaning the bathroom. She seemed to have gone back into the bedroom and fell onto carpeted area. When her  went to her, she smiled at him and told him she took a nap on the floor. He helped her to sit up and she sat for a few minutes, then he helped her up and onto a chair. He told her they need to go to the ED to figure out why she fell. He was not with her immediately after the event, is unsure how long she was down for. She bit the bottom of her lip, no tongue biting. No urinary incontinence.      They went to the ED at [facility]. She then had a witnessed seizure in the ED. Her  notes that she had full body convulsions with wide eyes, mouth was open. She was given medication to abort the seizure, stopped after a couple of minutes. She was taken to a room and appeared confused afterwards, was sluggish and slow to answer questions.     She has no recollection of either episode.      She had an MRI and an EEG which were both unremarkable. She does not remember having the EEG done.     She has been taking Keppra 500 mg twice daily.     She has not had any staring spells, abnormal sensation, confusional episodes, epigastric rising, or other transient symptoms that raise concern for seizures.      Since the seizures, feels \"foggy\" mentally and aches all over.      Her blood pressures were very labile in the hospital. She reports taht her BP went up to 200 and there is a nursing note indicating that she had a BP of 81/39 at one point during her hospitalization.    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      Reports having more stress than usual this year. This year she has had a lot of dental work. She recently underwent yearly esophageal dilation. She is unable to swallow big tablets, anything bigger than a baby aspirin.     She has nighttime leg cramping. This has been intermittent for 2 years. It can start at the hip down to the toes. This wakes her up 2-3 times per night. She uses a topical cream to help this.      She has not been driving.      She has felt off balance since having the seizures. No falls. She missed a bottom step about a year ago and had a lower back compression fracture. No issues since then.       Interval history:  She is accompanied to clinic by her .     No interval seizures.     She's very fatigued on Keppra. She feels that she cannot function. She wants to try another medication.    Ambulatory EEG showed left temporal intermittent slowing, no seizure activity.    She has been much more anxious than usual. After shoulder surgery she had diffuse tremors and anxiety. This is not typical for her.     Mood: slightly more irritable than prior to starting Keppra      3/10/2025     1:40 PM 1/29/2025     3:40 PM 8/14/2024    10:20 AM 7/5/2024     8:40 AM 8/7/2023     9:45 AM   PHQ-9 Screening   Little interest or pleasure in doing things 2 - more than half the days 1 - several days 0 - not at all 0 - not at all 0 - not at all   Feeling down, depressed, or hopeless 2 - more than half the days 0 - not at all 0 - not at all 0 - not at all 0 - not at all   Trouble falling or staying asleep, or sleeping too much 2 - more than half the days 3 - nearly every day      Feeling tired or having little energy 2 - more than half the days 1 - several days      Poor appetite or overeating 2 - more than half the days 0 - not at all      Feeling bad about yourself - or  that you are a failure or have let yourself or your family down 0 - not at all 0 - not at all      Trouble concentrating on things, such as reading the newspaper or watching television 1 - several days 1 - several days      Moving or speaking so slowly that other people could have noticed. Or the opposite - being so fidgety or restless that you have been moving around a lot more than usual 0 - not at all 0 - not at all      Thoughts that you would be better off dead, or of hurting yourself in some way 0 - not at all 0 - not at all      PHQ-2 Total Score 4 1 0 0 0   PHQ-9 Total Score 11 6              Current Medications:   Current Outpatient Medications:     Lacosamide 50 MG/5ML Solution, Take 50 mg by mouth 2 times a day for 7 days, THEN 100 mg 2 times a day for 30 days., Disp: 670 mL, Rfl: 0    [START ON 4/11/2025] Lacosamide 100 MG/10ML Solution, Take 100 mg by mouth 2 times a day for 90 days. Start after finishing first uptitration doses  Indications: Focal Epilepsy, Disp: 1800 mL, Rfl: 0    Multiple Vitamins-Minerals (PRESERVISION AREDS PO), Take  by mouth every day., Disp: , Rfl:     levETIRAcetam (KEPPRA) 100 MG/ML Solution, Take 7.5 mL by mouth every 12 hours for 180 days., Disp: 1350 mL, Rfl: 1    pantoprazole (PROTONIX) 20 MG tablet, Take 1 Tablet by mouth every day., Disp: 90 Tablet, Rfl: 3    levothyroxine (SYNTHROID) 100 MCG Tab, Take 1 Tablet by mouth every morning on an empty stomach., Disp: 100 Tablet, Rfl: 3    atorvastatin (LIPITOR) 10 MG Tab, Take 1 Tablet by mouth every day., Disp: 100 Tablet, Rfl: 3    Allergies:   Allergies   Allergen Reactions    Oxycodone Swelling     Made lips swell         Physical Exam:   Ambulatory Vitals  Vitals:    03/10/25 1317   BP: 138/60   Pulse: (!) 54   Resp: 17   SpO2: 98%       Constitutional: Well-developed, well-nourished, good hygiene. Appears stated age.  Respiratory: normal respiratory effort  Skin: Warm, dry, intact. No rashes  observed.  Neurologic:   Mental Status: Awake, alert, oriented x 4.   Speech: Fluent with normal prosody.   Memory: Able to recall recent and remote events accurately.    Concentration: Attentive. Able to focus on history.   Fund of Knowledge: Appropriate.   Cranial Nerves:    CN II: PERRL    CN III, IV, VI: EOMI without nystagmus    CN VII: No facial asymmetry    CN VIII: Hearing intact to voice   Motor: moving all extremities fully and equally    Gait: ambulates steadily without assistive device   Movements: No resting tremors or abnormal movements observed.       Studies:      Labs reviewed      Imaging:   MRI Brain - frontal bone L > R hyperostosis        EEG Results:   Ambulatory EEG 1/9/2025:  INTERPRETATION:  23 hour abnormal ambulatory EEG recording in the awake and drowsy/sleep state(s):  -Rare bursts of left temporal maximal polymorphic mild slowing. This finding is suggestive of focal cerebral dysfunction of a nonspecific etiology, which may be seen in the setting of a structural abnormality, postictally, or in other clinical scenarios. Clinical and radiologic correlation recommended.   -No definitive epileptiform discharges or other epileptiform phenomena seen.  -No seizures.   -Clinical Events: none      EKG 11/2024 reviewed, CT interval 124.           Assessment/Plan:   Yoselin Jorge is a 79 y.o. woman with a history of two lifetime seizures in < 24 hour period who is being seen in follow-up for seizures.    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 This could be related to remote history of radiation. I suspect that cortical indentation from the frontal bone could be a nidus for seizure activity.     24h ambulatory EEG showed rare bursts of L temporal slowing which could be seen in the setting of a focal epilepsy but is not epileptic in and of itself.     At last visit we discussed options of remaining on  Keppra versus starting a new medication (eg. Lamictal) to come off of Keppra. She is not tolerating the higher dose of Keppra at all currently, is interested in switching meds. Vimpat was chosen due to its faster uptitration than Lamictal.     At initial visit recommended vitamin D 2000 to 5000 international units daily for bone health with concomitant antiseizure medication use. The patient is aware to contact me if she has any events concerning for breakthrough seizures.  Discussed typical symptoms of seizure to be aware of. Counseling was provided at initial visit regarding seizure safety, risk factors for breakthrough seizures including poor sleep, stress, being ill with a fever, drugs or alcohol.  She does not use drugs or drink alcohol.       She has been seizure-free for > 3 months. DMV clearance for driving submitted today.      Exam is notable for distal impaired vibratory sensation; B12 and folate levels ordered.  She also has glucose monitoring through her primary care. B12 was low normal - consider supplementation.    Orders:      Lacosamide 100 MG/10ML Solution          Take 100 mg by mouth 2 times a day for 90 days. Start after finishing first uptitration doses Indications: Focal Epilepsy PLEASE FILL EARLY- patient is traveling to another state before next fill would be due Disp-1800 mL, R-0, Normal       Lacosamide 50 MG/5ML Solution         Take 50 mg by mouth 2 times a day for 7 days, THEN 100 mg 2 times a day for 30 days., Disp-670 mL, R-0, Normal       LACOSAMIDE         Future, Expires: 3/10/2026, Routine, Lab Collect, Resulting Agency - [facility]     Patient instructions:   Week 1:  -Decrease Keppra to 5 mL twice daily for 1 week (500 mg twice daily)  -Start Vimpat 50 mg twice daily for 1 week    Week 2:   -Increase Vimpat to 100 mg twice daily thereafter  -Stop Keppra    Week 3 (or late in week 2):  -Check a level before your AM Vimpat dose after you increase to 100 mg twice  daily        Follow up in 8 weeks.         Debra León M.D.   Diplomate, Neurology with Special Qualification in Epilepsy, American Board of Psychiatry and Neurology   of Clinical Neurology, [facility]      During today's encounter we discussed available treatment options and their individual side effect profiles. Total encounter time caring for patient today 52 minutes.

## 2025-03-10 NOTE — TELEPHONE ENCOUNTER
Received New Start request via [employer]  for Lacosamide 100 MG/10ML Solution . (Quantity:1800, Day Supply:90)     Insurance: [employer]  Member ID:  [id number]  BIN: [id number]  PCN: [id number]  Group: [facility]     Ran Test claim via [place] & medication Rejects stating prior authorization is required. Will release to pt's preferred pharmacy on file. Pt's copay is higher at the [facility] pharmacy.

## 2025-03-10 NOTE — PATIENT INSTRUCTIONS
Week 1:  -Decrease Keppra to 5 mL twice daily for 1 week (500 mg twice daily)  -Start Vimpat 50 mg twice daily for 1 week    Week 2:   -Increase Vimpat to 100 mg twice daily thereafter  -Stop Keppra    Week 3 (or late in week 2):  -Check a level before your AM Vimpat dose after you increase to 100 mg twice daily

## 2025-03-12 ENCOUNTER — RESULTS FOLLOW-UP (OUTPATIENT)
Dept: MEDICAL GROUP | Facility: LAB | Age: 80
End: 2025-03-12
Payer: MEDICARE

## 2025-03-12 DIAGNOSIS — M79.641 RIGHT HAND PAIN: ICD-10-CM

## 2025-03-27 ENCOUNTER — HOSPITAL ENCOUNTER (OUTPATIENT)
Dept: LAB | Facility: MEDICAL CENTER | Age: 80
End: 2025-03-27
Attending: STUDENT IN AN ORGANIZED HEALTH CARE EDUCATION/TRAINING PROGRAM
Payer: MEDICARE

## 2025-03-27 DIAGNOSIS — R56.9 SEIZURES (HCC): ICD-10-CM

## 2025-03-27 PROCEDURE — 80235 DRUG ASSAY LACOSAMIDE: CPT

## 2025-03-27 PROCEDURE — 36415 COLL VENOUS BLD VENIPUNCTURE: CPT

## 2025-03-29 LAB — LACOSAMIDE SERPL-MCNC: 6 UG/ML (ref 1–10)

## 2025-04-04 ENCOUNTER — TELEPHONE (OUTPATIENT)
Dept: HEALTH INFORMATION MANAGEMENT | Facility: OTHER | Age: 80
End: 2025-04-04
Payer: MEDICARE

## 2025-05-09 ASSESSMENT — ENCOUNTER SYMPTOMS: GENERAL WELL-BEING: GOOD

## 2025-05-09 ASSESSMENT — ACTIVITIES OF DAILY LIVING (ADL): BATHING_REQUIRES_ASSISTANCE: 0

## 2025-05-12 PROBLEM — K21.9 GASTROESOPHAGEAL REFLUX DISEASE WITHOUT ESOPHAGITIS: Status: ACTIVE | Noted: 2025-05-12

## 2025-05-12 PROBLEM — R29.6 UNWITNESSED FALL: Status: RESOLVED | Noted: 2024-11-24 | Resolved: 2025-05-12

## 2025-05-12 PROBLEM — G31.9 CEREBRAL ATROPHY (HCC): Status: ACTIVE | Noted: 2025-05-12

## 2025-05-12 PROBLEM — G40.909 SEIZURE DISORDER (HCC): Status: ACTIVE | Noted: 2024-11-24

## 2025-05-12 SDOH — HEALTH STABILITY: PHYSICAL HEALTH: ON AVERAGE, HOW MANY DAYS PER WEEK DO YOU ENGAGE IN MODERATE TO STRENUOUS EXERCISE (LIKE A BRISK WALK)?: 3 DAYS

## 2025-05-12 SDOH — HEALTH STABILITY: MENTAL HEALTH
STRESS IS WHEN SOMEONE FEELS TENSE, NERVOUS, ANXIOUS, OR CAN'T SLEEP AT NIGHT BECAUSE THEIR MIND IS TROUBLED. HOW STRESSED ARE YOU?: ONLY A LITTLE

## 2025-05-12 SDOH — HEALTH STABILITY: PHYSICAL HEALTH: ON AVERAGE, HOW MANY MINUTES DO YOU ENGAGE IN EXERCISE AT THIS LEVEL?: 10 MIN

## 2025-05-12 SDOH — ECONOMIC STABILITY: INCOME INSECURITY: IN THE LAST 12 MONTHS, WAS THERE A TIME WHEN YOU WERE NOT ABLE TO PAY THE MORTGAGE OR RENT ON TIME?: NO

## 2025-05-12 SDOH — ECONOMIC STABILITY: FOOD INSECURITY: WITHIN THE PAST 12 MONTHS, YOU WORRIED THAT YOUR FOOD WOULD RUN OUT BEFORE YOU GOT MONEY TO BUY MORE.: NEVER TRUE

## 2025-05-12 SDOH — ECONOMIC STABILITY: INCOME INSECURITY: HOW HARD IS IT FOR YOU TO PAY FOR THE VERY BASICS LIKE FOOD, HOUSING, MEDICAL CARE, AND HEATING?: NOT HARD AT ALL

## 2025-05-12 SDOH — ECONOMIC STABILITY: FOOD INSECURITY: WITHIN THE PAST 12 MONTHS, THE FOOD YOU BOUGHT JUST DIDN'T LAST AND YOU DIDN'T HAVE MONEY TO GET MORE.: NEVER TRUE

## 2025-05-12 SDOH — ECONOMIC STABILITY: HOUSING INSECURITY
IN THE LAST 12 MONTHS, WAS THERE A TIME WHEN YOU DID NOT HAVE A STEADY PLACE TO SLEEP OR SLEPT IN A SHELTER (INCLUDING NOW)?: NO

## 2025-05-12 ASSESSMENT — SOCIAL DETERMINANTS OF HEALTH (SDOH)
HOW OFTEN DO YOU GET TOGETHER WITH FRIENDS OR RELATIVES?: MORE THAN THREE TIMES A WEEK
IN THE PAST 12 MONTHS, HAS THE ELECTRIC, GAS, OIL, OR WATER COMPANY THREATENED TO SHUT OFF SERVICE IN YOUR HOME?: NO
IN A TYPICAL WEEK, HOW MANY TIMES DO YOU TALK ON THE PHONE WITH FAMILY, FRIENDS, OR NEIGHBORS?: MORE THAN THREE TIMES A WEEK
HOW OFTEN DO YOU ATTENT MEETINGS OF THE CLUB OR ORGANIZATION YOU BELONG TO?: PATIENT DECLINED
HOW OFTEN DO YOU ATTENT MEETINGS OF THE CLUB OR ORGANIZATION YOU BELONG TO?: PATIENT DECLINED
HOW OFTEN DO YOU HAVE SIX OR MORE DRINKS ON ONE OCCASION: NEVER
DO YOU BELONG TO ANY CLUBS OR ORGANIZATIONS SUCH AS CHURCH GROUPS UNIONS, FRATERNAL OR ATHLETIC GROUPS, OR SCHOOL GROUPS?: YES
HOW HARD IS IT FOR YOU TO PAY FOR THE VERY BASICS LIKE FOOD, HOUSING, MEDICAL CARE, AND HEATING?: NOT HARD AT ALL
WITHIN THE PAST 12 MONTHS, YOU WORRIED THAT YOUR FOOD WOULD RUN OUT BEFORE YOU GOT THE MONEY TO BUY MORE: NEVER TRUE
HOW OFTEN DO YOU ATTEND CHURCH OR RELIGIOUS SERVICES?: MORE THAN 4 TIMES PER YEAR
DO YOU BELONG TO ANY CLUBS OR ORGANIZATIONS SUCH AS CHURCH GROUPS UNIONS, FRATERNAL OR ATHLETIC GROUPS, OR SCHOOL GROUPS?: YES
HOW MANY DRINKS CONTAINING ALCOHOL DO YOU HAVE ON A TYPICAL DAY WHEN YOU ARE DRINKING: PATIENT DOES NOT DRINK
IN A TYPICAL WEEK, HOW MANY TIMES DO YOU TALK ON THE PHONE WITH FAMILY, FRIENDS, OR NEIGHBORS?: MORE THAN THREE TIMES A WEEK
HOW OFTEN DO YOU HAVE A DRINK CONTAINING ALCOHOL: NEVER
HOW OFTEN DO YOU ATTEND CHURCH OR RELIGIOUS SERVICES?: MORE THAN 4 TIMES PER YEAR
HOW OFTEN DO YOU GET TOGETHER WITH FRIENDS OR RELATIVES?: MORE THAN THREE TIMES A WEEK

## 2025-05-12 ASSESSMENT — LIFESTYLE VARIABLES
AUDIT-C TOTAL SCORE: 0
HOW OFTEN DO YOU HAVE A DRINK CONTAINING ALCOHOL: NEVER
SKIP TO QUESTIONS 9-10: 1
HOW MANY STANDARD DRINKS CONTAINING ALCOHOL DO YOU HAVE ON A TYPICAL DAY: PATIENT DOES NOT DRINK
HOW OFTEN DO YOU HAVE SIX OR MORE DRINKS ON ONE OCCASION: NEVER

## 2025-05-12 NOTE — ASSESSMENT & PLAN NOTE
Chronic, stable. Incidentally found on prior imaging in 2024. Likely age-related. Not associated with memory changes, though she was found to have a seizure disorder at the time of imaging. Completed minicog today without error. Follow-up with PCP/Neurology as previously scheduled.

## 2025-05-12 NOTE — ASSESSMENT & PLAN NOTE
Chronic, stable. Last DEXA September 2023 with very mild osteopenia. She does take calcium and vitamin D supplementation. She had a lot going on last year and never got her infusion of Reclast. Does engage in weightbearing exercise. No hx of fragility fractures. Advise rechecking DEXA September 2025. Continue to follow up with PCP as previously scheduled.

## 2025-05-12 NOTE — ASSESSMENT & PLAN NOTE
Chronic, stable. She sees ophthalmology but is not receiving intravitreal injections at this time. She had a vitrectomy in her L eye in the past. Follow up as scheduled.

## 2025-05-12 NOTE — ASSESSMENT & PLAN NOTE
Chronic, stable. Currently taking levothyroxine 100 mcg daily as prescribed. Denies complications. TSH stable.

## 2025-05-12 NOTE — ASSESSMENT & PLAN NOTE
Chronic, stable. Currently takes pantoprazole 20 mg daily. States this has well controlled any symptoms reflux. She does have difficulty swallowing after her head and neck cancer s/p radiation. Follow up with PCP for continued monitoring.

## 2025-05-12 NOTE — ASSESSMENT & PLAN NOTE
New problem, stable. She now maintains on lacosamide 100 mg BID. She transitioned off her Keppra due to side effects of fatigue, sleep disturbance, mood disturbance. She remains seizure free since her two episodes in November. No brain malignancy noted, thankfully. She is under the care of Dr. León, Neurology. Follow up with her per routine.

## 2025-05-12 NOTE — ASSESSMENT & PLAN NOTE
Chronic, stable. We discussed her dietary/lifestyle regimen. She has started eating a low inflammatory diet to hopefully help with her arthritis. Follow up with PCP for continued monitoring.  Lab Results   Component Value Date/Time    HBA1C 5.8 (H) 02/10/2025 1557    AVGLUC 120 02/10/2025 1557

## 2025-05-12 NOTE — ASSESSMENT & PLAN NOTE
Chronic, stable. She currently takes atorvastatin 10 mg daily. We discussed her dietary/lifestyle regimen. Follow up with PCP for continued monitoring and management.  Lab Results   Component Value Date/Time    CHOLSTRLTOT 163 09/20/2022 08:15 AM    TRIGLYCERIDE 75 09/20/2022 08:15 AM    HDL 58 09/20/2022 08:15 AM    LDL 90 09/20/2022 08:15 AM

## 2025-05-13 ENCOUNTER — OFFICE VISIT (OUTPATIENT)
Dept: FAMILY PLANNING/WOMEN'S HEALTH CLINIC | Facility: PHYSICIAN GROUP | Age: 80
End: 2025-05-13
Payer: MEDICARE

## 2025-05-13 VITALS
HEIGHT: 63 IN | OXYGEN SATURATION: 95 % | SYSTOLIC BLOOD PRESSURE: 136 MMHG | BODY MASS INDEX: 26.93 KG/M2 | DIASTOLIC BLOOD PRESSURE: 80 MMHG | WEIGHT: 152 LBS | HEART RATE: 68 BPM

## 2025-05-13 DIAGNOSIS — G40.909 SEIZURE DISORDER (HCC): ICD-10-CM

## 2025-05-13 DIAGNOSIS — K21.9 GASTROESOPHAGEAL REFLUX DISEASE WITHOUT ESOPHAGITIS: ICD-10-CM

## 2025-05-13 DIAGNOSIS — G31.9 CEREBRAL ATROPHY (HCC): ICD-10-CM

## 2025-05-13 DIAGNOSIS — E03.9 HYPOTHYROIDISM (ACQUIRED): ICD-10-CM

## 2025-05-13 DIAGNOSIS — R73.03 PREDIABETES: ICD-10-CM

## 2025-05-13 DIAGNOSIS — H35.3222 EXUDATIVE AGE-RELATED MACULAR DEGENERATION OF LEFT EYE WITH INACTIVE CHOROIDAL NEOVASCULARIZATION (HCC): ICD-10-CM

## 2025-05-13 DIAGNOSIS — M81.0 AGE-RELATED OSTEOPOROSIS WITHOUT CURRENT PATHOLOGICAL FRACTURE: ICD-10-CM

## 2025-05-13 DIAGNOSIS — E78.2 MIXED HYPERLIPIDEMIA: ICD-10-CM

## 2025-05-13 PROCEDURE — G0439 PPPS, SUBSEQ VISIT: HCPCS

## 2025-05-13 PROCEDURE — 3075F SYST BP GE 130 - 139MM HG: CPT

## 2025-05-13 PROCEDURE — 3079F DIAST BP 80-89 MM HG: CPT

## 2025-05-13 SDOH — ECONOMIC STABILITY: HOUSING INSECURITY: AT ANY TIME IN THE PAST 12 MONTHS, WERE YOU HOMELESS OR LIVING IN A SHELTER (INCLUDING NOW)?: NO

## 2025-05-13 SDOH — ECONOMIC STABILITY: FOOD INSECURITY: WITHIN THE PAST 12 MONTHS, THE FOOD YOU BOUGHT JUST DIDN'T LAST AND YOU DIDN'T HAVE MONEY TO GET MORE.: NEVER TRUE

## 2025-05-13 SDOH — ECONOMIC STABILITY: HOUSING INSECURITY: IN THE LAST 12 MONTHS, WAS THERE A TIME WHEN YOU WERE NOT ABLE TO PAY THE MORTGAGE OR RENT ON TIME?: NO

## 2025-05-13 SDOH — ECONOMIC STABILITY: FOOD INSECURITY: WITHIN THE PAST 12 MONTHS, YOU WORRIED THAT YOUR FOOD WOULD RUN OUT BEFORE YOU GOT THE MONEY TO BUY MORE.: NEVER TRUE

## 2025-05-13 SDOH — ECONOMIC STABILITY: FOOD INSECURITY: HOW HARD IS IT FOR YOU TO PAY FOR THE VERY BASICS LIKE FOOD, HOUSING, MEDICAL CARE, AND HEATING?: NOT HARD AT ALL

## 2025-05-13 SDOH — ECONOMIC STABILITY: TRANSPORTATION INSECURITY: IN THE PAST 12 MONTHS, HAS LACK OF TRANSPORTATION KEPT YOU FROM MEDICAL APPOINTMENTS OR FROM GETTING MEDICATIONS?: NO

## 2025-05-13 SDOH — ECONOMIC STABILITY: HOUSING INSECURITY: IN THE PAST 12 MONTHS, HOW MANY TIMES HAVE YOU MOVED WHERE YOU WERE LIVING?: 1

## 2025-05-13 ASSESSMENT — PATIENT HEALTH QUESTIONNAIRE - PHQ9: CLINICAL INTERPRETATION OF PHQ2 SCORE: 0

## 2025-05-13 ASSESSMENT — FIBROSIS 4 INDEX: FIB4 SCORE: 1.66

## 2025-05-13 ASSESSMENT — ACTIVITIES OF DAILY LIVING (ADL): LACK_OF_TRANSPORTATION: NO

## 2025-05-13 NOTE — PROGRESS NOTES
Comprehensive Health Assessment Program     EvelynMeka Jorge is a 79 y.o. here for her comprehensive health assessment.    Patient Active Problem List    Diagnosis Date Noted    Cerebral atrophy (HCC) 05/12/2025    Gastroesophageal reflux disease without esophagitis 05/12/2025    Rotator cuff tear 01/22/2025    Subacromial bursitis 01/22/2025    Nontraumatic rupture of right proximal biceps tendon 01/22/2025    Acromioclavicular arthrosis 01/22/2025    Superior glenoid labrum lesion of left shoulder 01/22/2025    Age-related osteoporosis without current pathological fracture 12/13/2024    Hypotension 11/25/2024    Seizure disorder (HCC) 11/24/2024    Fracture Risk Assessment Score (FRAX) indicating greater than 3% risk for hip fracture 10/02/2023    Fracture Risk Assessment Score (FRAX) indicating greater than 20% risk for major osteoporosis-related fracture 10/02/2023    Overweight with body mass index (BMI) of 29 to 29.9 in adult 08/07/2023    BMI 29.0-29.9,adult 09/15/2022    Insomnia 11/09/2020    Exudative age-related macular degeneration of left eye with inactive choroidal neovascularization (HCC) 07/27/2020    Menopause 07/27/2020    Prediabetes 05/11/2017    S/P thyroidectomy 05/22/2015    S/P dilatation of esophageal stricture 05/16/2015    Swallowing difficulty 01/13/2015    Hypothyroidism (acquired) 01/13/2015    Osteopenia of multiple sites 03/30/2013    Hearing loss 03/17/2013    Mixed hyperlipidemia 03/11/2013    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 Start after finishing first uptitration doses  Indications: Focal Epilepsy 1800 mL 0    Multiple Vitamins-Minerals (PRESERVISION AREDS PO) Take  by mouth every day.      pantoprazole  (PROTONIX) 20 MG tablet Take 1 Tablet by mouth every day. 90 Tablet 3    levothyroxine (SYNTHROID) 100 MCG Tab Take 1 Tablet by mouth every morning on an empty stomach. 100 Tablet 3    atorvastatin (LIPITOR) 10 MG Tab Take 1 Tablet by mouth every day. 100 Tablet 3     No current facility-administered medications for this visit.          Current supplements as per medication list.     Allergies:   Oxycodone  Social History     Tobacco Use    Smoking status: Never    Smokeless tobacco: Never   Vaping Use    Vaping status: Never Used   Substance Use Topics    Alcohol use: Not Currently     Alcohol/week: 0.6 oz     Types: 1 Glasses of wine per week     Comment: on rare ocassion    Drug use: No     Family History   Problem Relation Age of Onset    Diabetes Father     Hypertension Father     Hyperlipidemia Father     Stroke Father     Lung Disease Mother 68        Emphazema    Heart Disease Mother         CHF    Cancer Brother 55        liver cancer    Other Brother         Melanoma    Hypertension Brother     Alcohol abuse Brother     Cancer Brother 55        Prostate cancer    Hypertension Brother     Heart Disease Maternal Grandmother 63     Yoselin Ackerman  has a past medical history of Arthritis, Cancer (HCC) (2007), Dental disorder, Disorder of thyroid, Epigastric abdominal pain of unknown etiology (07/12/2019), Exudative age-related macular degeneration of left eye with active choroidal neovascularization (HCC) (07/27/2020), Family history of melanoma (03/11/2013), Flat foot(734) (03/17/2013), Heart burn, Herniated cervical disc, Hiatus hernia syndrome, High cholesterol, History of nasopharyngeal cancer (03/11/2013), Hyperlipidemia (03/11/2013), Impaired fasting glucose (03/17/2013), Indigestion, Menopause (07/27/2020), Other lymphedema (03/11/2013), PONV (postoperative nausea and vomiting), Prediabetes (05/11/2017), Rib pain on right side (07/31/2018), S/P dilatation of esophageal stricture (05/16/2015), S/P  thyroidectomy (05/22/2015), Seizure (HCC) (11/2024), SENSORINEURAL HEARING LOSS (03/17/2013), Shoulder pain (2014), Swallowing impaired, Unspecified cataract, Unspecified urinary incontinence, and Unwitnessed fall (11/24/2024).   Past Surgical History:   Procedure Laterality Date    SHOULDER ARTHROSCOPY W/ ROTATOR CUFF REPAIR Left 02/11/2025    Procedure: Left shoulder arthroscopic rotator cuff repair, biceps tenodesis, subacromial decompression, distal clavicle resection;  Surgeon: Cain Gerardo M.D.;  Location: SURGERY [facility];  Service: Orthopedics    VITRECTOMY POSTERIOR Left 12/17/2019    Procedure: REMOVAL, VITREOUS BODY, POSTERIOR PORTION-MEMBRANE PEEL POSSIBLE LASER GAS OR OIL;  Surgeon: Arlyn Dawson M.D.;  Location: SURGERY SAME DAY [facility];  Service: Ophthalmology    THYROIDECTOMY TOTAL  11/19/2014    Performed by Lukas De Santiago M.D. at SURGERY SAME DAY [facility]    BICEPS TENODESIS  05/22/2014    Performed by Cain Gerardo M.D. at SURGERY [facility]    SHOULDER ARTHROSCOPY W/ ROTATOR CUFF REPAIR  05/22/2014    Performed by Cain Gerardo M.D. at [facility]    SHOULDER DECOMPRESSION ARTHROSCOPIC  05/22/2014    Performed by Cain Gerardo M.D. at [facility] ORS    CLAVICLE DISTAL EXCISION  05/22/2014    Performed by Cain Gerardo M.D. at [facility]    TENDON RELEASE FOOT  08/2013    [facility]     OTHER ORTHOPEDIC SURGERY  2006    reconstruction  left foot    BUNIONECTOMY  1988    CHOLECYSTECTOMY  1988    TUBAL LIGATION  1982    CATARACT PHACO WITH IOL      Ry    COLONOSCOPY      EGD ESOPHAGUS WITH ENDOSCOPIC US      EYE SURGERY      FOOT SURGERY  O7/03/2006    Flat foot reconstruction    SHOULDER SURGERY  05/22/2014    Rotator cuff    TONSILLECTOMY      TUBAL COAGULATION LAPAROSCOPIC BILATERAL      US-NEEDLE CORE BX-BREAST PANEL         Screening:  In the last six months have you experienced any leakage of urine? Yes she waits too long to get to  the restroom. She feels it is getting better. She wears a pad.     Depression Screening  Little interest or pleasure in doing things?  0 - not at all  Feeling down, depressed , or hopeless? 0 - not at all  Patient Health Questionnaire Score: 0     If depressive symptoms identified deferred to follow up visit unless specifically addressed in assessment and plan.    Interpretation of PHQ-9 Total Score   Score Severity   1-4 No Depression   5-9 Mild Depression   10-14 Moderate Depression   15-19 Moderately Severe Depression   20-27 Severe Depression    Screening for Cognitive Impairment  Do you or any of your friends or family members have any concern about your memory? No  Three Minute Recall (Village, Kitchen, Baby) 3/3    Zach clock face with all 12 numbers and set the hands to show 10 minutes past 11.  Yes    Cognitive concerns identified deferred for follow up unless specifically addressed in assessment and plan.    Fall Risk Assessment  Has the patient had two or more falls in the last year or any fall with injury in the last year?  No    Safety Assessment  Do you always wear your seatbelt?  Yes  Any changes to home needed to function safely? No  Difficulty hearing.  Yes wears HA  Patient counseled about all safety risks that were identified.    Functional Assessment ADLs  Are there any barriers preventing you from cooking for yourself or meeting nutritional needs?  No.    Are there any barriers preventing you from driving safely or obtaining transportation?  No.    Are there any barriers preventing you from using a telephone or calling for help?  No    Are there any barriers preventing you from shopping?  No.    Are there any barriers preventing you from taking care of your own finances?  No    Are there any barriers preventing you from managing your medications?  No    Are there any barriers preventing you from showering, bathing or dressing yourself? No    Are there any barriers preventing you from doing  housework or laundry? No  Are there any barriers preventing you from using the toilet?No  Are you currently engaging in any exercise or physical activity?  Yes.  She is doing PT for shoulder. She just joined the gym.    Self-Assessment of Health  What is your perception of your health? Good    Do you sleep more than six hours a night? Yes  Much better since stopping her Keppra.   In the past 7 days, how much did pain keep you from doing your normal work? None    Do you spend quality time with family or friends (virtually or in person)? Yes    Do you usually eat a heart healthy diet that constists of a variety of fruits, vegetables, whole grains and fiber? Yes  She has been following an anti-inflammatory diet to self-treat her arthritis in her hands.  Do you eat foods high in fat and/or Fast Food more than three times per week? No    How concerned are you that your medical conditions are not being well managed? Not at all    Are you worried that in the next 2 months, you may not have stable housing that you own, rent, or stay in as part of a household? No      Advance Care Planning  Do you have an Advance Directive, Living Will, Durable Power of , or POLST? Yes                 Health Maintenance Summary            Needs Review       Hepatitis C Screening  Tentatively Complete      05/04/2015  Hepatitis C Antibody component of HEP C VIRUS ANTIBODY                      Upcoming       Bone Density Scan (Every 2 Years) Next due on 9/22/2025 09/22/2023  DS-BONE DENSITY STUDY (DEXA)    11/09/2022  DS-BONE DENSITY STUDY (DEXA)    08/27/2020  DS-BONE DENSITY STUDY (DEXA)    11/14/2017  DS-BONE DENSITY STUDY (DEXA)    04/28/2015  DS-BONE DENSITY STUDY (DEXA)     Only the first 5 history entries have been loaded, but more history exists.            Annual Wellness Visit (Yearly) Next due on 5/13/2026 05/13/2025  Level of Service: AL ANNUAL WELLNESS VISIT-INCLUDES PPPS SUBSEQUE*    08/14/2024  Level of  Service: UT ANNUAL WELLNESS VISIT-INCLUDES PPPS SUBSEQUE*    08/07/2023  Level of Service: UT ANNUAL WELLNESS VISIT-INCLUDES PPPS SUBSEQUE*    09/15/2022  Level of Service: UT ANNUAL WELLNESS VISIT-INCLUDES PPPS SUBSEQUE*    11/01/2021  Subsequent Annual Wellness Visit - Includes PPPS ()      Only the first 5 history entries have been loaded, but more history exists.              IMM DTaP/Tdap/Td Vaccine (3 - Td or Tdap) Next due on 11/9/2030 11/09/2020  Imm Admin: Tdap Vaccine    10/27/2009  Imm Admin: Tdap Vaccine                      Completed or No Longer Recommended       Influenza Vaccine (Series Information) Completed      09/12/2024  Outside Immunization: Influenza Tri, Adj    09/29/2023  Outside Immunization: Influenza Quad Adjuvanted    09/26/2022  Imm Admin: Influenza, Unspecified - HISTORICAL DATA    09/17/2021  Imm Admin: Influenza, Unspecified - HISTORICAL DATA    09/14/2021  Imm Admin: Influenza Vaccine Quad Inj (Pf)      Only the first 5 history entries have been loaded, but more history exists.              Zoster (Shingles) Vaccines (Series Information) Completed      07/27/2020  Imm Admin: Zoster Vaccine Recombinant (RZV) (SHINGRIX)    02/19/2020  Imm Admin: Zoster Vaccine Recombinant (RZV) (SHINGRIX)    10/24/2011  Imm Admin: Zoster Vaccine Live (ZVL) (Zostavax) - HISTORICAL DATA              COVID-19 Vaccine (Series Information) Completed      04/03/2025  Imm Admin: COVID-19, mRNA, LNP-S, PF, rachid-sucrose, 30 mcg/0.3 mL    09/11/2024  Imm Admin: COVID-19, mRNA, LNP-S, PF, rachid-sucrose, 30 mcg/0.3 mL    04/13/2024  Imm Admin: Comirnaty (Covid-19 Vaccine, Mrna, 2694-2854 Formula)    09/29/2023  Imm Admin: Comirnaty (Covid-19 Vaccine, Mrna, 0023-2569 Formula)    09/14/2022  Imm Admin: PFIZER BIVALENT SARS-COV-2 VACCINE (12+)      Only the first 5 history entries have been loaded, but more history exists.              Pneumococcal Vaccine: 50+ Years (Series Information) Completed       10/24/2017  Imm Admin: Pneumococcal polysaccharide vaccine (PPSV-23)    10/16/2014  Imm Admin: Pneumococcal Conjugate Vaccine (Prevnar/PCV-13)              Hepatitis A Vaccine (Hep A) (Series Information) Aged Out      No completion history exists for this topic.              Hepatitis B Vaccine (Hep B) (Series Information) Aged Out     No completion history exists for this topic.              HPV Vaccines (Series Information) Aged Out     No completion history exists for this topic.              Polio Vaccine (Inactivated Polio) (Series Information) Aged Out     No completion history exists for this topic.              Meningococcal Immunization (Series Information) Aged Out     No completion history exists for this topic.              Mammogram  Discontinued        Frequency changed to Never automatically (Topic No Longer Applies)    01/18/2023  MA-SCREENING MAMMO BILAT W/TOMOSYNTHESIS W/CAD    10/01/2021  MA-SCREENING MAMMO BILAT W/TOMOSYNTHESIS W/CAD    08/27/2020  MA-SCREENING MAMMO BILAT W/TOMOSYNTHESIS W/CAD    05/20/2019  MA-SCREENING MAMMO BILAT W/TOMOSYNTHESIS W/CAD     Only the first 5 history entries have been loaded, but more history exists.            Colorectal Cancer Screening  Discontinued        Frequency changed to Never automatically (Topic No Longer Applies)    01/05/2016  AMB REFERRAL TO GI FOR COLONOSCOPY                            Patient Care Team:  Brian Cutler M.D. as PCP - General (Family Medicine)  Slick Pickett O.D. as Consulting Physician (Optometry)  Raimundo Love DDS as Consulting Physician (Dentistry)  Jorge L Weinstein as Consulting Physician (Orthopedic Surgery)  Mitchel Manuel M.D. as Consulting Physician (Otolaryngology)  Arlyn Dawson M.D. as Consulting Physician (Ophthalmology)  Osiel Pizarro M.D. (Gastroenterology)  Ana Mobley C.N.A. as [employer] [facility]       Financial Resource Strain: Low Risk  (5/13/2025)    Overall Financial  "Resource Strain (CARDIA)     Difficulty of Paying Living Expenses: Not hard at all      Transportation Needs: No Transportation Needs (5/13/2025)    PRAPARE - Transportation     Lack of Transportation (Medical): No     Lack of Transportation (Non-Medical): No      Food Insecurity: No Food Insecurity (5/13/2025)    Hunger Vital Sign     Worried About Running Out of Food in the Last Year: Never true     Ran Out of Food in the Last Year: Never true        Encounter Vitals  Blood Pressure : 136/80  Pulse: 68  Pulse Oximetry: 95 %  Weight: 68.9 kg (152 lb)  Height: 160 cm (5' 3\")  BMI (Calculated): 26.93     Physical Exam  Constitutional:       General: She is not in acute distress.     Appearance: Normal appearance.   HENT:      Head: Normocephalic and atraumatic.   Eyes:      Extraocular Movements: Extraocular movements intact.      Pupils: Pupils are equal, round, and reactive to light.   Cardiovascular:      Rate and Rhythm: Normal rate and regular rhythm.      Heart sounds: Normal heart sounds.   Pulmonary:      Breath sounds: Normal breath sounds.   Musculoskeletal:      Right lower leg: No edema.      Left lower leg: No edema.   Neurological:      Mental Status: She is alert and oriented to person, place, and time.   Psychiatric:         Mood and Affect: Mood normal.         Behavior: Behavior normal.       Assessment and Plan. The following treatment and monitoring plan is recommended:  Age-related osteoporosis without current pathological fracture  Chronic, stable. Last DEXA September 2023 with very mild osteopenia. She does take calcium and vitamin D supplementation. She had a lot going on last year and never got her infusion of Reclast. Does engage in weightbearing exercise. No hx of fragility fractures. Advise rechecking DEXA September 2025. Continue to follow up with PCP as previously scheduled.      Exudative age-related macular degeneration of left eye with inactive choroidal neovascularization " (HCC)  Chronic, stable. She sees ophthalmology but is not receiving intravitreal injections at this time. She had a vitrectomy in her L eye in the past. Follow up as scheduled.     Hypothyroidism (acquired)  Chronic, stable. Currently taking levothyroxine 100 mcg daily as prescribed. Denies complications. TSH stable.    Mixed hyperlipidemia  Chronic, stable. She currently takes atorvastatin 10 mg daily. We discussed her dietary/lifestyle regimen. Follow up with PCP for continued monitoring and management.  Lab Results   Component Value Date/Time    CHOLSTRLTOT 163 09/20/2022 08:15 AM    TRIGLYCERIDE 75 09/20/2022 08:15 AM    HDL 58 09/20/2022 08:15 AM    LDL 90 09/20/2022 08:15 AM         Prediabetes  Chronic, stable. We discussed her dietary/lifestyle regimen. She has started eating a low inflammatory diet to hopefully help with her arthritis. Follow up with PCP for continued monitoring.  Lab Results   Component Value Date/Time    HBA1C 5.8 (H) 02/10/2025 1557    AVGLUC 120 02/10/2025 1557         Seizure disorder (HCC)  New problem, stable. She now maintains on lacosamide 100 mg BID. She transitioned off her Keppra due to side effects of fatigue, sleep disturbance, mood disturbance. She remains seizure free since her two episodes in November. No brain malignancy noted, thankfully. She is under the care of Dr. León, Neurology. Follow up with her per routine.    Cerebral atrophy (HCC)  Chronic, stable. Incidentally found on prior imaging in 2024. Likely age-related. Not associated with memory changes, though she was found to have a seizure disorder at the time of imaging. Completed minicog today without error. Follow-up with PCP/Neurology as previously scheduled.       Gastroesophageal reflux disease without esophagitis  Chronic, stable. Currently takes pantoprazole 20 mg daily. States this has well controlled any symptoms reflux. She does have difficulty swallowing after her head and neck cancer s/p radiation.  Follow up with PCP for continued monitoring.      Services suggested: No services needed at this time  Health Care Screening: Age-appropriate preventive services recommended by USPTF and ACIP covered by Medicare were discussed today. Services ordered if indicated and agreed upon by the patient.  Referrals offered: Community-based lifestyle interventions to reduce health risks and promote self-management and wellness, fall prevention, nutrition, physical activity, tobacco-use cessation, weight loss, and mental health services as per orders if indicated.    Discussion today about general wellness and lifestyle habits:    Prevent falls and reduce trip hazards; Cautioned about securing or removing rugs.  Have a working fire alarm and carbon monoxide detector.  Engage in regular physical activity and social activities.    Follow-up: No follow-ups on file.

## 2025-05-14 ENCOUNTER — OFFICE VISIT (OUTPATIENT)
Dept: NEUROLOGY | Facility: MEDICAL CENTER | Age: 80
End: 2025-05-14
Attending: STUDENT IN AN ORGANIZED HEALTH CARE EDUCATION/TRAINING PROGRAM
Payer: MEDICARE

## 2025-05-14 ENCOUNTER — TELEPHONE (OUTPATIENT)
Dept: PHARMACY | Facility: MEDICAL CENTER | Age: 80
End: 2025-05-14

## 2025-05-14 VITALS
DIASTOLIC BLOOD PRESSURE: 70 MMHG | RESPIRATION RATE: 14 BRPM | WEIGHT: 156.97 LBS | OXYGEN SATURATION: 97 % | HEART RATE: 74 BPM | SYSTOLIC BLOOD PRESSURE: 162 MMHG | HEIGHT: 63 IN | BODY MASS INDEX: 27.81 KG/M2 | TEMPERATURE: 97 F

## 2025-05-14 DIAGNOSIS — R03.0 ELEVATED BLOOD PRESSURE READING: ICD-10-CM

## 2025-05-14 DIAGNOSIS — R56.9 SEIZURES (HCC): Primary | ICD-10-CM

## 2025-05-14 PROCEDURE — 3077F SYST BP >= 140 MM HG: CPT | Performed by: STUDENT IN AN ORGANIZED HEALTH CARE EDUCATION/TRAINING PROGRAM

## 2025-05-14 PROCEDURE — 99213 OFFICE O/P EST LOW 20 MIN: CPT | Performed by: STUDENT IN AN ORGANIZED HEALTH CARE EDUCATION/TRAINING PROGRAM

## 2025-05-14 PROCEDURE — 3078F DIAST BP <80 MM HG: CPT | Performed by: STUDENT IN AN ORGANIZED HEALTH CARE EDUCATION/TRAINING PROGRAM

## 2025-05-14 RX ORDER — LACOSAMIDE 10 MG/ML
100 SOLUTION ORAL 2 TIMES DAILY
Qty: 1800 ML | Refills: 1 | Status: SHIPPED | OUTPATIENT
Start: 2025-05-14 | End: 2025-11-10

## 2025-05-14 ASSESSMENT — FIBROSIS 4 INDEX: FIB4 SCORE: 1.66

## 2025-05-14 ASSESSMENT — PATIENT HEALTH QUESTIONNAIRE - PHQ9: CLINICAL INTERPRETATION OF PHQ2 SCORE: 0

## 2025-05-14 NOTE — TELEPHONE ENCOUNTER
Received [employer]  request via [employer]  for Lacosamide 100 MG/10ML Solution . (Quantity:1800, Day Supply:90)     Insurance: [employer]  Member ID:  [id number]  BIN: [id number]  PCN: [id number]  Group: [id number]     Ran Test claim via [place] & medication [facility] - will release to pt's preferred pharmacy on file.

## 2025-05-14 NOTE — PROGRESS NOTES
"[facility]  Follow up visit    Patient name: Yoselin Jorge  YOB: 1945  MRN: [id number]  Date of visit: 5/14/2025        Background:  Yoselin Jorge is a 79 y.o. woman being seen in follow up for seizures. Last visit 3/10/2025.       Details from initial visit 12/10/2024  The events of concern occurred on 11/24/2024.     She was found at home on the floor by her  in their bedroom. She had been cleaning the bathroom. She seemed to have gone back into the bedroom and fell onto carpeted area. When her  went to her, she smiled at him and told him she took a nap on the floor. He helped her to sit up and she sat for a few minutes, then he helped her up and onto a chair. He told her they need to go to the ED to figure out why she fell. He was not with her immediately after the event, is unsure how long she was down for. She bit the bottom of her lip, no tongue biting. No urinary incontinence.      They went to the ED at [facility]. She then had a witnessed seizure in the ED. Her  notes that she had full body convulsions with wide eyes, mouth was open. She was given medication to abort the seizure, stopped after a couple of minutes. She was taken to a room and appeared confused afterwards, was sluggish and slow to answer questions.     She has no recollection of either episode.      She had an MRI and an EEG which were both unremarkable. She does not remember having the EEG done.     She has been taking Keppra 500 mg twice daily.     She has not had any staring spells, abnormal sensation, confusional episodes, epigastric rising, or other transient symptoms that raise concern for seizures.      Since the seizures, feels \"foggy\" mentally and aches all over.      Her blood pressures were very labile in the hospital. She reports taht her BP went up to 200 and there is a nursing note indicating that she had a BP of 81/39 at one point during her hospitalization. 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      Reports having more stress than usual this year. This year she has had a lot of dental work. She recently underwent yearly esophageal dilation. She is unable to swallow big tablets, anything bigger than a baby aspirin.     She has nighttime leg cramping. This has been intermittent for 2 years. It can start at the hip down to the toes. This wakes her up 2-3 times per night. She uses a topical cream to help this.      She has not been driving.      She has felt off balance since having the seizures. No falls. She missed a bottom step about a year ago and had a lower back compression fracture. No issues since then.     Previous medication trials: Keppra- fatigue and mood changes.       Interval history:  She is accompanied to clinic by her .     No interval seizures.     She is doing well since transitioning off of Keppra onto Vimpat 100 mg BID. No significant side effects.     BP today was elevated which is highly unusual for the patient. She has an appointment with her primary care physician tomorrow.     Mood: much better off of Keppra      5/14/2025    10:20 AM 5/13/2025    10:20 AM 5/9/2025     5:13 PM 3/10/2025     1:40 PM 1/29/2025     3:40 PM   PHQ-9 Screening   Little interest or pleasure in doing things 0 - not at all 0 - not at all 0 - not at all 2 - more than half the days 1 - several days   Feeling down, depressed, or hopeless 0 - not at all 0 - not at all 0 - not at all 2 - more than half the days 0 - not at all   Trouble falling or staying asleep, or sleeping too much    2 - more than half the days 3 - nearly every day   Feeling tired or having little energy    2 - more than half the days 1 - several days   Poor appetite or overeating    2 - more than half the days 0 - not at all   Feeling bad about yourself - or that you are a failure or have let  yourself or your family down    0 - not at all 0 - not at all   Trouble concentrating on things, such as reading the newspaper or watching television    1 - several days 1 - several days   Moving or speaking so slowly that other people could have noticed. Or the opposite - being so fidgety or restless that you have been moving around a lot more than usual    0 - not at all 0 - not at all   Thoughts that you would be better off dead, or of hurting yourself in some way    0 - not at all 0 - not at all   PHQ-2 Total Score 0 0  4 1   PHQ-9 Total Score    11 6           Current Medications:   Current Outpatient Medications:     Lacosamide 100 MG/10ML Solution, Take 100 mg by mouth 2 times a day for 180 days. Indications: Focal Epilepsy, Disp: 1800 mL, Rfl: 1    Multiple Vitamins-Minerals (CENTRUM ADULT 50+ MULTIGUMMIES PO), , Disp: , Rfl:     Multiple Vitamins-Minerals (PRESERVISION AREDS PO), Take  by mouth every day., Disp: , Rfl:     pantoprazole (PROTONIX) 20 MG tablet, Take 1 Tablet by mouth every day., Disp: 90 Tablet, Rfl: 3    levothyroxine (SYNTHROID) 100 MCG Tab, Take 1 Tablet by mouth every morning on an empty stomach., Disp: 100 Tablet, Rfl: 3    atorvastatin (LIPITOR) 10 MG Tab, Take 1 Tablet by mouth every day. (Patient not taking: Reported on 5/14/2025), Disp: 100 Tablet, Rfl: 3    Allergies:   Allergies   Allergen Reactions    Oxycodone Swelling     Made lips swell         Physical Exam:   Ambulatory Vitals  Vitals:    05/14/25 1032   BP: (!) 162/70   Pulse: 74   Resp: 14   Temp: 36.1 °C (97 °F)   SpO2: 97%         Constitutional: Well-developed, well-nourished, good hygiene. Appears stated age.  Respiratory: normal respiratory effort  Skin: Warm, dry, intact. No rashes observed.  Neurologic:   Mental Status: Awake, alert, oriented x 4.   Speech: Fluent with normal prosody.   Memory: Able to recall recent and remote events accurately.    Concentration: Attentive. Able to focus on history.   Fund of  Knowledge: Appropriate.   Cranial Nerves:    CN II: PERRL    CN III, IV, VI: EOMI without nystagmus    CN VII: No facial asymmetry    CN VIII: Hearing intact to voice   Motor: moving all extremities fully and equally    Gait: ambulates steadily without assistive device   Movements: No resting tremors or abnormal movements observed.       Studies:      Labs reviewed      Component  Ref Range & Units (hover) 1 mo ago   Lacosamide, Serum or Plasma 6.0   Comment: INTERPRETIVE INFORMATION: Lacosamide, Serum  Therapeutic Range: 1.0 - 10.0 ug/mL  Toxic: >=20.0 ug/mL          Imaging:   MRI Brain - frontal bone L > R hyperostosis        EEG Results:   Ambulatory EEG 1/9/2025:  INTERPRETATION:  23 hour abnormal ambulatory EEG recording in the awake and drowsy/sleep state(s):  -Rare bursts of left temporal maximal polymorphic mild slowing. This finding is suggestive of focal cerebral dysfunction of a nonspecific etiology, which may be seen in the setting of a structural abnormality, postictally, or in other clinical scenarios. Clinical and radiologic correlation recommended.   -No definitive epileptiform discharges or other epileptiform phenomena seen.  -No seizures.   -Clinical Events: none      EKG 11/2024 reviewed previously, MS interval 124.           Assessment/Plan:   Yoselin Jorge is a 79 y.o. woman with a history of two lifetime seizures in < 24 hour period who is being seen in follow-up for seizures.    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 This could be related to remote history of radiation. I suspect that cortical indentation from the frontal bone could be a nidus for seizure activity.     24h ambulatory EEG showed rare bursts of L temporal slowing which could be seen in the setting of a focal epilepsy but is not epileptic in and of itself.     She has done much better on Vimpat after transitioning from Keppra.  Keppra cause significant mood side effects as well as fatigue.     At initial visit recommended vitamin D 2000 to 5000 international units daily for bone health with concomitant antiseizure medication use. The patient is aware to contact me if she has any events concerning for breakthrough seizures.  Discussed typical symptoms of seizure to be aware of. Counseling was provided at initial visit regarding seizure safety, risk factors for breakthrough seizures including poor sleep, stress, being ill with a fever, drugs or alcohol.  She does not use drugs or drink alcohol.       She is able to drive given sustained seizure freedom. DMV form was submitted previously.      Exam is notable for distal impaired vibratory sensation; B12 and folate levels ordered.  She also has glucose monitoring through her primary care. B12 was low normal - consider supplementation.    BP reading today was elevated. Counseled patient to take it at home daily. PCP appointment is scheduled for tomorrow. Her usual BP is 110s-120s/60s. Denies headache, vision changes, or any other symptoms. Counseled her on symptoms of hypertensive urgency and she is aware to go to the ED if she ever develops abnormal/acute symptoms.       Follow up in 6 months.         Debra León M.D.   Diplomate, Neurology with Special Qualification in Epilepsy, American Board of Psychiatry and Neurology   of Clinical Neurology, [employer]      During today's encounter we discussed available treatment options and their individual side effect profiles. Total encounter time caring for patient today 24 minutes.

## 2025-05-15 ENCOUNTER — OFFICE VISIT (OUTPATIENT)
Dept: MEDICAL GROUP | Facility: LAB | Age: 80
End: 2025-05-15
Payer: MEDICARE

## 2025-05-15 VITALS
TEMPERATURE: 97.6 F | SYSTOLIC BLOOD PRESSURE: 126 MMHG | DIASTOLIC BLOOD PRESSURE: 64 MMHG | HEIGHT: 63 IN | HEART RATE: 59 BPM | RESPIRATION RATE: 12 BRPM | OXYGEN SATURATION: 95 % | WEIGHT: 154 LBS | BODY MASS INDEX: 27.29 KG/M2

## 2025-05-15 DIAGNOSIS — Z13.6 SCREENING FOR CARDIOVASCULAR CONDITION: ICD-10-CM

## 2025-05-15 DIAGNOSIS — K21.9 GASTROESOPHAGEAL REFLUX DISEASE WITHOUT ESOPHAGITIS: ICD-10-CM

## 2025-05-15 DIAGNOSIS — M81.0 AGE-RELATED OSTEOPOROSIS WITHOUT CURRENT PATHOLOGICAL FRACTURE: ICD-10-CM

## 2025-05-15 DIAGNOSIS — R73.09 ELEVATED HEMOGLOBIN A1C: ICD-10-CM

## 2025-05-15 DIAGNOSIS — Z91.89 FRACTURE RISK ASSESSMENT SCORE (FRAX) INDICATING GREATER THAN 3% RISK FOR HIP FRACTURE: ICD-10-CM

## 2025-05-15 DIAGNOSIS — H91.93 BILATERAL HEARING LOSS, UNSPECIFIED HEARING LOSS TYPE: ICD-10-CM

## 2025-05-15 DIAGNOSIS — G40.909 SEIZURE DISORDER (HCC): ICD-10-CM

## 2025-05-15 DIAGNOSIS — Z85.819 HISTORY OF NASOPHARYNGEAL CANCER: ICD-10-CM

## 2025-05-15 DIAGNOSIS — Z00.00 ANNUAL PHYSICAL EXAM: Primary | ICD-10-CM

## 2025-05-15 DIAGNOSIS — Z12.31 ENCOUNTER FOR SCREENING MAMMOGRAM FOR MALIGNANT NEOPLASM OF BREAST: ICD-10-CM

## 2025-05-15 DIAGNOSIS — E78.2 MIXED HYPERLIPIDEMIA: ICD-10-CM

## 2025-05-15 DIAGNOSIS — Z91.89 FRACTURE RISK ASSESSMENT SCORE (FRAX) INDICATING GREATER THAN 20% RISK FOR MAJOR OSTEOPOROSIS-RELATED FRACTURE: ICD-10-CM

## 2025-05-15 DIAGNOSIS — Z98.890 S/P DILATATION OF ESOPHAGEAL STRICTURE: ICD-10-CM

## 2025-05-15 DIAGNOSIS — Z87.19 S/P DILATATION OF ESOPHAGEAL STRICTURE: ICD-10-CM

## 2025-05-15 DIAGNOSIS — E03.9 HYPOTHYROIDISM (ACQUIRED): ICD-10-CM

## 2025-05-15 PROBLEM — S43.432A SUPERIOR GLENOID LABRUM LESION OF LEFT SHOULDER: Status: RESOLVED | Noted: 2025-01-22 | Resolved: 2025-05-15

## 2025-05-15 PROBLEM — M75.50 SUBACROMIAL BURSITIS: Status: RESOLVED | Noted: 2025-01-22 | Resolved: 2025-05-15

## 2025-05-15 PROBLEM — E66.3 OVERWEIGHT WITH BODY MASS INDEX (BMI) OF 29 TO 29.9 IN ADULT: Status: RESOLVED | Noted: 2023-08-07 | Resolved: 2025-05-15

## 2025-05-15 PROBLEM — M75.100 ROTATOR CUFF TEAR: Status: RESOLVED | Noted: 2025-01-22 | Resolved: 2025-05-15

## 2025-05-15 PROBLEM — I95.9 HYPOTENSION: Status: RESOLVED | Noted: 2024-11-25 | Resolved: 2025-05-15

## 2025-05-15 PROCEDURE — 99214 OFFICE O/P EST MOD 30 MIN: CPT | Performed by: FAMILY MEDICINE

## 2025-05-15 PROCEDURE — 3074F SYST BP LT 130 MM HG: CPT | Performed by: FAMILY MEDICINE

## 2025-05-15 PROCEDURE — 3078F DIAST BP <80 MM HG: CPT | Performed by: FAMILY MEDICINE

## 2025-05-15 RX ORDER — MEPERIDINE HYDROCHLORIDE 25 MG/ML
25 INJECTION INTRAMUSCULAR; INTRAVENOUS; SUBCUTANEOUS PRN
OUTPATIENT
Start: 2025-05-22

## 2025-05-15 RX ORDER — METHYLPREDNISOLONE SODIUM SUCCINATE 125 MG/2ML
125 INJECTION, POWDER, LYOPHILIZED, FOR SOLUTION INTRAMUSCULAR; INTRAVENOUS PRN
OUTPATIENT
Start: 2025-05-22

## 2025-05-15 RX ORDER — ALBUTEROL SULFATE 5 MG/ML
2.5 SOLUTION RESPIRATORY (INHALATION) PRN
OUTPATIENT
Start: 2025-05-22

## 2025-05-15 RX ORDER — SODIUM CHLORIDE 9 MG/ML
1000 INJECTION, SOLUTION INTRAVENOUS PRN
OUTPATIENT
Start: 2025-05-22

## 2025-05-15 RX ORDER — LORAZEPAM 0.5 MG/1
0.5 TABLET ORAL PRN
OUTPATIENT
Start: 2025-05-22

## 2025-05-15 RX ORDER — DIPHENHYDRAMINE HYDROCHLORIDE 50 MG/ML
25 INJECTION, SOLUTION INTRAMUSCULAR; INTRAVENOUS PRN
OUTPATIENT
Start: 2025-05-22

## 2025-05-15 RX ORDER — DIPHENHYDRAMINE HYDROCHLORIDE 50 MG/ML
50 INJECTION, SOLUTION INTRAMUSCULAR; INTRAVENOUS PRN
Status: CANCELLED | OUTPATIENT
Start: 2025-11-11

## 2025-05-15 RX ORDER — ONDANSETRON 2 MG/ML
8 INJECTION INTRAMUSCULAR; INTRAVENOUS PRN
OUTPATIENT
Start: 2025-05-22

## 2025-05-15 RX ORDER — EPINEPHRINE 1 MG/ML(1)
0.5 AMPUL (ML) INJECTION PRN
Status: CANCELLED | OUTPATIENT
Start: 2025-11-11

## 2025-05-15 RX ORDER — ACETAMINOPHEN 325 MG/1
650 TABLET ORAL PRN
OUTPATIENT
Start: 2025-05-22

## 2025-05-15 RX ORDER — LORAZEPAM 2 MG/ML
0.5 INJECTION INTRAMUSCULAR PRN
OUTPATIENT
Start: 2025-05-22

## 2025-05-15 RX ORDER — METHYLPREDNISOLONE SODIUM SUCCINATE 125 MG/2ML
125 INJECTION, POWDER, LYOPHILIZED, FOR SOLUTION INTRAMUSCULAR; INTRAVENOUS PRN
Status: CANCELLED | OUTPATIENT
Start: 2025-11-11

## 2025-05-15 RX ORDER — ONDANSETRON 8 MG/1
8 TABLET, ORALLY DISINTEGRATING ORAL PRN
OUTPATIENT
Start: 2025-05-22

## 2025-05-15 RX ORDER — EPINEPHRINE 1 MG/ML(1)
0.5 AMPUL (ML) INJECTION PRN
OUTPATIENT
Start: 2025-05-22

## 2025-05-15 ASSESSMENT — ENCOUNTER SYMPTOMS
DOUBLE VISION: 0
NERVOUS/ANXIOUS: 0
DEPRESSION: 0
SHORTNESS OF BREATH: 0
INSOMNIA: 0
CONSTIPATION: 0
BLURRED VISION: 0
PALPITATIONS: 0
WHEEZING: 0
DIARRHEA: 0
ABDOMINAL PAIN: 0

## 2025-05-15 ASSESSMENT — FIBROSIS 4 INDEX: FIB4 SCORE: 1.66

## 2025-05-15 NOTE — PROGRESS NOTES
Verbal consent was acquired by the patient to use [employer] ambient listening note generation during this visit Yes    Subjective:   Yoselin Jorge is a 79 y.o. female here today for   Chief Complaint   Patient presents with    Annual Exam     History of Present Illness  The patient is a 79-year-old female who presents today for an annual physical exam. She has multiple comorbidities, including a history of seizures, currently managed with lacosamide, hypothyroidism treated with levothyroxine 100 mcg daily, GERD managed with Protonix 20 mg daily, and hyperlipidemia controlled with atorvastatin 10 mg daily.    She reports significant improvement in her osteoarthritis symptoms after making dietary changes, such as eliminating gluten, reducing sugar intake, and avoiding processed foods. These changes have led to enhanced mobility in her hand within 10 days. A hand specialist had suggested an injection for her osteoarthritis, but she declined.    She is seeking a referral to an ear, nose, and throat specialist within the [facility]. Currently, she sees Dr. Wong for earwax removal every 3 months, which has improved her hearing. However, she is concerned that her throat and nose are not being adequately examined. She has a history of cancer and has undergone throat dilation procedures every few years. She is cautious about her diet and experiences regurgitation of water through her nose. She plans to schedule an appointment with a gastroenterologist for further evaluation of her throat.    Patient does have significant history of seizure disorder being followed by neurology.  Is previously been on Keppra but was having significant side effects of somnolence behavioral changes.  Recently switched to lacosamide 100 mg twice daily and has noted significant movement in her sleep, energy level, as well as mood.  She plans on continuing with lacosamide at this time.    She experienced elevated blood pressure readings  during a recent hospital visit, which caused some concern, but her blood pressure is within normal range today.    History of osteoporosis.  Discussed treatment including bisphosphonates; however, bisphosphonates contraindicated due to history of esophageal stricture secondary to head and neck cancer.    She has not undergone a gynecological exam in several years and is questioning the need for continued mammograms given her history of cancer. Her last mammogram was conducted 2 years ago. She performs regular self-breast exams and has not detected any abnormalities.    She is curious about the rationale for her atorvastatin prescription as a preventive measure against cardiovascular disease.    She has been prescribed amitriptyline for sleep but has not been taking it due to satisfactory sleep quality since discontinuing Keppra.    She has joined a gym but has not yet started attending. She plans to use the treadmill and track her steps. She maintains an active lifestyle throughout the day. She does not consume alcohol or use tobacco or other drugs. She receives dental cleanings every 3 to 4 months. She reports no chest pain, shortness of breath, difficulty breathing, abdominal pain, diarrhea, or constipation. She has a persistent issue with phlegm production due to her throat condition. She reports no symptoms of depression or anxiety. She is currently undergoing physical therapy twice a week for a rotator cuff issue. She has lost approximately 20 pounds since November 2024.    SOCIAL HISTORY  - Does not smoke  - Does not drink alcohol  - Does not use drugs      Allergies[1]      Current medicines (including changes today)  Current Medications[2]  She  has a past medical history of Arthritis, Cancer (HCC) (2007), Dental disorder, Disorder of thyroid, Epigastric abdominal pain of unknown etiology (07/12/2019), Exudative age-related macular degeneration of left eye with active choroidal neovascularization (HCC)  "(07/27/2020), Family history of melanoma (03/11/2013), Flat foot(734) (03/17/2013), Heart burn, Herniated cervical disc, Hiatus hernia syndrome, High cholesterol, History of nasopharyngeal cancer (03/11/2013), Hyperlipidemia (03/11/2013), Impaired fasting glucose (03/17/2013), Indigestion, Menopause (07/27/2020), Other lymphedema (03/11/2013), PONV (postoperative nausea and vomiting), Prediabetes (05/11/2017), Rib pain on right side (07/31/2018), S/P dilatation of esophageal stricture (05/16/2015), S/P thyroidectomy (05/22/2015), Seizure (HCC) (11/2024), SENSORINEURAL HEARING LOSS (03/17/2013), Shoulder pain (2014), Swallowing impaired, Unspecified cataract, Unspecified urinary incontinence, and Unwitnessed fall (11/24/2024).    ROS   Review of Systems   HENT:  Negative for hearing loss.    Eyes:  Negative for blurred vision and double vision.   Respiratory:  Negative for shortness of breath and wheezing.    Cardiovascular:  Negative for chest pain and palpitations.   Gastrointestinal:  Negative for abdominal pain, constipation and diarrhea.   Psychiatric/Behavioral:  Negative for depression. The patient is not nervous/anxious and does not have insomnia.      -See HPI     Objective:     Physical Exam:  /64 (BP Location: Left arm, Patient Position: Sitting, BP Cuff Size: Adult)   Pulse (!) 59   Temp 36.4 °C (97.6 °F) (Temporal)   Resp 12   Ht 1.6 m (5' 2.99\")   Wt 69.9 kg (154 lb)   SpO2 95%  Body mass index is 27.29 kg/m².   Constitutional: Alert, no distress.  Skin: Warm, dry, good turgor, no rashes in visible areas.  Eye: Equal, round and reactive, conjunctiva clear, lids normal.  ENMT: TM's clear bilaterally, lips without lesions, good dentition, oropharynx clear.  Neck: Trachea midline, no masses, no thyromegaly. No cervical or supraclavicular lymphadenopathy.  Respiratory: Unlabored respiratory effort, lungs clear to auscultation, no wheezes, no rhonchi.  Cardiovascular: Normal S1, S2, no murmur, no " edema.  Abdomen: Soft, non-tender, no masses, no hepatosplenomegaly.  Psych: Alert and oriented x3, normal affect and mood.    Results      Assessment and Plan:     Assessment & Plan  1. Osteoarthritis.  Reports significant improvement in symptoms after making dietary changes, including cutting out gluten, minimal sugars, and no processed foods.  Treatment plan: No new treatment prescribed.  Clinical decision making: Declined a corticosteroid injection from her hand specialist. Able to move her hand better than before.    2. Earwax buildup.  Patient has chronic left ear complaints including earwax buildup, loss of hearing.  This is all secondary to changes from radiation for treatment of head neck cancer.  Requested a referral to an ENT specialist for earwax removal and throat examination.  Diagnostic plan: Referral to ENT and Hearing will be arranged. Will be referred to a different ENT specialist.  Clinical decision making: Current ENT specialist is not addressing her cancer-related concerns.    3. Osteoporosis.  Advised to maintain muscle mass through weightlifting and ensure a daily protein intake of 90 g or more.  Bisphosphonates including Fosamax, Reclast are contraindicated at this time but given significant history of dental work as well as history of esophageal strictures, GERD secondary to changes to esophagus from previous radiation therapy.  Treatment plan: Prolia infusions every 6 months have been ordered as an alternative to Reclast due to her risk of erosive esophagitis. Will be contacted by the infusion center for scheduling.  Clinical decision making: Previous order for Prolia was placed but not released; will follow up to ensure scheduling.    4. Hyperlipidemia.  Will continue atorvastatin 10 mg daily for cholesterol management.  Diagnostic plan: Cholesterol levels will be rechecked with fasting labs.  Clinical decision making: Previous high cholesterol managed effectively with atorvastatin. No  changes to current medication regimen.    5. Seizure disorder.  Reports feeling 100% better on lacosamide compared to Keppra. Improved mood, energy, and appetite since switching to lacosamide.  Treatment plan: Will continue taking lacosamide. Keppra has been discontinued.    6. Hypothyroidism.  Thyroid function was last checked six months ago and was normal.  Treatment plan: Will continue taking levothyroxine 100 mcg daily.  Clinical decision making: Thyroid function will be rechecked. Advised to take levothyroxine separately from Protonix to avoid interaction.    7. Gastroesophageal reflux disease (GERD).  Will continue taking Protonix 20 mg daily.  Clinical decision making: No new symptoms reported. Medication regimen remains effective. No changes to current treatment.    8.Annual   -All questions concerns were answered at this time.  -Vaccinations/screenings needed at this time: Up-to-date on screens, labs, vaccinations.  -Labs reviewed, discussed elevated blood glucose as well as hyperlipidemia as above.  We will need to recheck A1c in 3 to 6 months.  -Discussed the importance of a healthy, Mediterranean style diet, routine exercise regimen.  -Discussed general safety measures including seatbelts, helmets, avoidance of smoking, sunscreen, hydration.  -Follow-up for general physical exam on a yearly basis, sooner if needed.    Follow-up: Follow up in 6 months or sooner if needed. Prescription Drug Monitoring Program was reviewed.    Orders:  1. Annual physical exam    2. Age-related osteoporosis without current pathological fracture  - Basic Metabolic Panel; Future  - denosumab (Prolia) injection 60 mg  - diphenhydrAMINE (Benadryl) injection 25 mg  - famotidine (Pepcid) injection 20 mg  - methylPREDNISolone sod succ (Solu-Medrol) 125 MG injection 125 mg  - acetaminophen (Tylenol) tablet 650 mg  - albuterol (Proventil) 2.5mg/0.5ml nebulizer solution 2.5 mg  - ondansetron (Zofran ODT) dispertab 8 mg  - ondansetron  (Zofran) syringe/vial injection 8 mg  - NS infusion 1,000 mL  - meperidine (Demerol) injection 25 mg  - LORazepam (Ativan) tablet 0.5 mg  - LORazepam (Ativan) injection 0.5 mg  - EPINEPHrine (ANAPHYLAXIS DOSE) IM 0.5 mg  - glucagon injection 1 mg    3. Fracture Risk Assessment Score (FRAX) indicating greater than 20% risk for major osteoporosis-related fracture  - denosumab (Prolia) injection 60 mg  - diphenhydrAMINE (Benadryl) injection 25 mg  - famotidine (Pepcid) injection 20 mg  - methylPREDNISolone sod succ (Solu-Medrol) 125 MG injection 125 mg  - acetaminophen (Tylenol) tablet 650 mg  - albuterol (Proventil) 2.5mg/0.5ml nebulizer solution 2.5 mg  - ondansetron (Zofran ODT) dispertab 8 mg  - ondansetron (Zofran) syringe/vial injection 8 mg  - NS infusion 1,000 mL  - meperidine (Demerol) injection 25 mg  - LORazepam (Ativan) tablet 0.5 mg  - LORazepam (Ativan) injection 0.5 mg  - EPINEPHrine (ANAPHYLAXIS DOSE) IM 0.5 mg  - glucagon injection 1 mg    4. Fracture Risk Assessment Score (FRAX) indicating greater than 3% risk for hip fracture  - denosumab (Prolia) injection 60 mg  - diphenhydrAMINE (Benadryl) injection 25 mg  - famotidine (Pepcid) injection 20 mg  - methylPREDNISolone sod succ (Solu-Medrol) 125 MG injection 125 mg  - acetaminophen (Tylenol) tablet 650 mg  - albuterol (Proventil) 2.5mg/0.5ml nebulizer solution 2.5 mg  - ondansetron (Zofran ODT) dispertab 8 mg  - ondansetron (Zofran) syringe/vial injection 8 mg  - NS infusion 1,000 mL  - meperidine (Demerol) injection 25 mg  - LORazepam (Ativan) tablet 0.5 mg  - LORazepam (Ativan) injection 0.5 mg  - EPINEPHrine (ANAPHYLAXIS DOSE) IM 0.5 mg  - glucagon injection 1 mg    5. Mixed hyperlipidemia    6. Seizure disorder (HCC)    7. Hypothyroidism (acquired)  - TSH WITH REFLEX TO FT4; Future    8. History of nasopharyngeal cancer  - Referral to ENT    9. Screening for cardiovascular condition  - Lipid Profile; Future    10. Elevated hemoglobin A1c  -  HEMOGLOBIN A1C; Future    11. Encounter for screening mammogram for malignant neoplasm of breast  - MA-SCREENING MAMMO BILAT W/TOMOSYNTHESIS W/CAD; Future    12. S/P dilatation of esophageal stricture    13. Gastroesophageal reflux disease without esophagitis    14. Bilateral hearing loss, unspecified hearing loss type    Other orders  - amitriptyline (ELAVIL) 25 MG Tab; Take 25 mg by mouth every evening.        Followup: No follow-ups on file.         PLEASE NOTE: This dictation was created using voice recognition and [employer] ambient listening software. I have made every reasonable attempt to correct obvious errors, but I expect that there are errors of grammar and possibly content that I did not discover before finalizing the note.         [1]   Allergies  Allergen Reactions    Oxycodone Swelling     Made lips swell   [2]   Current Outpatient Medications   Medication Sig Dispense Refill    Lacosamide 100 MG/10ML Solution Take 100 mg by mouth 2 times a day for 180 days. Indications: Focal Epilepsy 1800 mL 1    Multiple Vitamins-Minerals (CENTRUM ADULT 50+ MULTIGUMMIES PO)       Multiple Vitamins-Minerals (PRESERVISION AREDS PO) Take  by mouth every day.      pantoprazole (PROTONIX) 20 MG tablet Take 1 Tablet by mouth every day. 90 Tablet 3    levothyroxine (SYNTHROID) 100 MCG Tab Take 1 Tablet by mouth every morning on an empty stomach. 100 Tablet 3    atorvastatin (LIPITOR) 10 MG Tab Take 1 Tablet by mouth every day. 100 Tablet 3     No current facility-administered medications for this visit.

## 2025-05-16 ENCOUNTER — HOSPITAL ENCOUNTER (OUTPATIENT)
Dept: LAB | Facility: MEDICAL CENTER | Age: 80
End: 2025-05-16
Attending: FAMILY MEDICINE
Payer: MEDICARE

## 2025-05-16 DIAGNOSIS — E03.9 HYPOTHYROIDISM (ACQUIRED): ICD-10-CM

## 2025-05-16 DIAGNOSIS — Z13.6 SCREENING FOR CARDIOVASCULAR CONDITION: ICD-10-CM

## 2025-05-16 DIAGNOSIS — M81.0 AGE-RELATED OSTEOPOROSIS WITHOUT CURRENT PATHOLOGICAL FRACTURE: ICD-10-CM

## 2025-05-16 DIAGNOSIS — R73.09 ELEVATED HEMOGLOBIN A1C: ICD-10-CM

## 2025-05-16 LAB
EST. AVERAGE GLUCOSE BLD GHB EST-MCNC: 123 MG/DL
HBA1C MFR BLD: 5.9 % (ref 4–5.6)

## 2025-05-16 PROCEDURE — 83036 HEMOGLOBIN GLYCOSYLATED A1C: CPT

## 2025-05-16 PROCEDURE — 80048 BASIC METABOLIC PNL TOTAL CA: CPT

## 2025-05-16 PROCEDURE — 80061 LIPID PANEL: CPT

## 2025-05-16 PROCEDURE — 84443 ASSAY THYROID STIM HORMONE: CPT

## 2025-05-16 PROCEDURE — 84439 ASSAY OF FREE THYROXINE: CPT

## 2025-05-16 PROCEDURE — 36415 COLL VENOUS BLD VENIPUNCTURE: CPT

## 2025-05-17 LAB
ANION GAP SERPL CALC-SCNC: 10 MMOL/L (ref 7–16)
BUN SERPL-MCNC: 19 MG/DL (ref 8–22)
CALCIUM SERPL-MCNC: 9.5 MG/DL (ref 8.5–10.5)
CHLORIDE SERPL-SCNC: 104 MMOL/L (ref 96–112)
CHOLEST SERPL-MCNC: 196 MG/DL (ref 100–199)
CO2 SERPL-SCNC: 24 MMOL/L (ref 20–33)
CREAT SERPL-MCNC: 0.69 MG/DL (ref 0.5–1.4)
FASTING STATUS PATIENT QL REPORTED: NORMAL
GFR SERPLBLD CREATININE-BSD FMLA CKD-EPI: 88 ML/MIN/1.73 M 2
GLUCOSE SERPL-MCNC: 91 MG/DL (ref 65–99)
HDLC SERPL-MCNC: 70 MG/DL
LDLC SERPL CALC-MCNC: 114 MG/DL
POTASSIUM SERPL-SCNC: 4.2 MMOL/L (ref 3.6–5.5)
SODIUM SERPL-SCNC: 138 MMOL/L (ref 135–145)
T4 FREE SERPL-MCNC: 0.67 NG/DL (ref 0.93–1.7)
TRIGL SERPL-MCNC: 58 MG/DL (ref 0–149)
TSH SERPL DL<=0.005 MIU/L-ACNC: 17.3 UIU/ML (ref 0.38–5.33)

## 2025-05-18 ENCOUNTER — RESULTS FOLLOW-UP (OUTPATIENT)
Dept: MEDICAL GROUP | Facility: LAB | Age: 80
End: 2025-05-18

## 2025-05-29 ENCOUNTER — OFFICE VISIT (OUTPATIENT)
Dept: MEDICAL GROUP | Facility: LAB | Age: 80
End: 2025-05-29
Payer: MEDICARE

## 2025-05-29 VITALS
BODY MASS INDEX: 27.11 KG/M2 | OXYGEN SATURATION: 96 % | DIASTOLIC BLOOD PRESSURE: 66 MMHG | HEIGHT: 63 IN | SYSTOLIC BLOOD PRESSURE: 126 MMHG | HEART RATE: 62 BPM | RESPIRATION RATE: 14 BRPM | TEMPERATURE: 97.8 F | WEIGHT: 153 LBS

## 2025-05-29 DIAGNOSIS — E03.9 HYPOTHYROIDISM (ACQUIRED): Primary | ICD-10-CM

## 2025-05-29 RX ORDER — LEVOTHYROXINE SODIUM 112 UG/1
112 TABLET ORAL
Qty: 90 TABLET | Refills: 3 | Status: SHIPPED | OUTPATIENT
Start: 2025-05-29

## 2025-05-29 ASSESSMENT — FIBROSIS 4 INDEX: FIB4 SCORE: 1.66

## 2025-05-29 NOTE — PROGRESS NOTES
Verbal consent was acquired by the patient to use [employer] ambient listening note generation during this visit Yes    Subjective:   Yoselin Jorge is a 79 y.o. female here today for   No chief complaint on file.    History of Present Illness  The patient is a 79-year-old female here today to follow up on recent blood work. She has a past medical history of multiple comorbidities, including hypothyroidism, for which she is currently on Synthroid 100 mcg daily. Recent labs completed on 05/16/2025 showed an elevated TSH of 17.3 and a decreased free T4 of 0.67.    She reports feeling great, with no trouble sleeping or experiencing fatigue. She takes all her medications in the morning. The patient inquired about the impact of her antiseizure medication on her thyroid medication, and it was discussed that the antiseizure medication might be affecting the metabolism of her thyroid medication.  Patient is taking all medications together in the morning at breakfast time.    She has an upcoming mammogram and infusion appointment. She has not heard back from an ENT referral.      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), Hyperlipidemia (03/11/2013), Impaired fasting glucose (03/17/2013), Indigestion, Menopause (07/27/2020), Other lymphedema (03/11/2013), PONV (postoperative nausea and vomiting), Prediabetes (05/11/2017), Rib pain on right side (07/31/2018), S/P dilatation of esophageal stricture  "(05/16/2015), S/P thyroidectomy (05/22/2015), Seizure (HCC) (11/2024), SENSORINEURAL HEARING LOSS (03/17/2013), Shoulder pain (2014), Subacromial bursitis (01/22/2025), Superior glenoid labrum lesion of left shoulder (01/22/2025), Swallowing impaired, Unspecified cataract, Unspecified urinary incontinence, and Unwitnessed fall (11/24/2024).    ROS   ROS  -See HPI     Objective:     Physical Exam:  /66 (BP Location: Left arm, Patient Position: Sitting, BP Cuff Size: Adult)   Pulse 62   Temp 36.6 °C (97.8 °F) (Temporal)   Resp 14   Ht 1.6 m (5' 2.99\")   Wt 69.4 kg (153 lb)   SpO2 96%  Body mass index is 27.11 kg/m².   Constitutional: Alert, no distress, well-groomed.  Skin: No rashes in visible areas.  Eye: Round. Conjunctiva clear, lids normal. No icterus.   ENMT: Lips pink without lesions, good dentition, moist mucous membranes. Phonation normal.  Neck: No masses, no thyromegaly. Moves freely without pain.  Respiratory: Unlabored respiratory effort, no cough or audible wheeze  Psych: Alert and oriented x3, normal affect and mood.  Results   Latest Reference Range & Units 05/16/25 07:22   Sodium 135 - 145 mmol/L 138   Potassium 3.6 - 5.5 mmol/L 4.2   Chloride 96 - 112 mmol/L 104   Co2 20 - 33 mmol/L 24   Anion Gap 7.0 - 16.0  10.0   Glucose 65 - 99 mg/dL 91   Bun 8 - 22 mg/dL 19   Creatinine 0.50 - 1.40 mg/dL 0.69   GFR (CKD-EPI) >60 mL/min/1.73 m 2 88   Calcium 8.5 - 10.5 mg/dL 9.5   Glycohemoglobin 4.0 - 5.6 % 5.9 (H)   Estim. Avg Glu mg/dL 123   Fasting Status  Fasting   Cholesterol,Tot 100 - 199 mg/dL 196   Triglycerides 0 - 149 mg/dL 58   HDL >=40 mg/dL 70   LDL <100 mg/dL 114 (H)   TSH 0.380 - 5.330 uIU/mL 17.300 (H)   Free T-4 0.93 - 1.70 ng/dL 0.67 (L)   (H): Data is abnormally high  (L): Data is abnormally low      - Labs:    - TSH: 17.3 (05/16/2025)    - Free T4: 0.67 (05/16/2025)    Assessment and Plan:     Assessment & Plan  Hypothyroidism  Chronic condition, unstable.  Medication change " needed.  Recent labs from 05/16/2025 show elevated TSH of 17.3 and decreased free T4 of 0.67. No reported symptoms of fatigue, sleep disturbances, or other hypothyroid-related issues.  Diagnostic plan: Blood test ordered to monitor thyroid levels.  Treatment plan: Synthroid dosage increased from 100 mcg to 112 mcg daily.  Clinical decision making: Discussion about the impact of anticonvulsants on thyroid medication metabolism and the need for dosage adjustment.    Health maintenance  Upcoming mammogram and infusion appointments noted. Advised to call the ENT clinic to follow up on the referral.    Follow-up: Further adjustments to be communicated via [employer].    Orders:  1. Hypothyroidism (acquired)  - levothyroxine (SYNTHROID) 112 MCG Tab; Take 1 Tablet by mouth every morning on an empty stomach.  Dispense: 90 Tablet; Refill: 3  - TSH WITH REFLEX TO FT4; Future        Followup: No follow-ups on file.         PLEASE NOTE: This dictation was created using voice recognition and [employer] ambient listening software. I have made every reasonable attempt to correct obvious errors, but I expect that there are errors of grammar and possibly content that I did not discover before finalizing the note.         [1]   Allergies  Allergen Reactions    Oxycodone Swelling     Made lips swell   [2]   Current Outpatient Medications   Medication Sig Dispense Refill    amitriptyline (ELAVIL) 25 MG Tab Take 25 mg by mouth every evening.      Lacosamide 100 MG/10ML Solution Take 100 mg by mouth 2 times a day for 180 days. Indications: Focal Epilepsy 1800 mL 1    Multiple Vitamins-Minerals (CENTRUM ADULT 50+ MULTIGUMMIES PO)       Multiple Vitamins-Minerals (PRESERVISION AREDS PO) Take  by mouth every day.      pantoprazole (PROTONIX) 20 MG tablet Take 1 Tablet by mouth every day. 90 Tablet 3    levothyroxine (SYNTHROID) 100 MCG Tab Take 1 Tablet by mouth every morning on an empty stomach. 100 Tablet 3    atorvastatin (LIPITOR) 10 MG  Tab Take 1 Tablet by mouth every day. 100 Tablet 3     No current facility-administered medications for this visit.

## 2025-06-04 ENCOUNTER — HOSPITAL ENCOUNTER (OUTPATIENT)
Dept: RADIOLOGY | Facility: MEDICAL CENTER | Age: 80
End: 2025-06-04
Attending: FAMILY MEDICINE
Payer: MEDICARE

## 2025-06-04 DIAGNOSIS — Z12.31 ENCOUNTER FOR SCREENING MAMMOGRAM FOR MALIGNANT NEOPLASM OF BREAST: ICD-10-CM

## 2025-06-04 PROCEDURE — 77063 BREAST TOMOSYNTHESIS BI: CPT

## 2025-06-12 ENCOUNTER — OUTPATIENT INFUSION SERVICES (OUTPATIENT)
Dept: ONCOLOGY | Facility: MEDICAL CENTER | Age: 80
End: 2025-06-12
Attending: FAMILY MEDICINE
Payer: MEDICARE

## 2025-06-12 VITALS
BODY MASS INDEX: 27.7 KG/M2 | RESPIRATION RATE: 16 BRPM | WEIGHT: 156.31 LBS | OXYGEN SATURATION: 97 % | DIASTOLIC BLOOD PRESSURE: 55 MMHG | HEART RATE: 55 BPM | SYSTOLIC BLOOD PRESSURE: 143 MMHG | HEIGHT: 63 IN | TEMPERATURE: 98.1 F

## 2025-06-12 DIAGNOSIS — M81.0 AGE-RELATED OSTEOPOROSIS WITHOUT CURRENT PATHOLOGICAL FRACTURE: Primary | ICD-10-CM

## 2025-06-12 DIAGNOSIS — Z91.89 FRACTURE RISK ASSESSMENT SCORE (FRAX) INDICATING GREATER THAN 20% RISK FOR MAJOR OSTEOPOROSIS-RELATED FRACTURE: ICD-10-CM

## 2025-06-12 DIAGNOSIS — Z91.89 FRACTURE RISK ASSESSMENT SCORE (FRAX) INDICATING GREATER THAN 3% RISK FOR HIP FRACTURE: ICD-10-CM

## 2025-06-12 LAB
CA-I BLD ISE-SCNC: 1.18 MMOL/L (ref 1.1–1.3)
CREAT BLD-MCNC: 0.7 MG/DL (ref 0.5–1.4)

## 2025-06-12 PROCEDURE — 36415 COLL VENOUS BLD VENIPUNCTURE: CPT

## 2025-06-12 PROCEDURE — 82330 ASSAY OF CALCIUM: CPT

## 2025-06-12 PROCEDURE — 96372 THER/PROPH/DIAG INJ SC/IM: CPT

## 2025-06-12 PROCEDURE — 700111 HCHG RX REV CODE 636 W/ 250 OVERRIDE (IP): Mod: JZ,TB | Performed by: FAMILY MEDICINE

## 2025-06-12 PROCEDURE — 82565 ASSAY OF CREATININE: CPT

## 2025-06-12 RX ORDER — EPINEPHRINE 1 MG/ML(1)
0.5 AMPUL (ML) INJECTION PRN
OUTPATIENT
Start: 2025-12-09

## 2025-06-12 RX ORDER — ONDANSETRON 2 MG/ML
8 INJECTION INTRAMUSCULAR; INTRAVENOUS PRN
OUTPATIENT
Start: 2025-12-09

## 2025-06-12 RX ORDER — METHYLPREDNISOLONE SODIUM SUCCINATE 125 MG/2ML
125 INJECTION, POWDER, LYOPHILIZED, FOR SOLUTION INTRAMUSCULAR; INTRAVENOUS PRN
OUTPATIENT
Start: 2025-12-09

## 2025-06-12 RX ORDER — LORAZEPAM 1 MG/1
0.5 TABLET ORAL PRN
OUTPATIENT
Start: 2025-12-09

## 2025-06-12 RX ORDER — DIPHENHYDRAMINE HYDROCHLORIDE 50 MG/ML
25 INJECTION, SOLUTION INTRAMUSCULAR; INTRAVENOUS PRN
OUTPATIENT
Start: 2025-12-09

## 2025-06-12 RX ORDER — ALBUTEROL SULFATE 5 MG/ML
2.5 SOLUTION RESPIRATORY (INHALATION) PRN
OUTPATIENT
Start: 2025-12-09

## 2025-06-12 RX ORDER — SODIUM CHLORIDE 9 MG/ML
1000 INJECTION, SOLUTION INTRAVENOUS PRN
OUTPATIENT
Start: 2025-12-09

## 2025-06-12 RX ORDER — ACETAMINOPHEN 325 MG/1
650 TABLET ORAL PRN
OUTPATIENT
Start: 2025-12-09

## 2025-06-12 RX ORDER — MEPERIDINE HYDROCHLORIDE 25 MG/ML
25 INJECTION INTRAMUSCULAR; INTRAVENOUS; SUBCUTANEOUS PRN
OUTPATIENT
Start: 2025-12-09

## 2025-06-12 RX ORDER — LORAZEPAM 2 MG/ML
0.5 INJECTION INTRAMUSCULAR PRN
OUTPATIENT
Start: 2025-12-09

## 2025-06-12 RX ORDER — ONDANSETRON 8 MG/1
8 TABLET, ORALLY DISINTEGRATING ORAL PRN
OUTPATIENT
Start: 2025-12-09

## 2025-06-12 RX ADMIN — DENOSUMAB 60 MG: 60 INJECTION SUBCUTANEOUS at 11:51

## 2025-06-12 ASSESSMENT — FIBROSIS 4 INDEX: FIB4 SCORE: 1.66

## 2025-06-13 NOTE — PROGRESS NOTES
Yoselin Ackerman [facility] for Prolia injection. Pt denies recent/pending oral surgery, active infections or wounds. Reviewed need to supplement Ca+ 1200 mg and Vit D 400 IU. 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 Discussed other formulations, Pt to look into it. Prolia drug info reviewed with Pt and [facility], handout provided and questions answered.    POC reviewed.    25 G butterfly used to draw labs from R AC/gauze and coban applied.     I-stat ionized Ca/CR within parameters for treatment.     Prolia administered per MD order, L BA, bandaid applied. Pt monitored for 10 minutes post injection, no s/sx of reaction noted.     Next appointment pending scheduling, e-mail sent and Yoselin Ackerman was discharged home in no acute distress.

## 2025-06-24 ENCOUNTER — HOSPITAL ENCOUNTER (OUTPATIENT)
Dept: LAB | Facility: MEDICAL CENTER | Age: 80
End: 2025-06-24
Attending: FAMILY MEDICINE
Payer: MEDICARE

## 2025-06-24 ENCOUNTER — RESULTS FOLLOW-UP (OUTPATIENT)
Dept: MEDICAL GROUP | Facility: LAB | Age: 80
End: 2025-06-24

## 2025-06-24 DIAGNOSIS — E03.9 HYPOTHYROIDISM (ACQUIRED): ICD-10-CM

## 2025-06-24 LAB — TSH SERPL DL<=0.005 MIU/L-ACNC: 0.46 UIU/ML (ref 0.38–5.33)

## 2025-06-24 PROCEDURE — 36415 COLL VENOUS BLD VENIPUNCTURE: CPT

## 2025-06-24 PROCEDURE — 84443 ASSAY THYROID STIM HORMONE: CPT

## 2025-06-26 ENCOUNTER — OFFICE VISIT (OUTPATIENT)
Dept: MEDICAL GROUP | Facility: LAB | Age: 80
End: 2025-06-26
Payer: MEDICARE

## 2025-06-26 VITALS
HEIGHT: 63 IN | TEMPERATURE: 98.9 F | WEIGHT: 156 LBS | SYSTOLIC BLOOD PRESSURE: 140 MMHG | RESPIRATION RATE: 14 BRPM | OXYGEN SATURATION: 94 % | BODY MASS INDEX: 27.64 KG/M2 | HEART RATE: 65 BPM | DIASTOLIC BLOOD PRESSURE: 62 MMHG

## 2025-06-26 DIAGNOSIS — G40.909 SEIZURE DISORDER (HCC): Primary | ICD-10-CM

## 2025-06-26 DIAGNOSIS — E03.9 HYPOTHYROIDISM (ACQUIRED): ICD-10-CM

## 2025-06-26 DIAGNOSIS — R03.0 ELEVATED BLOOD PRESSURE READING: ICD-10-CM

## 2025-06-26 PROCEDURE — 3078F DIAST BP <80 MM HG: CPT | Performed by: FAMILY MEDICINE

## 2025-06-26 PROCEDURE — 99214 OFFICE O/P EST MOD 30 MIN: CPT | Performed by: FAMILY MEDICINE

## 2025-06-26 PROCEDURE — 3077F SYST BP >= 140 MM HG: CPT | Performed by: FAMILY MEDICINE

## 2025-06-26 ASSESSMENT — FIBROSIS 4 INDEX: FIB4 SCORE: 1.68

## 2025-06-26 NOTE — PROGRESS NOTES
Verbal consent was acquired by the patient to use [employer] ambient listening note generation during this visit Yes    Subjective:   Yoselin Jorge is a 80 y.o. female here today for   No chief complaint on file.    History of Present Illness  The patient presents today to discuss hypothyroidism. She has a long-standing history of hypothyroidism and recently had her medications adjusted due to low T4 levels. Her medication was adjusted to Synthroid 112 mcg daily. Recent lab work shows a normalized TSH level of 0.455.    She reports feeling well overall, with no issues related to the new dosage of her medication. Her sleep pattern is somewhat disrupted, as she tends to wake up for 2 to 3 hours in the middle of the night before returning to sleep, resulting in a total of 6 to 8 hours of sleep per night. This issue has been present for approximately one week. She has not been using her sleep aid recently. She has not experienced any changes in her hair or nails.    She has noticed a slight increase in her blood pressure during her last few visits, which she attributes to being rushed. She owns a blood pressure cuff at home but expresses distrust in its accuracy. She recalls that her blood pressure was elevated during her infusion, with systolic readings in the 140s.     History of osteoporosis currently on Prolia.  She receives infusions every 6 months without any adverse reactions. She is currently not taking calcium supplements and is uncertain about the adequacy of calcium in Centrum for women over 50. She is also curious about the necessity of vitamin D supplementation.    Patient does have history of epilepsy, currently well-controlled with lacosamide.  She is seeking a referral to a different neurologist due to dissatisfaction with her current provider.      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), Hyperlipidemia (03/11/2013), Impaired fasting glucose (03/17/2013), Indigestion, Menopause (07/27/2020), Other lymphedema (03/11/2013), PONV (postoperative nausea and vomiting), Prediabetes (05/11/2017), Rib pain on right side (07/31/2018), S/P dilatation of esophageal stricture (05/16/2015), S/P thyroidectomy (05/22/2015), Seizure (HCC) (11/2024), SENSORINEURAL HEARING LOSS (03/17/2013), Shoulder pain (2014), Subacromial bursitis (01/22/2025), Superior glenoid labrum lesion of left shoulder (01/22/2025), Swallowing impaired, Unspecified cataract, Unspecified urinary incontinence, and Unwitnessed fall (11/24/2024).    ROS   ROS  -See HPI     Objective:     Physical Exam:  BP (!) 140/62 (BP Location: Left arm, Patient Position: Sitting, BP Cuff Size: Adult)   Pulse 65   Temp 37.2 °C (98.9 °F) (Temporal)   Resp 14   Ht 1.59 m (5' 2.6\")   Wt 70.8 kg (156 lb)   SpO2 94%  Body mass index is 27.99 kg/m².   Constitutional: Alert, no distress, well-groomed.  Skin: No rashes in visible areas.  Eye: Round. Conjunctiva clear, lids normal. No icterus.   ENMT: Lips pink without lesions, good dentition, moist mucous membranes. Phonation normal.  Neck: No masses, no thyromegaly. Moves freely without pain.  Respiratory: Unlabored respiratory effort, no cough or audible wheeze  Psych: Alert and oriented x3, normal affect and mood.     Results  - Laboratory Studies:    - TSH: 0.455    Assessment and Plan:     Assessment & Plan  Hypothyroidism  TSH levels have normalized at 0.455 following the adjustment of Synthroid " dosage to 112 mcg daily. Reports no issues with the new dosage. Blood work showed normalized TSH.  Treatment plan: Continue with the current Synthroid dosage.    Elevated blood pressure  Blood pressure readings have been consistently elevated, with the most recent reading at 143/55. Blood pressure was high during the last infusion as well.  Treatment plan: Advised to monitor blood pressure at home and report any persistently high readings. Further intervention may be necessary if blood pressure remains elevated.    Osteoporosis  Receives infusions every 6 months with no adverse reactions. Advised to continue taking Centrum for women over 50 and ensure adequate intake of calcium and vitamin D through over-the-counter supplements. Discussed the importance of maintaining adequate calcium and vitamin D levels.  Diagnostic plan: Blood work will be done every 6-12 months to monitor levels.    Seizure disorder  Dissatisfied with current neurologist due to non-responsiveness. Referral to [facility] will be initiated. No immediate need to see a neurologist, but a referral will be in place for future use. Referral team will contact her regarding the new neurologist.    Orders:  1. Hypothyroidism (acquired)    2. Seizure disorder (HCC)  - Referral to Neurology    3. Elevated blood pressure reading        Followup: No follow-ups on file.         PLEASE NOTE: This dictation was created using voice recognition and [employer] ambient listening software. I have made every reasonable attempt to correct obvious errors, but I expect that there are errors of grammar and possibly content that I did not discover before finalizing the note.         [1]   Allergies  Allergen Reactions    Oxycodone Swelling     Made lips swell   [2]   Current Outpatient Medications   Medication Sig Dispense Refill    levothyroxine (SYNTHROID) 112 MCG Tab Take 1 Tablet by mouth every morning on an empty stomach. 90 Tablet 3    amitriptyline (ELAVIL) 25  MG Tab Take 25 mg by mouth every evening.      Lacosamide 100 MG/10ML Solution Take 100 mg by mouth 2 times a day for 180 days. Indications: Focal Epilepsy 1800 mL 1    Multiple Vitamins-Minerals (CENTRUM ADULT 50+ MULTIGUMMIES PO)       Multiple Vitamins-Minerals (PRESERVISION AREDS PO) Take  by mouth every day.      pantoprazole (PROTONIX) 20 MG tablet Take 1 Tablet by mouth every day. 90 Tablet 3    atorvastatin (LIPITOR) 10 MG Tab Take 1 Tablet by mouth every day. 100 Tablet 3     No current facility-administered medications for this visit.

## 2025-07-01 NOTE — Clinical Note
REFERRAL APPROVAL NOTICE         Sent on July 1, 2025                   Yoselin Jorge  [address]  [place] [address]                   Dear Ms. Jorge,    After a careful review of the medical information and benefit coverage, [facility] has processed your referral. See below for additional details.    If applicable, you must be actively enrolled with your insurance for coverage of the authorized service. If you have any questions regarding your coverage, please contact your insurance directly.    REFERRAL INFORMATION   Referral #:  [id number]  Referred-To Provider    Referred-By Provider:  Neurology    Brian Cutler M.D.   [facility]      [address]   [place] [address]  [phone] [id number]  [place] [address]  [phone]    Referral Start Date:  06/26/2025  Referral End Date:   06/26/2026             SCHEDULING  If you do not already have an appointment, please call [phone] to make an appointment.     MORE INFORMATION  If you do not already have a [facility] account, sign up at: [email or web address]  You can access your medical information, make appointments, see lab results, billing information, and more.  If you have questions regarding this referral, please contact  the [facility] Referrals department at:             [phone]. Monday - Friday 8:00AM - 5:00PM.     Sincerely,    [facility]

## 2025-07-16 ENCOUNTER — PATIENT MESSAGE (OUTPATIENT)
Dept: MEDICAL GROUP | Facility: LAB | Age: 80
End: 2025-07-16
Payer: MEDICARE

## 2025-07-16 DIAGNOSIS — R56.9 SEIZURES (HCC): ICD-10-CM

## 2025-07-16 RX ORDER — LACOSAMIDE 10 MG/ML
100 SOLUTION ORAL 2 TIMES DAILY
Qty: 200 ML | Refills: 0 | Status: SHIPPED | OUTPATIENT
Start: 2025-07-16 | End: 2025-07-17 | Stop reason: SDUPTHER

## 2025-07-16 RX ORDER — LACOSAMIDE 10 MG/ML
100 SOLUTION ORAL 2 TIMES DAILY
Qty: 1800 ML | Refills: 1 | Status: CANCELLED | OUTPATIENT
Start: 2025-07-16 | End: 2026-01-12

## 2025-07-16 NOTE — PATIENT COMMUNICATION
Received request via: Patient    Was the patient seen in the last year in this department? Yes    Does the patient have an active prescription (recently filled or refills available) for medication(s) requested? No    Pharmacy Name: [facility] [place]     Does the patient have intermediate Plus and need 100-day supply? (This applies to ALL medications) Patient does not have SCP

## 2025-07-16 NOTE — TELEPHONE ENCOUNTER
Received request via: Pharmacy    Medication Name/Dosage Lacosamide 100 MG/10ML Solution     When was medication last prescribed 07/16/2025 10 day supply    How many refills were previously provided 0    How many Refills does he patient have left from last prescription 0    Was the patient seen in the last year in this department? Yes   Date of last office visit 05/14/2025     Per last Neurology Office Visit, when was the date of next follow up visit set for?                            Date of office visit follow up request 6 mo     Does the patient have an upcoming appointment? Yes   If yes, when 11/04/2025             If no, schedule appointment done    Does the patient have long term Plus and need 100 day supply (blood pressure, diabetes and cholesterol meds only)? Yes, quantity updated to 100 days

## 2025-07-17 ENCOUNTER — TELEPHONE (OUTPATIENT)
Dept: PHARMACY | Facility: MEDICAL CENTER | Age: 80
End: 2025-07-17
Payer: MEDICARE

## 2025-07-17 RX ORDER — LACOSAMIDE 10 MG/ML
100 SOLUTION ORAL 2 TIMES DAILY
Qty: 1800 ML | Refills: 1 | Status: SHIPPED | OUTPATIENT
Start: 2025-07-17 | End: 2026-01-13

## 2025-07-17 NOTE — TELEPHONE ENCOUNTER
Received [facility]  request via [employer]  for   Lacosamide 100 MG/10ML Solution. (Quantity:1800, Day Supply:90)     Insurance: [employer]   Member ID:  [id number]  BIN: [id number]  PCN: [id number]  [facility]     Ran Test claim via "[employer]" & medication Pays for a $463.28 copay. Will outreach to patient to offer specialty pharmacy services and or release to preferred pharmacy    Costs more to fill at the [facility] pharmacy.

## 2025-08-29 ENCOUNTER — OFFICE VISIT (OUTPATIENT)
Dept: MEDICAL GROUP | Facility: LAB | Age: 80
End: 2025-08-29
Payer: MEDICARE

## 2025-08-29 VITALS
DIASTOLIC BLOOD PRESSURE: 62 MMHG | HEIGHT: 63 IN | RESPIRATION RATE: 14 BRPM | OXYGEN SATURATION: 96 % | TEMPERATURE: 99 F | WEIGHT: 152 LBS | BODY MASS INDEX: 26.93 KG/M2 | SYSTOLIC BLOOD PRESSURE: 112 MMHG | HEART RATE: 68 BPM

## 2025-08-29 DIAGNOSIS — G40.909 SEIZURE DISORDER (HCC): ICD-10-CM

## 2025-08-29 DIAGNOSIS — K22.2 ESOPHAGEAL STRICTURE: ICD-10-CM

## 2025-08-29 DIAGNOSIS — M85.89 OSTEOPENIA OF MULTIPLE SITES: ICD-10-CM

## 2025-08-29 DIAGNOSIS — H69.92 DYSFUNCTION OF LEFT EUSTACHIAN TUBE: Primary | ICD-10-CM

## 2025-08-29 ASSESSMENT — FIBROSIS 4 INDEX: FIB4 SCORE: 1.68

## (undated) DEVICE — SYRINGE 30 ML LL (56/BX)

## (undated) DEVICE — PACK VITRECTOMY (4EA/CA)

## (undated) DEVICE — ABLATOR WAND SERFAS 90-S CRUISE

## (undated) DEVICE — NEEDLE EXPRESSEW III (5EA/PK)

## (undated) DEVICE — SODIUM CHL. IRRIGATION 0.9% 3000ML (4EA/CA 65CA/PF)

## (undated) DEVICE — SLEEVE, VASO, THIGH, MED

## (undated) DEVICE — KIT ANESTHESIA W/CIRCUIT & 3/LT BAG W/FILTER (20EA/CA)

## (undated) DEVICE — TUBING DAY USE W/CARTRIDGE (1EA) ORDER MULTIPLES OF 10

## (undated) DEVICE — SUTURE 3-0 ETHILON FS-1 - (36/BX) 30 INCH

## (undated) DEVICE — NEEDLE SAFETY 18 GA X 1 1/2 IN (100EA/BX)

## (undated) DEVICE — SUTURE GENERAL

## (undated) DEVICE — SPONGE GAUZESTER 4 X 4 4PLY - (128PK/CA)

## (undated) DEVICE — SPIDER SHOULDER HOLDER (12EA/BX)

## (undated) DEVICE — SHAVER 5.5 RESECTOR FORMULA (5EA/BX )

## (undated) DEVICE — GLOVE BIOGEL SZ 7.5 SURGICAL PF LTX - (50PR/BX 4BX/CA)

## (undated) DEVICE — PAD EYE GAUZE COVERED OVAL 1 5/8 X 2 5/8" STERILE"

## (undated) DEVICE — SUCTION INSTRUMENT YANKAUER BULBOUS TIP W/O VENT (50EA/CA)

## (undated) DEVICE — SYRINGE DISP. 60 CC LL - (30/BX, 12BX/CA)**WHEN THESE ARE GONE ORDER #500206**

## (undated) DEVICE — GLOVE BIOGEL SZ 7 SURGICAL PF LTX - (50PR/BX 4BX/CA)

## (undated) DEVICE — CHLORAPREP 26 ML APPLICATOR - ORANGE TINT(25/CA)

## (undated) DEVICE — SENSOR SPO2 NEO LNCS ADHESIVE (20/BX) SEE USER NOTES

## (undated) DEVICE — SODIUM CHL IRRIGATION 0.9% 1000ML (12EA/CA)

## (undated) DEVICE — TUBING CLEARLINK DUO-VENT - C-FLO (48EA/CA)

## (undated) DEVICE — SYRINGE SAFETY 10 ML 18 GA X 1 1/2 BLUNT LL (100/BX 4BX/CA)

## (undated) DEVICE — KIT  I.V. START (100EA/CA)

## (undated) DEVICE — NEEDLE FILTER ASPIRATION 18 GA X 1 1/2 IN (100EA/BX)

## (undated) DEVICE — PACK SHOULDER ARTHROSCOPY SM - (2EA/CA)

## (undated) DEVICE — SUTURE NONABSORBABLE XBRAID #2 26MM (12EA/BX)

## (undated) DEVICE — CANISTER SUCTION 3000ML MECHANICAL FILTER AUTO SHUTOFF MEDI-VAC NONSTERILE LF DISP  (40EA/CA)

## (undated) DEVICE — GLOVE BIOGEL INDICATOR SZ 8 SURGICAL PF LTX - (50/BX 4BX/CA)

## (undated) DEVICE — DRAPETIBURON SHOULDER W/POUCH - (5EA/CA)

## (undated) DEVICE — DRAPE U SPLIT IMP 54 X 76 - (24/CA)

## (undated) DEVICE — CANNULA DIVIDED ADULT CO2 - SAMPLE W/FEMALE CONNCT (25/CA)

## (undated) DEVICE — GLOVE SZ 7.5 LF PROTEXIS (50PR/BX)

## (undated) DEVICE — CANNULA SUB-TENONS ANESTH. 1.1X25MM 19GAX1IN (10EA/SP)

## (undated) DEVICE — TOWEL STOP TIMEOUT SAFETY FLAG (40EA/CA)

## (undated) DEVICE — PROBE 23 GA ILLUM FLEX CURVED - LASER(6/BX)

## (undated) DEVICE — SUTURE EYE

## (undated) DEVICE — PROTECTOR ULNA NERVE - (36PR/CA)

## (undated) DEVICE — SET LEADWIRE 5 LEAD BEDSIDE DISPOSABLE ECG (1SET OF 5/EA)

## (undated) DEVICE — SENSOR OXIMETER ADULT SPO2 RD SET (20EA/BX)

## (undated) DEVICE — ELECTRODE 850 FOAM ADHESIVE - HYDROGEL RADIOTRNSPRNT (50/PK)

## (undated) DEVICE — SHAVER4.0 AGGRESSIVE + FORMLA (5EA/BX)

## (undated) DEVICE — CANISTER SUCTION RIGID RED 1500CC (40EA/CA)

## (undated) DEVICE — WATER IRRIGATION STERILE 1000ML (12EA/CA)

## (undated) DEVICE — LACTATED RINGERS INJ 1000 ML - (14EA/CA 60CA/PF)

## (undated) DEVICE — GLOVE BIOGEL INDICATOR SZ 7.5 SURGICAL PF LTX - (50PR/BX 4BX/CA)

## (undated) DEVICE — CANNULA INJECTION 27G (EYE) - 10/BX ALCON

## (undated) DEVICE — MASK ANESTHESIA ADULT  - (100/CA)

## (undated) DEVICE — TUBING CASSETTE CROSSFLOW INTEGRATED (1/EA) ORDER MULTIPLES OF 10

## (undated) DEVICE — PACK VITRECTOMY 23G 10K BEVELED (1EA/BX)

## (undated) DEVICE — SHIELD OPTH AL GRTR CVR FOX (50EA/BX)

## (undated) DEVICE — CATHETER IV 20 GA X 1-1/4 ---SURG.& SDS ONLY--- (50EA/BX)